# Patient Record
Sex: FEMALE | Race: AMERICAN INDIAN OR ALASKA NATIVE | URBAN - METROPOLITAN AREA
[De-identification: names, ages, dates, MRNs, and addresses within clinical notes are randomized per-mention and may not be internally consistent; named-entity substitution may affect disease eponyms.]

---

## 2018-09-12 ENCOUNTER — APPOINTMENT (RX ONLY)
Dept: URBAN - METROPOLITAN AREA CLINIC 128 | Facility: CLINIC | Age: 58
Setting detail: DERMATOLOGY
End: 2018-09-12

## 2018-09-12 DIAGNOSIS — L40.0 PSORIASIS VULGARIS: ICD-10-CM

## 2018-09-12 DIAGNOSIS — L72.8 OTHER FOLLICULAR CYSTS OF THE SKIN AND SUBCUTANEOUS TISSUE: ICD-10-CM

## 2018-09-12 PROBLEM — M12.9 ARTHROPATHY, UNSPECIFIED: Status: ACTIVE | Noted: 2018-09-12

## 2018-09-12 PROBLEM — L70.0 ACNE VULGARIS: Status: ACTIVE | Noted: 2018-09-12

## 2018-09-12 PROBLEM — L85.3 XEROSIS CUTIS: Status: ACTIVE | Noted: 2018-09-12

## 2018-09-12 PROCEDURE — ? PRESCRIPTION

## 2018-09-12 PROCEDURE — ? ADDITIONAL NOTES

## 2018-09-12 PROCEDURE — ? TREATMENT REGIMEN

## 2018-09-12 PROCEDURE — 99202 OFFICE O/P NEW SF 15 MIN: CPT

## 2018-09-12 PROCEDURE — ? COUNSELING

## 2018-09-12 RX ORDER — DOXYCYCLINE HYCLATE 100 MG/1
CAPSULE, GELATIN COATED ORAL
Qty: 20 | Refills: 0 | Status: ERX | COMMUNITY
Start: 2018-09-12

## 2018-09-12 RX ADMIN — DOXYCYCLINE HYCLATE: 100 CAPSULE, GELATIN COATED ORAL at 00:00

## 2018-09-12 ASSESSMENT — LOCATION ZONE DERM
LOCATION ZONE: LEG
LOCATION ZONE: TRUNK

## 2018-09-12 ASSESSMENT — LOCATION DETAILED DESCRIPTION DERM
LOCATION DETAILED: RIGHT INGUINAL FOLD
LOCATION DETAILED: GENITALIA

## 2018-09-12 ASSESSMENT — LOCATION SIMPLE DESCRIPTION DERM
LOCATION SIMPLE: RIGHT INGUINAL FOLD
LOCATION SIMPLE: GENITALIA

## 2018-09-12 NOTE — PROCEDURE: ADDITIONAL NOTES
Additional Notes: OTC treatmentfor elbows: CeraVe renewing SA Cream and/or Cetaphil Restoraderm
Detail Level: Simple

## 2019-02-18 ENCOUNTER — APPOINTMENT (RX ONLY)
Dept: URBAN - METROPOLITAN AREA CLINIC 128 | Facility: CLINIC | Age: 59
Setting detail: DERMATOLOGY
End: 2019-02-18

## 2019-02-18 DIAGNOSIS — L57.8 OTHER SKIN CHANGES DUE TO CHRONIC EXPOSURE TO NONIONIZING RADIATION: ICD-10-CM

## 2019-02-18 DIAGNOSIS — L30.4 ERYTHEMA INTERTRIGO: ICD-10-CM

## 2019-02-18 DIAGNOSIS — L91.8 OTHER HYPERTROPHIC DISORDERS OF THE SKIN: ICD-10-CM

## 2019-02-18 DIAGNOSIS — L82.1 OTHER SEBORRHEIC KERATOSIS: ICD-10-CM

## 2019-02-18 DIAGNOSIS — L70.0 ACNE VULGARIS: ICD-10-CM

## 2019-02-18 PROCEDURE — ? BENIGN DESTRUCTION COSMETIC

## 2019-02-18 PROCEDURE — 99213 OFFICE O/P EST LOW 20 MIN: CPT

## 2019-02-18 PROCEDURE — ? COUNSELING

## 2019-02-18 PROCEDURE — ? PRESCRIPTION

## 2019-02-18 RX ORDER — TRIAMCINOLONE ACETONIDE 1 MG/G
CREAM TOPICAL
Qty: 1 | Refills: 1 | Status: ERX | COMMUNITY
Start: 2019-02-18

## 2019-02-18 RX ORDER — SARECYCLINE HYDROCHLORIDE 100 MG/1
TABLET, COATED ORAL
Qty: 30 | Refills: 1 | Status: ERX | COMMUNITY
Start: 2019-02-18

## 2019-02-18 RX ADMIN — SARECYCLINE HYDROCHLORIDE: 100 TABLET, COATED ORAL at 00:00

## 2019-02-18 RX ADMIN — TRIAMCINOLONE ACETONIDE: 1 CREAM TOPICAL at 00:00

## 2019-02-18 ASSESSMENT — LOCATION ZONE DERM
LOCATION ZONE: ARM
LOCATION ZONE: FACE
LOCATION ZONE: NECK
LOCATION ZONE: TRUNK

## 2019-02-18 ASSESSMENT — LOCATION SIMPLE DESCRIPTION DERM
LOCATION SIMPLE: RIGHT LOWER BACK
LOCATION SIMPLE: LEFT CHEEK
LOCATION SIMPLE: LEFT ANTERIOR NECK
LOCATION SIMPLE: ABDOMEN
LOCATION SIMPLE: RIGHT CHEEK
LOCATION SIMPLE: LEFT FOREARM
LOCATION SIMPLE: LEFT BREAST

## 2019-02-18 ASSESSMENT — LOCATION DETAILED DESCRIPTION DERM
LOCATION DETAILED: RIGHT RIB CAGE
LOCATION DETAILED: RIGHT SUPERIOR LATERAL LOWER BACK
LOCATION DETAILED: RIGHT INFERIOR MEDIAL MALAR CHEEK
LOCATION DETAILED: SUBXIPHOID
LOCATION DETAILED: LEFT INFERIOR CENTRAL MALAR CHEEK
LOCATION DETAILED: LEFT PROXIMAL DORSAL FOREARM
LOCATION DETAILED: LEFT INFERIOR ANTERIOR NECK
LOCATION DETAILED: LEFT INFRAMAMMARY CREASE (INNER QUADRANT)

## 2019-02-18 NOTE — PROCEDURE: BENIGN DESTRUCTION COSMETIC
Anesthesia Volume In Cc: 0.5
Consent: The patient's consent was obtained including but not limited to risks of crusting, scabbing, blistering, scarring, darker or lighter pigmentary change, recurrence, incomplete removal and infection.
Post-Care Instructions: I reviewed with the patient in detail post-care instructions. Patient is to wear sunprotection, and avoid picking at any of the treated lesions. Pt may apply Vaseline to crusted or scabbing areas.
Anesthesia Type: 1% Xylocaine without epinephrine
Detail Level: Detailed
Price (Use Numbers Only, No Special Characters Or $): 50

## 2019-02-18 NOTE — PROCEDURE: COUNSELING
Topical Clindamycin Counseling: Patient counseled that this medication may cause skin irritation or allergic reactions.  In the event of skin irritation, the patient was advised to reduce the amount of the drug applied or use it less frequently.   The patient verbalized understanding of the proper use and possible adverse effects of clindamycin.  All of the patient's questions and concerns were addressed.
Isotretinoin Counseling: Patient should get monthly blood tests, not donate blood, not drive at night if vision affected, not share medication, and not undergo elective surgery for 6 months after tx completed. Side effects reviewed, pt to contact office should one occur.
Minocycline Counseling: Patient advised regarding possible photosensitivity and discoloration of the teeth, skin, lips, tongue and gums.  Patient instructed to avoid sunlight, if possible.  When exposed to sunlight, patients should wear protective clothing, sunglasses, and sunscreen.  The patient was instructed to call the office immediately if the following severe adverse effects occur:  hearing changes, easy bruising/bleeding, severe headache, or vision changes.  The patient verbalized understanding of the proper use and possible adverse effects of minocycline.  All of the patient's questions and concerns were addressed.
Topical Retinoid counseling:  Patient advised to apply a pea-sized amount only at bedtime and wait 30 minutes after washing their face before applying.  If too drying, patient may add a non-comedogenic moisturizer. The patient verbalized understanding of the proper use and possible adverse effects of retinoids.  All of the patient's questions and concerns were addressed.
Doxycycline Counseling:  Patient counseled regarding possible photosensitivity and increased risk for sunburn.  Patient instructed to avoid sunlight, if possible.  When exposed to sunlight, patients should wear protective clothing, sunglasses, and sunscreen.  The patient was instructed to call the office immediately if the following severe adverse effects occur:  hearing changes, easy bruising/bleeding, severe headache, or vision changes.  The patient verbalized understanding of the proper use and possible adverse effects of doxycycline.  All of the patient's questions and concerns were addressed.
Azithromycin Counseling:  I discussed with the patient the risks of azithromycin including but not limited to GI upset, allergic reaction, drug rash, diarrhea, and yeast infections.
Birth Control Pills Counseling: Birth Control Pill Counseling: I discussed with the patient the potential side effects of OCPs including but not limited to increased risk of stroke, heart attack, thrombophlebitis, deep venous thrombosis, hepatic adenomas, breast changes, GI upset, headaches, and depression.  The patient verbalized understanding of the proper use and possible adverse effects of OCPs. All of the patient's questions and concerns were addressed.
Tetracycline Counseling: Patient counseled regarding possible photosensitivity and increased risk for sunburn.  Patient instructed to avoid sunlight, if possible.  When exposed to sunlight, patients should wear protective clothing, sunglasses, and sunscreen.  The patient was instructed to call the office immediately if the following severe adverse effects occur:  hearing changes, easy bruising/bleeding, severe headache, or vision changes.  The patient verbalized understanding of the proper use and possible adverse effects of tetracycline.  All of the patient's questions and concerns were addressed. Patient understands to avoid pregnancy while on therapy due to potential birth defects.
Birth Control Pills Pregnancy And Lactation Text: This medication should be avoided if pregnant and for the first 30 days post-partum.
Tetracycline Pregnancy And Lactation Text: This medication is Pregnancy Category D and not consider safe during pregnancy. It is also excreted in breast milk.
Azithromycin Pregnancy And Lactation Text: This medication is considered safe during pregnancy and is also secreted in breast milk.
Use Enhanced Medication Counseling?: No
Doxycycline Pregnancy And Lactation Text: This medication is Pregnancy Category D and not consider safe during pregnancy. It is also excreted in breast milk but is considered safe for shorter treatment courses.
Topical Retinoid Pregnancy And Lactation Text: This medication is Pregnancy Category C. It is unknown if this medication is excreted in breast milk.
Isotretinoin Pregnancy And Lactation Text: This medication is Pregnancy Category X and is considered extremely dangerous during pregnancy. It is unknown if it is excreted in breast milk.
Topical Clindamycin Pregnancy And Lactation Text: This medication is Pregnancy Category B and is considered safe during pregnancy. It is unknown if it is excreted in breast milk.
Topical Sulfur Applications Counseling: Topical Sulfur Counseling: Patient counseled that this medication may cause skin irritation or allergic reactions.  In the event of skin irritation, the patient was advised to reduce the amount of the drug applied or use it less frequently.   The patient verbalized understanding of the proper use and possible adverse effects of topical sulfur application.  All of the patient's questions and concerns were addressed.
High Dose Vitamin A Counseling: Side effects reviewed, pt to contact office should one occur.
Erythromycin Counseling:  I discussed with the patient the risks of erythromycin including but not limited to GI upset, allergic reaction, drug rash, diarrhea, increase in liver enzymes, and yeast infections.
Dapsone Counseling: I discussed with the patient the risks of dapsone including but not limited to hemolytic anemia, agranulocytosis, rashes, methemoglobinemia, kidney failure, peripheral neuropathy, headaches, GI upset, and liver toxicity.  Patients who start dapsone require monitoring including baseline LFTs and weekly CBCs for the first month, then every month thereafter.  The patient verbalized understanding of the proper use and possible adverse effects of dapsone.  All of the patient's questions and concerns were addressed.
Benzoyl Peroxide Counseling: Patient counseled that medicine may cause skin irritation and bleach clothing.  In the event of skin irritation, the patient was advised to reduce the amount of the drug applied or use it less frequently.   The patient verbalized understanding of the proper use and possible adverse effects of benzoyl peroxide.  All of the patient's questions and concerns were addressed.
Tazorac Counseling:  Patient advised that medication is irritating and drying.  Patient may need to apply sparingly and wash off after an hour before eventually leaving it on overnight.  The patient verbalized understanding of the proper use and possible adverse effects of tazorac.  All of the patient's questions and concerns were addressed.
Bactrim Counseling:  I discussed with the patient the risks of sulfa antibiotics including but not limited to GI upset, allergic reaction, drug rash, diarrhea, dizziness, photosensitivity, and yeast infections.  Rarely, more serious reactions can occur including but not limited to aplastic anemia, agranulocytosis, methemoglobinemia, blood dyscrasias, liver or kidney failure, lung infiltrates or desquamative/blistering drug rashes.
Spironolactone Counseling: Patient advised regarding risks of diarrhea, abdominal pain, hyperkalemia, birth defects (for female patients), liver toxicity and renal toxicity. The patient may need blood work to monitor liver and kidney function and potassium levels while on therapy. The patient verbalized understanding of the proper use and possible adverse effects of spironolactone.  All of the patient's questions and concerns were addressed.
Detail Level: Zone
Spironolactone Pregnancy And Lactation Text: This medication can cause feminization of the male fetus and should be avoided during pregnancy. The active metabolite is also found in breast milk.
Bactrim Pregnancy And Lactation Text: This medication is Pregnancy Category D and is known to cause fetal risk.  It is also excreted in breast milk.
Dapsone Pregnancy And Lactation Text: This medication is Pregnancy Category C and is not considered safe during pregnancy or breast feeding.
Benzoyl Peroxide Pregnancy And Lactation Text: This medication is Pregnancy Category C. It is unknown if benzoyl peroxide is excreted in breast milk.
High Dose Vitamin A Pregnancy And Lactation Text: High dose vitamin A therapy is contraindicated during pregnancy and breast feeding.
Topical Sulfur Applications Pregnancy And Lactation Text: This medication is Pregnancy Category C and has an unknown safety profile during pregnancy. It is unknown if this topical medication is excreted in breast milk.
Tazorac Pregnancy And Lactation Text: This medication is not safe during pregnancy. It is unknown if this medication is excreted in breast milk.
Erythromycin Pregnancy And Lactation Text: This medication is Pregnancy Category B and is considered safe during pregnancy. It is also excreted in breast milk.
Detail Level: Detailed

## 2019-02-21 ENCOUNTER — RX ONLY (OUTPATIENT)
Age: 59
Setting detail: RX ONLY
End: 2019-02-21

## 2019-02-21 RX ORDER — SARECYCLINE HYDROCHLORIDE 100 MG/1
TABLET, COATED ORAL
Qty: 30 | Refills: 1 | Status: ERX

## 2019-03-12 ENCOUNTER — RX ONLY (OUTPATIENT)
Age: 59
Setting detail: RX ONLY
End: 2019-03-12

## 2019-03-12 RX ORDER — DOXYCYCLINE HYCLATE 100 MG/1
CAPSULE, GELATIN COATED ORAL
Qty: 90 | Refills: 1 | Status: ERX

## 2019-03-12 RX ORDER — DOXYCYCLINE HYCLATE 100 MG/1
CAPSULE, GELATIN COATED ORAL
Qty: 30 | Refills: 2 | Status: ERX

## 2019-07-17 ENCOUNTER — APPOINTMENT (RX ONLY)
Dept: URBAN - METROPOLITAN AREA CLINIC 129 | Facility: CLINIC | Age: 59
Setting detail: DERMATOLOGY
End: 2019-07-17

## 2019-07-17 DIAGNOSIS — L57.8 OTHER SKIN CHANGES DUE TO CHRONIC EXPOSURE TO NONIONIZING RADIATION: ICD-10-CM

## 2019-07-17 DIAGNOSIS — L70.0 ACNE VULGARIS: ICD-10-CM

## 2019-07-17 DIAGNOSIS — L40.0 PSORIASIS VULGARIS: ICD-10-CM

## 2019-07-17 PROCEDURE — ? COUNSELING

## 2019-07-17 PROCEDURE — ? INVENTORY

## 2019-07-17 PROCEDURE — 99213 OFFICE O/P EST LOW 20 MIN: CPT

## 2019-07-17 PROCEDURE — ? PRESCRIPTION

## 2019-07-17 PROCEDURE — ? TREATMENT REGIMEN

## 2019-07-17 PROCEDURE — ? ADDITIONAL NOTES

## 2019-07-17 RX ORDER — SODIUM SULFACETAMIDE, SULFUR 40; 90 MG/ML; MG/ML
LIQUID TOPICAL
Qty: 1 | Refills: 2 | Status: ERX | COMMUNITY
Start: 2019-07-17

## 2019-07-17 RX ADMIN — SODIUM SULFACETAMIDE, SULFUR: 40; 90 LIQUID TOPICAL at 14:32

## 2019-07-17 ASSESSMENT — LOCATION ZONE DERM
LOCATION ZONE: ARM
LOCATION ZONE: FACE
LOCATION ZONE: NECK
LOCATION ZONE: FACE

## 2019-07-17 ASSESSMENT — LOCATION SIMPLE DESCRIPTION DERM
LOCATION SIMPLE: LEFT CHEEK
LOCATION SIMPLE: LEFT ANTERIOR NECK
LOCATION SIMPLE: LEFT CHEEK
LOCATION SIMPLE: RIGHT CHEEK
LOCATION SIMPLE: LEFT FOREARM
LOCATION SIMPLE: RIGHT CHEEK

## 2019-07-17 ASSESSMENT — LOCATION DETAILED DESCRIPTION DERM
LOCATION DETAILED: LEFT INFERIOR CENTRAL MALAR CHEEK
LOCATION DETAILED: LEFT INFERIOR MEDIAL MALAR CHEEK
LOCATION DETAILED: LEFT INFERIOR ANTERIOR NECK
LOCATION DETAILED: RIGHT INFERIOR MEDIAL MALAR CHEEK
LOCATION DETAILED: LEFT PROXIMAL DORSAL FOREARM
LOCATION DETAILED: RIGHT INFERIOR CENTRAL MALAR CHEEK

## 2019-07-17 NOTE — PROCEDURE: COUNSELING
Minocycline Counseling: Patient advised regarding possible photosensitivity and discoloration of the teeth, skin, lips, tongue and gums.  Patient instructed to avoid sunlight, if possible.  When exposed to sunlight, patients should wear protective clothing, sunglasses, and sunscreen.  The patient was instructed to call the office immediately if the following severe adverse effects occur:  hearing changes, easy bruising/bleeding, severe headache, or vision changes.  The patient verbalized understanding of the proper use and possible adverse effects of minocycline.  All of the patient's questions and concerns were addressed.
Include Pregnancy/Lactation Warning?: No
Topical Clindamycin Counseling: Patient counseled that this medication may cause skin irritation or allergic reactions.  In the event of skin irritation, the patient was advised to reduce the amount of the drug applied or use it less frequently.   The patient verbalized understanding of the proper use and possible adverse effects of clindamycin.  All of the patient's questions and concerns were addressed.
Topical Clindamycin Pregnancy And Lactation Text: This medication is Pregnancy Category B and is considered safe during pregnancy. It is unknown if it is excreted in breast milk.
Bactrim Pregnancy And Lactation Text: This medication is Pregnancy Category D and is known to cause fetal risk.  It is also excreted in breast milk.
Doxycycline Pregnancy And Lactation Text: This medication is Pregnancy Category D and not consider safe during pregnancy. It is also excreted in breast milk but is considered safe for shorter treatment courses.
Tazorac Pregnancy And Lactation Text: This medication is not safe during pregnancy. It is unknown if this medication is excreted in breast milk.
Spironolactone Counseling: Patient advised regarding risks of diarrhea, abdominal pain, hyperkalemia, birth defects (for female patients), liver toxicity and renal toxicity. The patient may need blood work to monitor liver and kidney function and potassium levels while on therapy. The patient verbalized understanding of the proper use and possible adverse effects of spironolactone.  All of the patient's questions and concerns were addressed.
Birth Control Pills Pregnancy And Lactation Text: This medication should be avoided if pregnant and for the first 30 days post-partum.
High Dose Vitamin A Counseling: Side effects reviewed, pt to contact office should one occur.
Azithromycin Counseling:  I discussed with the patient the risks of azithromycin including but not limited to GI upset, allergic reaction, drug rash, diarrhea, and yeast infections.
Dapsone Pregnancy And Lactation Text: This medication is Pregnancy Category C and is not considered safe during pregnancy or breast feeding.
Birth Control Pills Counseling: Birth Control Pill Counseling: I discussed with the patient the potential side effects of OCPs including but not limited to increased risk of stroke, heart attack, thrombophlebitis, deep venous thrombosis, hepatic adenomas, breast changes, GI upset, headaches, and depression.  The patient verbalized understanding of the proper use and possible adverse effects of OCPs. All of the patient's questions and concerns were addressed.
Tetracycline Pregnancy And Lactation Text: This medication is Pregnancy Category D and not consider safe during pregnancy. It is also excreted in breast milk.
Erythromycin Counseling:  I discussed with the patient the risks of erythromycin including but not limited to GI upset, allergic reaction, drug rash, diarrhea, increase in liver enzymes, and yeast infections.
Topical Retinoid counseling:  Patient advised to apply a pea-sized amount only at bedtime and wait 30 minutes after washing their face before applying.  If too drying, patient may add a non-comedogenic moisturizer. The patient verbalized understanding of the proper use and possible adverse effects of retinoids.  All of the patient's questions and concerns were addressed.
High Dose Vitamin A Pregnancy And Lactation Text: High dose vitamin A therapy is contraindicated during pregnancy and breast feeding.
Tazorac Counseling:  Patient advised that medication is irritating and drying.  Patient may need to apply sparingly and wash off after an hour before eventually leaving it on overnight.  The patient verbalized understanding of the proper use and possible adverse effects of tazorac.  All of the patient's questions and concerns were addressed.
Detail Level: Zone
Erythromycin Pregnancy And Lactation Text: This medication is Pregnancy Category B and is considered safe during pregnancy. It is also excreted in breast milk.
Topical Sulfur Applications Pregnancy And Lactation Text: This medication is Pregnancy Category C and has an unknown safety profile during pregnancy. It is unknown if this topical medication is excreted in breast milk.
Benzoyl Peroxide Pregnancy And Lactation Text: This medication is Pregnancy Category C. It is unknown if benzoyl peroxide is excreted in breast milk.
Spironolactone Pregnancy And Lactation Text: This medication can cause feminization of the male fetus and should be avoided during pregnancy. The active metabolite is also found in breast milk.
Azithromycin Pregnancy And Lactation Text: This medication is considered safe during pregnancy and is also secreted in breast milk.
Dapsone Counseling: I discussed with the patient the risks of dapsone including but not limited to hemolytic anemia, agranulocytosis, rashes, methemoglobinemia, kidney failure, peripheral neuropathy, headaches, GI upset, and liver toxicity.  Patients who start dapsone require monitoring including baseline LFTs and weekly CBCs for the first month, then every month thereafter.  The patient verbalized understanding of the proper use and possible adverse effects of dapsone.  All of the patient's questions and concerns were addressed.
Bactrim Counseling:  I discussed with the patient the risks of sulfa antibiotics including but not limited to GI upset, allergic reaction, drug rash, diarrhea, dizziness, photosensitivity, and yeast infections.  Rarely, more serious reactions can occur including but not limited to aplastic anemia, agranulocytosis, methemoglobinemia, blood dyscrasias, liver or kidney failure, lung infiltrates or desquamative/blistering drug rashes.
Topical Retinoid Pregnancy And Lactation Text: This medication is Pregnancy Category C. It is unknown if this medication is excreted in breast milk.
Benzoyl Peroxide Counseling: Patient counseled that medicine may cause skin irritation and bleach clothing.  In the event of skin irritation, the patient was advised to reduce the amount of the drug applied or use it less frequently.   The patient verbalized understanding of the proper use and possible adverse effects of benzoyl peroxide.  All of the patient's questions and concerns were addressed.
Doxycycline Counseling:  Patient counseled regarding possible photosensitivity and increased risk for sunburn.  Patient instructed to avoid sunlight, if possible.  When exposed to sunlight, patients should wear protective clothing, sunglasses, and sunscreen.  The patient was instructed to call the office immediately if the following severe adverse effects occur:  hearing changes, easy bruising/bleeding, severe headache, or vision changes.  The patient verbalized understanding of the proper use and possible adverse effects of doxycycline.  All of the patient's questions and concerns were addressed.
Tetracycline Counseling: Patient counseled regarding possible photosensitivity and increased risk for sunburn.  Patient instructed to avoid sunlight, if possible.  When exposed to sunlight, patients should wear protective clothing, sunglasses, and sunscreen.  The patient was instructed to call the office immediately if the following severe adverse effects occur:  hearing changes, easy bruising/bleeding, severe headache, or vision changes.  The patient verbalized understanding of the proper use and possible adverse effects of tetracycline.  All of the patient's questions and concerns were addressed. Patient understands to avoid pregnancy while on therapy due to potential birth defects.
Topical Sulfur Applications Counseling: Topical Sulfur Counseling: Patient counseled that this medication may cause skin irritation or allergic reactions.  In the event of skin irritation, the patient was advised to reduce the amount of the drug applied or use it less frequently.   The patient verbalized understanding of the proper use and possible adverse effects of topical sulfur application.  All of the patient's questions and concerns were addressed.
Isotretinoin Pregnancy And Lactation Text: This medication is Pregnancy Category X and is considered extremely dangerous during pregnancy. It is unknown if it is excreted in breast milk.
Isotretinoin Counseling: Patient should get monthly blood tests, not donate blood, not drive at night if vision affected, not share medication, and not undergo elective surgery for 6 months after tx completed. Side effects reviewed, pt to contact office should one occur.
Detail Level: Detailed

## 2019-07-17 NOTE — PROCEDURE: TREATMENT REGIMEN
Detail Level: Zone
Initiate Treatment: Doxycycline 100 mg BID for 2 weeks, then qd, duobri apply to aa qhs
Action 2: Continue
Treatment 1: Otezla 30mg
Sig For Treatment 1 (If Needed): twice daily

## 2019-07-17 NOTE — PROCEDURE: ADDITIONAL NOTES
Detail Level: Simple
Additional Notes: OTC treatmentfor elbows: CeraVe renewing SA Cream and/or Cetaphil Restoraderm

## 2019-08-09 ENCOUNTER — RX ONLY (OUTPATIENT)
Age: 59
Setting detail: RX ONLY
End: 2019-08-09

## 2019-08-09 RX ORDER — SULFAMETHOXAZOLE AND TRIMETHOPRIM 800; 160 MG/1; MG/1
TABLET ORAL
Qty: 60 | Refills: 0 | Status: ERX | COMMUNITY
Start: 2019-08-09

## 2019-10-17 ENCOUNTER — APPOINTMENT (RX ONLY)
Dept: URBAN - METROPOLITAN AREA CLINIC 129 | Facility: CLINIC | Age: 59
Setting detail: DERMATOLOGY
End: 2019-10-17

## 2019-10-17 DIAGNOSIS — L20.89 OTHER ATOPIC DERMATITIS: ICD-10-CM

## 2019-10-17 DIAGNOSIS — L73.8 OTHER SPECIFIED FOLLICULAR DISORDERS: ICD-10-CM

## 2019-10-17 DIAGNOSIS — L40.0 PSORIASIS VULGARIS: ICD-10-CM

## 2019-10-17 PROCEDURE — ? ADDITIONAL NOTES

## 2019-10-17 PROCEDURE — 99213 OFFICE O/P EST LOW 20 MIN: CPT

## 2019-10-17 PROCEDURE — ? COUNSELING

## 2019-10-17 PROCEDURE — ? PRESCRIPTION

## 2019-10-17 PROCEDURE — ? TREATMENT REGIMEN

## 2019-10-17 RX ORDER — BETAMETHASONE DIPROPIONATE 0.5 MG/G
CREAM TOPICAL
Qty: 1 | Refills: 1 | Status: ERX | COMMUNITY
Start: 2019-10-17

## 2019-10-17 RX ADMIN — BETAMETHASONE DIPROPIONATE: 0.5 CREAM TOPICAL at 13:09

## 2019-10-17 ASSESSMENT — LOCATION DETAILED DESCRIPTION DERM
LOCATION DETAILED: RIGHT MEDIAL MALAR CHEEK
LOCATION DETAILED: LEFT INFERIOR LATERAL NECK
LOCATION DETAILED: RIGHT INFERIOR ANTERIOR NECK

## 2019-10-17 ASSESSMENT — LOCATION ZONE DERM
LOCATION ZONE: NECK
LOCATION ZONE: FACE

## 2019-10-17 ASSESSMENT — LOCATION SIMPLE DESCRIPTION DERM
LOCATION SIMPLE: RIGHT ANTERIOR NECK
LOCATION SIMPLE: LEFT ANTERIOR NECK
LOCATION SIMPLE: RIGHT CHEEK

## 2019-10-17 NOTE — PROCEDURE: TREATMENT REGIMEN
Action 4: Continue
Sig For Treatment 1 (If Needed): twice daily
Treatment 1: Otezla 30mg
Detail Level: Zone

## 2020-08-19 NOTE — PROGRESS NOTES
"Subjective:       Patient ID: Charla Patrick is a 60 y.o. female.    VIRTUAL TELENOTE    Start time: 3:30pm  Chief complaint: nasal congestion, postnasal drip  The patient location is: her home  The patient arrived at: 3:30pm  Present with the patient at the time of the telemed/virtual assessment:nobody  End time: 4:00pm  Total time spent with patient: 30 minutes    The attending portion of this evaluation, treatment, and documentation was performed per Jeanna Rascon PA-C via audio only.      Chief Complaint: Nasal Congestion    HPI     Patient is new to me and this clinic. She just moved to the area from [place]. Patient had technical difficulties with the video visit, so we opted for an audio only visit. It was admittedly difficult to hear the patient clearly, but I believe I had enough information to interpret her symptoms and initiate safe treatment based on her medical history. She notes sensation of "fluid in left ear" along with postnasal drip for the last 2 days. No rhinorrhea, coughing, SOB, CP, N/V/D.     Patient is a 60 year old female with a history of psoriatic arthritis, T2DM, and GERD.     Review of Systems   Constitutional: Negative for activity change, appetite change, chills, fatigue and fever.   HENT: Positive for nasal congestion and postnasal drip. Negative for rhinorrhea, sinus pressure/congestion and sore throat.    Respiratory: Negative for cough, choking, chest tightness, shortness of breath and wheezing.    Cardiovascular: Negative for chest pain and palpitations.   Gastrointestinal: Negative for abdominal pain, change in bowel habit, constipation, diarrhea, nausea, vomiting, reflux and change in bowel habit.   Genitourinary: Negative for difficulty urinating, dysuria, flank pain, frequency, hematuria and urgency.   Musculoskeletal: Negative for myalgias.   Integumentary:  Negative for rash.   Neurological: Negative for dizziness, vertigo, tremors, weakness, light-headedness and " headaches.   Psychiatric/Behavioral: Negative for sleep disturbance. The patient is not nervous/anxious.          Objective:      Physical Exam  Psychiatric:         Mood and Affect: Mood normal.         Behavior: Behavior normal.         Thought Content: Thought content normal.         Judgment: Judgment normal.         Physical exam was not able to be performed today, as this was an audio only virtual visit due to technical difficulties.       Assessment:       1. Allergic rhinitis, unspecified seasonality, unspecified trigger    2. Postnasal drip    3. Preventative health care    4. Type 2 diabetes mellitus without complication, without long-term current use of insulin    5. Psoriatic arthritis    6. Gastroesophageal reflux disease without esophagitis          Plan:       1. Allergic rhinitis, unspecified seasonality, unspecified trigger    2. Postnasal drip  - loratadine (CLARITIN) 10 mg tablet; Take 1 tablet (10 mg total) by mouth once daily.  Dispense: 15 tablet; Refill: 0  - fluticasone propionate (FLONASE) 50 mcg/actuation nasal spray; 2 sprays (100 mcg total) by Each Nostril route once daily. for 10 days  Dispense: 11.1 mL; Refill: 0    3. Preventative health care  - Mammo Digital Screening Bilat w/ [facility]obardo; Future    4. Type 2 diabetes mellitus without complication, without long-term current use of insulin  -on statin  -on glimepiride  -will get updated labs    5. Psoriatic arthritis  -on otezla, controlled    6. Gastroesophageal reflux disease without esophagitis  -controlled on pepcid and protonix         CARE GAPS:  Colonoscopy due in 1 year per patient.   All other labs and vaccines at future visit.     I do not believe that patient's current symptoms point to covid. Patient is new to the area and would like to establish care. She will come tomorrow for an in-person evaluation and to establish care with me and a physician/get labs ordered.

## 2020-08-20 ENCOUNTER — OFFICE VISIT (OUTPATIENT)
Dept: FAMILY MEDICINE | Facility: CLINIC | Age: 60
End: 2020-08-20
Payer: OTHER GOVERNMENT

## 2020-08-20 DIAGNOSIS — Z00.00 PREVENTATIVE HEALTH CARE: ICD-10-CM

## 2020-08-20 DIAGNOSIS — E11.9 TYPE 2 DIABETES MELLITUS WITHOUT COMPLICATION, WITHOUT LONG-TERM CURRENT USE OF INSULIN: ICD-10-CM

## 2020-08-20 DIAGNOSIS — L40.50 PSORIATIC ARTHRITIS: ICD-10-CM

## 2020-08-20 DIAGNOSIS — J30.9 ALLERGIC RHINITIS, UNSPECIFIED SEASONALITY, UNSPECIFIED TRIGGER: Primary | ICD-10-CM

## 2020-08-20 DIAGNOSIS — K21.9 GASTROESOPHAGEAL REFLUX DISEASE WITHOUT ESOPHAGITIS: ICD-10-CM

## 2020-08-20 DIAGNOSIS — R09.82 POSTNASAL DRIP: ICD-10-CM

## 2020-08-20 PROCEDURE — 99499 NO LOS: ICD-10-PCS | Mod: 95,,, | Performed by: PHYSICIAN ASSISTANT

## 2020-08-20 PROCEDURE — 99499 UNLISTED E&M SERVICE: CPT | Mod: 95,,, | Performed by: PHYSICIAN ASSISTANT

## 2020-08-20 RX ORDER — FLUTICASONE PROPIONATE 50 MCG
2 SPRAY, SUSPENSION (ML) NASAL DAILY
Qty: 11.1 ML | Refills: 0 | Status: SHIPPED | OUTPATIENT
Start: 2020-08-20 | End: 2020-08-21 | Stop reason: CLARIF

## 2020-08-20 RX ORDER — GLIMEPIRIDE 1 MG/1
1 TABLET ORAL
COMMUNITY
End: 2021-03-08 | Stop reason: SDUPTHER

## 2020-08-20 RX ORDER — PANTOPRAZOLE SODIUM 40 MG/1
40 TABLET, DELAYED RELEASE ORAL DAILY
COMMUNITY
End: 2021-02-09 | Stop reason: SDUPTHER

## 2020-08-20 RX ORDER — LORATADINE 10 MG/1
10 TABLET ORAL DAILY
Qty: 15 TABLET | Refills: 0 | Status: SHIPPED | OUTPATIENT
Start: 2020-08-20 | End: 2020-08-21 | Stop reason: CLARIF

## 2020-08-20 RX ORDER — TIZANIDINE 4 MG/1
4 TABLET ORAL EVERY 6 HOURS PRN
COMMUNITY
End: 2021-01-13

## 2020-08-20 RX ORDER — FAMOTIDINE 40 MG/1
40 TABLET, FILM COATED ORAL DAILY
COMMUNITY
End: 2023-04-19 | Stop reason: SDUPTHER

## 2020-08-20 RX ORDER — SIMVASTATIN 40 MG/1
40 TABLET, FILM COATED ORAL NIGHTLY
COMMUNITY
End: 2020-08-21 | Stop reason: SDUPTHER

## 2020-08-21 ENCOUNTER — OFFICE VISIT (OUTPATIENT)
Dept: FAMILY MEDICINE | Facility: CLINIC | Age: 60
End: 2020-08-21
Payer: OTHER GOVERNMENT

## 2020-08-21 ENCOUNTER — TELEPHONE (OUTPATIENT)
Dept: FAMILY MEDICINE | Facility: CLINIC | Age: 60
End: 2020-08-21

## 2020-08-21 VITALS
SYSTOLIC BLOOD PRESSURE: 132 MMHG | DIASTOLIC BLOOD PRESSURE: 80 MMHG | OXYGEN SATURATION: 97 % | HEART RATE: 66 BPM | WEIGHT: 169.56 LBS

## 2020-08-21 DIAGNOSIS — Z12.2 ENCOUNTER FOR SCREENING FOR MALIGNANT NEOPLASM OF RESPIRATORY ORGANS: ICD-10-CM

## 2020-08-21 DIAGNOSIS — R09.82 POSTNASAL DRIP: Primary | ICD-10-CM

## 2020-08-21 DIAGNOSIS — E78.5 HYPERLIPIDEMIA, UNSPECIFIED HYPERLIPIDEMIA TYPE: ICD-10-CM

## 2020-08-21 DIAGNOSIS — R09.82 ALLERGIC RHINITIS WITH POSTNASAL DRIP: Primary | ICD-10-CM

## 2020-08-21 DIAGNOSIS — J30.9 ALLERGIC RHINITIS WITH POSTNASAL DRIP: Primary | ICD-10-CM

## 2020-08-21 DIAGNOSIS — E11.9 TYPE 2 DIABETES MELLITUS WITHOUT COMPLICATION, WITHOUT LONG-TERM CURRENT USE OF INSULIN: ICD-10-CM

## 2020-08-21 DIAGNOSIS — Z00.00 PREVENTATIVE HEALTH CARE: ICD-10-CM

## 2020-08-21 DIAGNOSIS — F41.1 ANXIETY RELATED TO CHANGE IN ROLE: ICD-10-CM

## 2020-08-21 PROCEDURE — 99214 PR OFFICE/OUTPT VISIT, EST, LEVL IV, 30-39 MIN: ICD-10-PCS | Mod: S$PBB,,, | Performed by: PHYSICIAN ASSISTANT

## 2020-08-21 PROCEDURE — 99213 OFFICE O/P EST LOW 20 MIN: CPT | Mod: PBBFAC,PO | Performed by: PHYSICIAN ASSISTANT

## 2020-08-21 PROCEDURE — 99214 OFFICE O/P EST MOD 30 MIN: CPT | Mod: S$PBB,,, | Performed by: PHYSICIAN ASSISTANT

## 2020-08-21 PROCEDURE — 99999 PR PBB SHADOW E&M-EST. PATIENT-LVL III: ICD-10-PCS | Mod: PBBFAC,,, | Performed by: PHYSICIAN ASSISTANT

## 2020-08-21 PROCEDURE — 99999 PR PBB SHADOW E&M-EST. PATIENT-LVL III: CPT | Mod: PBBFAC,,, | Performed by: PHYSICIAN ASSISTANT

## 2020-08-21 RX ORDER — FLUTICASONE PROPIONATE 50 MCG
2 SPRAY, SUSPENSION (ML) NASAL DAILY
Qty: 11.1 ML | Refills: 0 | Status: SHIPPED | OUTPATIENT
Start: 2020-08-21 | End: 2020-08-31

## 2020-08-21 RX ORDER — SERTRALINE HYDROCHLORIDE 25 MG/1
25 TABLET, FILM COATED ORAL DAILY
Qty: 30 TABLET | Refills: 11 | Status: SHIPPED | OUTPATIENT
Start: 2020-08-21 | End: 2020-10-02

## 2020-08-21 RX ORDER — LORATADINE 10 MG/1
10 TABLET ORAL DAILY
Qty: 15 TABLET | Refills: 0 | Status: SHIPPED | OUTPATIENT
Start: 2020-08-21 | End: 2020-09-05

## 2020-08-21 RX ORDER — SIMVASTATIN 40 MG/1
40 TABLET, FILM COATED ORAL NIGHTLY
Qty: 90 TABLET | Refills: 6 | Status: SHIPPED | OUTPATIENT
Start: 2020-08-21 | End: 2021-01-13 | Stop reason: SDUPTHER

## 2020-08-21 NOTE — PROGRESS NOTES
Subjective:       Patient ID: Charla Patrick is a 60 y.o. female.    Chief Complaint: Establish Care    HPI     Patient is known to me. I did a virtual visit with her yesterday as a new patient. She has just moved to the area and would like to establish care with Dr. Vela. Virtual visit had plenty of technical difficulties, so it was converted into an audio visit. Patient was complaining of postnasal drip and feeling of fullness in left ear. I prescribed her flonase and claritin. She presents today for a physical exam and to get details of her history into her chart, as we had difficulties with audio during our visit.     In addition, patient states that anxiety is taking over her life. Worrying about multiple things each day for about the last month. Her mind is racing and she is feeling very tearful. No lack of energy. Not sluggish or tired. Appetite is decreased. No thoughts of harming self. She just moved to a new place and got a new job. She also is responsible for taking care of her elderly mother and she feels very overwhelmed. She has a history of a hysterectomy and has been noting pain with sex and vaginal dryness. She has no sexual desire.     Review of Systems   Constitutional: Negative for chills and fever.   HENT: Positive for postnasal drip. Negative for nasal congestion, ear discharge, ear pain, rhinorrhea, sinus pressure/congestion and sore throat.    Eyes: Negative for visual disturbance.   Respiratory: Negative for cough, chest tightness, shortness of breath and wheezing.    Cardiovascular: Negative for chest pain and palpitations.   Gastrointestinal: Positive for reflux (controlled on medication). Negative for abdominal pain, change in bowel habit, constipation, diarrhea, nausea, vomiting and change in bowel habit.   Genitourinary: Positive for dyspareunia, vaginal discharge (baseline clear vaginal discharge), vaginal pain and vaginal dryness. Negative for difficulty urinating, dysuria, flank  pain, frequency, hematuria, urgency and vaginal bleeding.   Musculoskeletal: Negative for arthralgias and myalgias.   Integumentary:  Negative for rash.   Neurological: Negative for dizziness, syncope, light-headedness and headaches.   Psychiatric/Behavioral: Negative for sleep disturbance. The patient is nervous/anxious.             Objective:      Physical Exam  Vitals signs reviewed.   Constitutional:       General: She is not in acute distress.     Appearance: Normal appearance. She is not ill-appearing, toxic-appearing or diaphoretic.   HENT:      Head: Normocephalic and atraumatic.      Right Ear: Tympanic membrane, ear canal and external ear normal. There is no impacted cerumen.      Left Ear: Tympanic membrane, ear canal and external ear normal. There is no impacted cerumen.      Nose: Congestion present. No rhinorrhea.      Mouth/Throat:      Mouth: Mucous membranes are moist.      Pharynx: No oropharyngeal exudate or posterior oropharyngeal erythema (mild posterior pharnygeal erythema ).   Eyes:      General: No scleral icterus.        Left eye: No discharge.      Extraocular Movements: Extraocular movements intact.      Conjunctiva/sclera: Conjunctivae normal.      Pupils: Pupils are equal, round, and reactive to light.   Neck:      Musculoskeletal: Normal range of motion and neck supple.   Cardiovascular:      Rate and Rhythm: Normal rate and regular rhythm.      Pulses: Normal pulses.      Heart sounds: Normal heart sounds. No murmur. No friction rub. No gallop.    Pulmonary:      Effort: Pulmonary effort is normal. No respiratory distress.      Breath sounds: Normal breath sounds. No stridor. No wheezing, rhonchi or rales.   Abdominal:      General: Abdomen is flat. Bowel sounds are normal. There is no distension.      Palpations: Abdomen is soft. There is no mass.      Tenderness: There is no abdominal tenderness. There is no guarding or rebound.   Musculoskeletal:         General: No deformity or  signs of injury.      Right lower leg: No edema.      Left lower leg: No edema.   Lymphadenopathy:      Cervical: No cervical adenopathy.   Skin:     General: Skin is warm.      Coloration: Skin is not jaundiced or pale.      Findings: No lesion or rash.   Neurological:      General: No focal deficit present.      Mental Status: She is alert and oriented to person, place, and time. Mental status is at baseline.   Psychiatric:         Attention and Perception: Attention normal.         Mood and Affect: Mood is anxious.         Speech: Speech normal.         Behavior: Behavior normal. Behavior is cooperative.         Thought Content: Thought content normal.         Cognition and Memory: Cognition and memory normal.         Judgment: Judgment normal.           Assessment:       1. Allergic rhinitis with postnasal drip    2. Type 2 diabetes mellitus without complication, without long-term current use of insulin    3. Preventative health care    4. Hyperlipidemia, unspecified hyperlipidemia type    5. Encounter for screening for malignant neoplasm of respiratory organs    6. Anxiety related to change in role            Plan:       1. Allergic rhinitis with postnasal drip  -prescribed claritin and flonase at virtual visit yesterday. Patient will  today.     2. Type 2 diabetes mellitus without complication, without long-term current use of insulin  - Hemoglobin A1C; Future  - Lipid Panel; Future  - simvastatin (ZOCOR) 40 MG tablet; Take 1 tablet (40 mg total) by mouth every evening.  Dispense: 90 tablet; Refill: 6  - MICROALBUMIN / CREATININE RATIO URINE; Future    3. Preventative health care  - TSH; Future    4. Hyperlipidemia, unspecified hyperlipidemia type  - simvastatin (ZOCOR) 40 MG tablet; Take 1 tablet (40 mg total) by mouth every evening.  Dispense: 90 tablet; Refill: 6    5. Encounter for screening for malignant neoplasm of respiratory organs  - CT Chest Lung Screening Low Dose; Future    6. Anxiety  related to change in role  - sertraline (ZOLOFT) 25 MG tablet; Take 1 tablet (25 mg total) by mouth once daily.  Dispense: 30 tablet; Refill: 11       CARE GAPS:  Got tetanus within last 10 years.   Plans to get shingles shot at personal pharmacy.   Colonoscopy due in 1 year per patient.   Eye exam will be scheduled per patient.    Will request patient records from multiple physicians. Patient recently got CBC and A1C at [facility] per patient.     F/U 1 month virtual visit to discuss efficacy of sertraline.       Took plenty of time to discuss the risks and benefits of SSRI therapy, including expected side effects. Answered patient's questions regarding medication options. Discussed the risk of increase in suicidal thoughts when starting an SSRI. The patient understands this risk and agrees to reach out to a healthcare provider or person patient trusts should ever experience suicidal thoughts. Also discussed importance of not stopping medication abruptly. Will let me know if experiences any adverse effects. Explained it may take 4-6 weeks for medication to have full effect. Gave patient plenty of resources regarding anxiety and depression education.

## 2020-08-21 NOTE — PATIENT INSTRUCTIONS
A few reminders from today:    Pickup your medications form [facility].   Zoloft will be sent in the mail. Let me know if you have any adverse effects.   1 month follow up, virtual visit.     Follow up with me if needed.   Please go to ER/urgent care if after hours or symptoms persist/worsen.       Do not hesitate to get in touch with me should you have any further questions.     Thank you for trusting me with your care!  I wish you health and happiness.    -Jeanna Rascon PA-C      Selective Serotonin Reuptake Inhibitors  Your healthcare provider prescribed a selective serotonin reuptake inhibitor (SSRI) for you. An SSRI is an antidepressant. SSRIs help reduce the extreme sadness, hopelessness, and lack of interest in life that are typical in people with depression. SSRIs are also used to treat panic disorder and obsessive compulsive disorder (OCD).     The name of my SSRI is ____________________________________________.   Guidelines for use  · Follow the fact sheet that came with your medicine. It tells you when and how to take your medicine. Ask for a sheet if you didnt get one.  · Before starting your medicine, tell your healthcare provider if you have:  ¨ Manic depression or bipolar disorder  ¨ Kidney disease  ¨ Thyroid disease  ¨ Diabetes  ¨ Liver disease  ¨ Seizure disorders  ¨ A history or current problem with drug abuse or dependence  · Tell your healthcare provider about any other medicines you are taking. This includes over-the-counter or herbal medicines.  · Take your medicine exactly as directed. This medicine takes several weeks to reach its full effect. Because of this, it is important to take this medicine every day, even if you believe that it is not helping your symptoms. You may need to take this medicine for a few months. Or you may need to take it for the rest of your life. It depends on your symptoms.  · If you miss a dose, take it as soon as you remember--unless its almost time for your next  dose. In that case, skip the dose you missed. Dont take a double dose.  · Take your medicine with food.  · Limit alcohol intake while taking this medicine. Or if possible, dont have any alcohol at all while taking this medicine.  · Dont take an SSRI if you are currently taking a monoamine oxidase inhibitor (MAO inhibitor).  · Dont stop taking your medicine without talking with your healthcare provider. If you wish to stop taking your SSRI, your healthcare provider will need to help you reduce the medicine slowly.  · Before using new over-the-counter medicines, check with the pharmacist to be sure it will not interact with the SSRI.  · Dont share your medicine or use another person's medicine, even if it is the same medicine and dose. Check with your provider if you have trouble affording your prescription.  Possible side effects  Tell your healthcare provider if you have any of these side effects. Dont stop taking the medicine until your healthcare provider tells you to. Mild side effects include:  · Anxiety  · Trouble sleeping  · Nausea  · Diarrhea  · Headaches  · Loss of sex drive or problems with orgasm  · Sweating     When to call your healthcare provider  Call your healthcare provider right away if you have any of the following:  · Increased feelings of depression  · Unusual joint or muscle pain  · Trouble breathing  · Shaking chills  · Excessive excitement  · Trouble controlling your emotions or actions  · Skin rash  · Hives  · Tremors   Date Last Reviewed: 5/1/2017 © 2000-2017 [employer]. [address] [place] [address]. All rights reserved. This information is not intended as a substitute for professional medical care. Always follow your healthcare professional's instructions.        Your Bodys Response to Anxiety    Normal anxiety is part of the bodys natural defense system. It's an alert to a threat that is unknown, vague, or comes from your own internal fears. While  youre in this state, your feelings can range from a vague sense of worry to physical sensations such as a pounding heartbeat. These feelings make you want to react to the threat. An anxiety response is normal in many situations. But when you have an anxiety disorder, the same response can occur at the wrong times.  Anxiety can be helpful  Normal anxiety is a signal from your brain that warns you of a threat and is a normal response to help you prevent something or decrease the bad effects of something you can't control. For example, anxiety is a normal response to situations that might damage your body, separate you from a loved one, or lose your job. The symptoms of anxiety can be physical and mental.  How does it feel?  At certain times, people with anxiety may have:  · Dizziness  · Muscle tension or pain  · Restlessness  · Sleeplessness  · Trouble concentrating  · Racing heartbeat  · Fast breathing  · Shaking or trembling  · Stomachache  · Diarrhea  · Loss of energy  · Sweating  · Cold, clammy hands  · Chest pain  · Dry mouth  Anxiety can also be a problem  Anxiety can become a problem when it is hard to control, occurs for months, and interferes with important parts of your life. With an anxiety disorder, your body has the response described above, but in inappropriate ways. The response a person has depends on the anxiety disorder he or she has. With some disorders, the anxiety is way out of proportion to the threat that triggers it. With others, anxiety may occur even when there isnt a clear threat or trigger.  Who does it affect?  Some people are more prone to persistent anxiety than others. It tends to run in families, and it affects more younger people than older people, and more women than men. But no age, race, or gender is immune to anxiety problems.  Anxiety can be treated  The good news is that the anxiety thats disrupting your life can be treated. Check with your healthcare provider and rule out  "any physical problems that may be causing the anxiety symptoms. If an anxiety disorder is diagnosed seek mental healthcare. This is an illness and it can respond to treatment. Most types of anxiety disorders will respond to "talk therapy" and medicines. Working with your doctor or other healthcare provider, you can develop skills to help you cope with anxiety. You can also gain the perspective you need to overcome your fears. Note: Good sources of support or guidance can be found at your local hospital, mental health clinic, or an employee assistance program.  How to cope with anxiety  If anxiety is wearing you down, here are some things you can do to cope:  · Keep in mind that you cant control everything about a situation. Change what you can and let the rest take its course.  · Exercise--its a great way to relieve tension and help your body feel relaxed.  · Avoid caffeine and nicotine, which can make anxiety symptoms worse.  · Fight the temptation to turn to alcohol or unprescribed drugs for relief. They only make things worse in the long run.  · Educate yourself about anxiety disorders. Keep track of helpful online resources and books you can use during stressful periods.  · Try stress management techniques such as meditation.  · Consider online or in-person support groups.   Date Last Reviewed: 1/1/2017  © 2076-7318 [employer]. [address] [place] [address], [place] [address]. All rights reserved. This information is not intended as a substitute for professional medical care. Always follow your healthcare professional's instructions.        "

## 2020-08-21 NOTE — TELEPHONE ENCOUNTER
----- Message from Shellie Zuluaga sent at 8/20/2020  4:26 PM CDT -----  Pt called stating that the prescriptions need to be in her  name of Celina so that the insurance will cover it please resend to pharmacy     Pt can be reached at [phone]

## 2020-08-25 ENCOUNTER — TELEPHONE (OUTPATIENT)
Dept: FAMILY MEDICINE | Facility: CLINIC | Age: 60
End: 2020-08-25

## 2020-08-25 NOTE — TELEPHONE ENCOUNTER
----- Message from Jack Delcid sent at 8/25/2020  8:13 AM CDT -----  Contact: Maya  Type: Needs Medical Advice  Who Called: Maya with [facility]  Symptoms (please be specific):    How long has patient had these symptoms:    Pharmacy name and phone #:    Best Call Back Number: [phone]  Additional Information: requesting order this morning for labs fax to ,patient at the facility now.

## 2020-08-25 NOTE — TELEPHONE ENCOUNTER
Called patient labs were changed to [facility]. If any problems please call us back. Patient states verbal understanding.

## 2020-08-26 ENCOUNTER — HOSPITAL ENCOUNTER (OUTPATIENT)
Dept: RADIOLOGY | Facility: HOSPITAL | Age: 60
Discharge: HOME | End: 2020-08-26
Attending: PHYSICIAN ASSISTANT
Payer: OTHER GOVERNMENT

## 2020-08-26 DIAGNOSIS — Z12.31 BREAST CANCER SCREENING BY MAMMOGRAM: ICD-10-CM

## 2020-08-26 DIAGNOSIS — Z00.00 PREVENTATIVE HEALTH CARE: ICD-10-CM

## 2020-08-26 LAB
ALBUMIN/CREAT UR: 5 MCG/MG CREAT
CHOLEST SERPL-MCNC: 144 MG/DL
CHOLEST/HDLC SERPL: 2.7 (CALC)
CREAT UR-MCNC: 197 MG/DL (ref 20–275)
HBA1C MFR BLD: 6 % OF TOTAL HGB
HDLC SERPL-MCNC: 53 MG/DL
LDLC SERPL CALC-MCNC: 68 MG/DL (CALC)
MICROALBUMIN UR-MCNC: 0.9 MG/DL
NONHDLC SERPL-MCNC: 91 MG/DL (CALC)
TRIGL SERPL-MCNC: 145 MG/DL
TSH SERPL-ACNC: 1.62 MIU/L (ref 0.4–4.5)

## 2020-08-26 PROCEDURE — 77067 MAMMO DIGITAL SCREENING BILAT WITH TOMOSYNTHESIS_CAD: ICD-10-PCS | Mod: 26,,, | Performed by: RADIOLOGY

## 2020-08-26 PROCEDURE — 77063 BREAST TOMOSYNTHESIS BI: CPT | Mod: 26,,, | Performed by: RADIOLOGY

## 2020-08-26 PROCEDURE — 77067 SCR MAMMO BI INCL CAD: CPT | Mod: TC,PO

## 2020-08-26 PROCEDURE — 77067 SCR MAMMO BI INCL CAD: CPT | Mod: 26,,, | Performed by: RADIOLOGY

## 2020-08-26 PROCEDURE — 77063 MAMMO DIGITAL SCREENING BILAT WITH TOMOSYNTHESIS_CAD: ICD-10-PCS | Mod: 26,,, | Performed by: RADIOLOGY

## 2020-08-27 ENCOUNTER — TELEPHONE (OUTPATIENT)
Dept: FAMILY MEDICINE | Facility: CLINIC | Age: 60
End: 2020-08-27

## 2020-08-27 NOTE — TELEPHONE ENCOUNTER
----- Message from Jenana Rascon PA-C sent at 8/23/2020  4:51 PM CDT -----  Please help patient schedule lab visit and LDCT chest

## 2020-09-10 ENCOUNTER — TELEPHONE (OUTPATIENT)
Dept: FAMILY MEDICINE | Facility: CLINIC | Age: 60
End: 2020-09-10

## 2020-09-10 NOTE — TELEPHONE ENCOUNTER
Note     ----- Message from Landy Santana sent at 9/10/2020  8:20 AM CDT -----  Type: Needs Medical Advice  Who Called:  pt  Best Call Back Number: [phone]  Additional Information:states that she believes she is having reaction to zoloft causing a rush feeling like being on a roller coaster and heart beats really fast in throat. States that she takes half of a pill now. Please give call back to discuss

## 2020-09-10 NOTE — TELEPHONE ENCOUNTER
Spoke with patient. Informed her to stop taking the medication. She started taking 1/2 of the medication 3 days ago to see if the symptoms would get better. She took the dosage for today already. Please advise.

## 2020-09-10 NOTE — TELEPHONE ENCOUNTER
----- Message from Landy Santana sent at 9/10/2020  8:20 AM CDT -----  Type: Needs Medical Advice  Who Called:  pt  Best Call Back Number: [phone]  Additional Information:states that she believes she is having reaction to zoloft causing a rush feeling like being on a roller coaster and heart beats really fast in throat. States that she takes half of a pill now. Please give call back to discuss

## 2020-09-14 ENCOUNTER — TELEPHONE (OUTPATIENT)
Dept: FAMILY MEDICINE | Facility: CLINIC | Age: 60
End: 2020-09-14

## 2020-09-14 NOTE — TELEPHONE ENCOUNTER
----- Message from Cheryl Barr sent at 9/14/2020  8:24 AM CDT -----  Contact: pt  Type:  Patient Returning Call    Who Called:   pt  Who Left Message for Patient:   LALO  Does the patient know what this is regarding?:  issues with zoloft  Best Call Back Number:  [phone]  Additional Information:  Please Advise ---Thank you

## 2020-09-17 ENCOUNTER — OFFICE VISIT (OUTPATIENT)
Dept: FAMILY MEDICINE | Facility: CLINIC | Age: 60
End: 2020-09-17
Payer: OTHER GOVERNMENT

## 2020-09-17 ENCOUNTER — TELEPHONE (OUTPATIENT)
Dept: RHEUMATOLOGY | Facility: CLINIC | Age: 60
End: 2020-09-17

## 2020-09-17 VITALS
DIASTOLIC BLOOD PRESSURE: 70 MMHG | HEIGHT: 65 IN | TEMPERATURE: 98 F | SYSTOLIC BLOOD PRESSURE: 110 MMHG | BODY MASS INDEX: 28.61 KG/M2 | OXYGEN SATURATION: 98 % | WEIGHT: 171.75 LBS | HEART RATE: 64 BPM

## 2020-09-17 DIAGNOSIS — J30.1 SEASONAL ALLERGIC RHINITIS DUE TO POLLEN: ICD-10-CM

## 2020-09-17 DIAGNOSIS — Z86.010 HISTORY OF COLON POLYPS: ICD-10-CM

## 2020-09-17 DIAGNOSIS — Z12.2 ENCOUNTER FOR SCREENING FOR MALIGNANT NEOPLASM OF RESPIRATORY ORGANS: ICD-10-CM

## 2020-09-17 DIAGNOSIS — E11.9 TYPE 2 DIABETES MELLITUS WITHOUT COMPLICATION, WITHOUT LONG-TERM CURRENT USE OF INSULIN: ICD-10-CM

## 2020-09-17 DIAGNOSIS — F17.200 TOBACCO DEPENDENCE: ICD-10-CM

## 2020-09-17 DIAGNOSIS — F41.1 ANXIETY RELATED TO CHANGE IN ROLE: ICD-10-CM

## 2020-09-17 DIAGNOSIS — L40.50 PSORIATIC ARTHRITIS: Primary | ICD-10-CM

## 2020-09-17 PROCEDURE — 99999 PR PBB SHADOW E&M-EST. PATIENT-LVL V: CPT | Mod: PBBFAC,,, | Performed by: INTERNAL MEDICINE

## 2020-09-17 PROCEDURE — 99214 OFFICE O/P EST MOD 30 MIN: CPT | Mod: S$PBB,,, | Performed by: INTERNAL MEDICINE

## 2020-09-17 PROCEDURE — 99999 PR PBB SHADOW E&M-EST. PATIENT-LVL V: ICD-10-PCS | Mod: PBBFAC,,, | Performed by: INTERNAL MEDICINE

## 2020-09-17 PROCEDURE — 99215 OFFICE O/P EST HI 40 MIN: CPT | Mod: PBBFAC,PO | Performed by: INTERNAL MEDICINE

## 2020-09-17 PROCEDURE — 99214 PR OFFICE/OUTPT VISIT, EST, LEVL IV, 30-39 MIN: ICD-10-PCS | Mod: S$PBB,,, | Performed by: INTERNAL MEDICINE

## 2020-09-17 RX ORDER — HYDROXYZINE HYDROCHLORIDE 10 MG/1
10 TABLET, FILM COATED ORAL 3 TIMES DAILY PRN
Qty: 30 TABLET | Refills: 0 | Status: SHIPPED | OUTPATIENT
Start: 2020-09-17 | End: 2020-10-02

## 2020-09-17 NOTE — PROGRESS NOTES
"  Patient ID: Charla Patrick     Chief Complaint:   Chief Complaint   Patient presents with    Discuss medications        HPI: New Patient to me but she has seen my PA for an initial office visit. At that time, she started Zoloft 25 mg and then cut it in 1/2 due to side effects of feeling a "roller coaster" in her chest going up to her neck. She has since weaned it off and symptoms are fading. She gives a good history of medication sensitivities, so I'll try Atarax 10 mg three times daily as needed anxiety. Past Medical History of hyperlipidemia and diabetes mellitus that are well Controlled. She will see Dr. ARIAN French for Psoriatic Arthritis in March- stable on Otezla.     Review of Systems   Constitutional: Negative.    HENT: Negative.    Eyes: Negative.    Respiratory: Negative.    Cardiovascular: Negative.    Gastrointestinal: Negative.    Endocrine: Negative.    Genitourinary: Negative.    Musculoskeletal: Negative.    Skin: Negative.    Allergic/Immunologic: Negative.    Neurological: Negative.    Hematological: Negative.    Psychiatric/Behavioral: The patient is nervous/anxious.           Objective:      Physical Exam   Physical Exam  Vitals signs and nursing note reviewed.   Constitutional:       Appearance: Normal appearance. She is well-developed.   HENT:      Head: Normocephalic and atraumatic.      Nose: Nose normal.   Eyes:      Extraocular Movements: Extraocular movements intact.      Conjunctiva/sclera: Conjunctivae normal.      Pupils: Pupils are equal, round, and reactive to light.   Neck:      Musculoskeletal: Normal range of motion and neck supple.   Cardiovascular:      Rate and Rhythm: Normal rate and regular rhythm.      Pulses: Normal pulses.      Heart sounds: Normal heart sounds.   Pulmonary:      Effort: Pulmonary effort is normal.      Breath sounds: Normal breath sounds.   Abdominal:      General: Bowel sounds are normal.      Palpations: Abdomen is soft.   Musculoskeletal: Normal " "range of motion.   Skin:     General: Skin is warm and dry.      Capillary Refill: Capillary refill takes less than 2 seconds.   Neurological:      General: No focal deficit present.      Mental Status: She is alert and oriented to person, place, and time.   Psychiatric:         Mood and Affect: Mood normal.         Behavior: Behavior normal.         Thought Content: Thought content normal.         Judgment: Judgment normal.            Vitals:   Vitals:    09/17/20 1542   BP: 110/70   Pulse: 64   Temp: 98.1 °F (36.7 °C)   TempSrc: Oral   SpO2: 98%   Weight: 77.9 kg (171 lb 11.8 oz)   Height: 5' 5" (1.651 m)          Current Outpatient Medications:     apremilast (OTEZLA) 30 mg Tab, Take 30 mg by mouth 2 (two) times daily., Disp: , Rfl:     famotidine (PEPCID) 40 MG tablet, Take 40 mg by mouth once daily., Disp: , Rfl:     glimepiride (AMARYL) 1 MG tablet, Take 1 mg by mouth before breakfast., Disp: , Rfl:     pantoprazole (PROTONIX) 40 MG tablet, Take 40 mg by mouth once daily., Disp: , Rfl:     simvastatin (ZOCOR) 40 MG tablet, Take 1 tablet (40 mg total) by mouth every evening., Disp: 90 tablet, Rfl: 6    tiZANidine (ZANAFLEX) 4 MG tablet, Take 4 mg by mouth every 6 (six) hours as needed., Disp: , Rfl:     hydrOXYzine HCL (ATARAX) 10 MG Tab, Take 1 tablet (10 mg total) by mouth 3 (three) times daily as needed (anxiety)., Disp: 30 tablet, Rfl: 0    loratadine (CLARITIN) 10 mg tablet, Take 1 tablet (10 mg total) by mouth once daily. for 15 days, Disp: 15 tablet, Rfl: 0    sertraline (ZOLOFT) 25 MG tablet, Take 1 tablet (25 mg total) by mouth once daily. (Patient not taking: Reported on 9/17/2020), Disp: 30 tablet, Rfl: 11   Assessment:       Patient Active Problem List    Diagnosis Date Noted    Diabetes mellitus, type 2     Allergic rhinitis     History of colon polyps     Hyperlipidemia 08/21/2020    Type 2 diabetes mellitus, without long-term current use of insulin 08/20/2020    Psoriatic arthritis " 08/20/2020    Gastroesophageal reflux disease without esophagitis 08/20/2020          Plan:       Charla Patrick  was seen today for follow-up and may need lab work.    Diagnoses and all orders for this visit:    Charla was seen today for discuss medications.    Diagnoses and all orders for this visit:    Psoriatic arthritis  -     Cancel: Ambulatory referral/consult to Rheumatology; Future  -     Ambulatory referral/consult to Rheumatology; Future    Seasonal allergic rhinitis due to pollen  Monitor     History of colon polyps  Colon in 2 years     Encounter for screening for malignant neoplasm of respiratory organs  LDLCT     Tobacco dependence    Anxiety related to change in role  -     hydrOXYzine HCL (ATARAX) 10 MG Tab; Take 1 tablet (10 mg total) by mouth 3 (three) times daily as needed (anxiety).    Type 2 diabetes mellitus without complication, without long-term current use of insulin  -     Diabetic Eye Screening Photo; Future  Controlled

## 2020-09-17 NOTE — PATIENT INSTRUCTIONS
Let me know when you get you labs done at [facility] because I would like to see them     Let me know how the new med is doing and schedule video visit if needed

## 2020-09-17 NOTE — TELEPHONE ENCOUNTER
----- Message from Etta Burris sent at 9/17/2020  9:15 AM CDT -----  Regarding: New Appointment  Type:  Sooner Apoointment Request    Caller is requesting a sooner appointment.  Caller declined first available appointment listed below.  Caller will not accept being placed on the waitlist and is requesting a message be sent to doctor.    Name of Caller:  Pt  When is the first available appointment?  2021  Symptoms:  arthritis   Best Call Back Number:  [phone]  Additional Information:  call after 3

## 2020-09-17 NOTE — TELEPHONE ENCOUNTER
Spoke with pt, pt has seen a rheumatologist in [place], got her scheduled for 3/01/21 at 11 am, pt took our fax number and stated she would have her records transferred to our office

## 2020-09-18 ENCOUNTER — TELEPHONE (OUTPATIENT)
Dept: ADMINISTRATIVE | Facility: HOSPITAL | Age: 60
End: 2020-09-18

## 2020-09-18 ENCOUNTER — PATIENT MESSAGE (OUTPATIENT)
Dept: ADMINISTRATIVE | Facility: HOSPITAL | Age: 60
End: 2020-09-18

## 2020-09-18 NOTE — LETTER
AUTHORIZATION FOR RELEASE OF   CONFIDENTIAL INFORMATION    Dear Kit Vickers,     We are seeing Charla Patrick, date of birth 1960, in the clinic at [facility]. Deyvi Vela MD is the patient's PCP. Charla Patrick has an outstanding lab/procedure at the time we reviewed her chart. In order to help keep her health information updated, she has authorized us to request the following medical record(s):                                       (X  )  COLONOSCOPY            Please fax records to [facility], John C Oubre, MD, [phone].     If you have any questions, please contact  Marie Sheffield, Care Coordinator  [facility]  Phone: [phone]  FAX: [phone]            Patient Name: Charla Patrick  : 1960  Patient Phone #: [phone]

## 2020-09-21 ENCOUNTER — HOSPITAL ENCOUNTER (OUTPATIENT)
Dept: RADIOLOGY | Facility: HOSPITAL | Age: 60
Discharge: HOME | End: 2020-09-21
Attending: PHYSICIAN ASSISTANT
Payer: OTHER GOVERNMENT

## 2020-09-21 ENCOUNTER — CLINICAL SUPPORT (OUTPATIENT)
Dept: FAMILY MEDICINE | Facility: CLINIC | Age: 60
End: 2020-09-21
Attending: INTERNAL MEDICINE
Payer: OTHER GOVERNMENT

## 2020-09-21 DIAGNOSIS — Z12.2 ENCOUNTER FOR SCREENING FOR MALIGNANT NEOPLASM OF RESPIRATORY ORGANS: ICD-10-CM

## 2020-09-21 DIAGNOSIS — E11.9 TYPE 2 DIABETES MELLITUS WITHOUT COMPLICATION, WITHOUT LONG-TERM CURRENT USE OF INSULIN: ICD-10-CM

## 2020-09-21 PROCEDURE — 99999 PR PBB SHADOW E&M-EST. PATIENT-LVL I: ICD-10-PCS | Mod: PBBFAC,,,

## 2020-09-21 PROCEDURE — G0297 CT CHEST LUNG SCREENING LOW DOSE: ICD-10-PCS | Mod: 26,,, | Performed by: RADIOLOGY

## 2020-09-21 PROCEDURE — G0297 LDCT FOR LUNG CA SCREEN: HCPCS | Mod: TC,PO

## 2020-09-21 PROCEDURE — G0297 LDCT FOR LUNG CA SCREEN: HCPCS | Mod: 26,,, | Performed by: RADIOLOGY

## 2020-09-21 PROCEDURE — 99211 OFF/OP EST MAY X REQ PHY/QHP: CPT | Mod: PBBFAC,25,PO

## 2020-09-21 PROCEDURE — 99999 PR PBB SHADOW E&M-EST. PATIENT-LVL I: CPT | Mod: PBBFAC,,,

## 2020-09-25 ENCOUNTER — PATIENT MESSAGE (OUTPATIENT)
Dept: FAMILY MEDICINE | Facility: CLINIC | Age: 60
End: 2020-09-25

## 2020-09-27 NOTE — TELEPHONE ENCOUNTER
I agree that hydroxyzine isn't a good choice due to those side effects. Let's do a video visit this week to discuss other meds.

## 2020-09-28 ENCOUNTER — PATIENT MESSAGE (OUTPATIENT)
Dept: FAMILY MEDICINE | Facility: CLINIC | Age: 60
End: 2020-09-28

## 2020-09-30 ENCOUNTER — PATIENT OUTREACH (OUTPATIENT)
Dept: ADMINISTRATIVE | Facility: HOSPITAL | Age: 60
End: 2020-09-30

## 2020-10-02 ENCOUNTER — OFFICE VISIT (OUTPATIENT)
Dept: FAMILY MEDICINE | Facility: CLINIC | Age: 60
End: 2020-10-02
Payer: OTHER GOVERNMENT

## 2020-10-02 DIAGNOSIS — F41.1 ANXIETY RELATED TO CHANGE IN ROLE: ICD-10-CM

## 2020-10-02 PROCEDURE — 99214 PR OFFICE/OUTPT VISIT, EST, LEVL IV, 30-39 MIN: ICD-10-PCS | Mod: 95,,, | Performed by: INTERNAL MEDICINE

## 2020-10-02 PROCEDURE — 99214 OFFICE O/P EST MOD 30 MIN: CPT | Mod: 95,,, | Performed by: INTERNAL MEDICINE

## 2020-10-02 RX ORDER — ALPRAZOLAM 0.5 MG/1
0.5 TABLET ORAL 2 TIMES DAILY PRN
Qty: 30 TABLET | Refills: 0 | Status: SHIPPED | OUTPATIENT
Start: 2020-10-02 | End: 2021-01-13

## 2020-10-02 NOTE — PROGRESS NOTES
"Patient ID: Charla Patrick     Chief Complaint: Follow up anxiety     The patient location is: [place]   The chief complaint leading to consultation is: Follow up anxiety    Visit type: audiovisual    Face to Face time with patient: 10 minutes   15 minutes of total time spent on the encounter, which includes face to face time and non-face to face time preparing to see the patient (eg, review of tests), Obtaining and/or reviewing separately obtained history, Documenting clinical information in the electronic or other health record, Independently interpreting results (not separately reported) and communicating results to the patient/family/caregiver, or Care coordination (not separately reported).         Each patient to whom he or she provides medical services by telemedicine is:  (1) informed of the relationship between the physician and patient and the respective role of any other health care provider with respect to management of the patient; and (2) notified that he or she may decline to receive medical services by telemedicine and may withdraw from such care at any time.    Notes:        HPI: Follow up anxiety after she stopped Zoloft due to a "rush like" feeling in her chest and lump in her throat. We tried Atarax, but her anxiety persisted and the lump in her thraot returned. Will try Xanax 0.5 mg twice daily (cut in 1/2 at first) and Monitor in 2 weeks. She saw an advertisement regarding Otezla and depression, but her symptoms seem more like anxiety to me.     Review of Systems   Constitutional: Negative.  Negative for activity change and unexpected weight change.   HENT: Negative.  Negative for hearing loss, rhinorrhea and trouble swallowing.    Eyes: Negative.  Negative for discharge and visual disturbance.   Respiratory: Negative.  Negative for chest tightness and wheezing.    Cardiovascular: Positive for palpitations. Negative for chest pain.   Gastrointestinal: Negative.  Negative for blood in stool, " constipation, diarrhea and vomiting.   Endocrine: Negative.  Negative for polydipsia and polyuria.   Genitourinary: Negative.  Negative for difficulty urinating, dysuria, hematuria and menstrual problem.   Musculoskeletal: Negative.  Negative for arthralgias, joint swelling and neck pain.   Skin: Negative.    Allergic/Immunologic: Negative.    Neurological: Negative.  Negative for weakness and headaches.   Hematological: Negative.    Psychiatric/Behavioral: Positive for dysphoric mood. Negative for confusion.          Objective:      Physical Exam   Physical Exam  Constitutional:       Appearance: Normal appearance.   HENT:      Head: Normocephalic and atraumatic.      Mouth/Throat:      Mouth: Mucous membranes are moist.   Eyes:      Extraocular Movements: Extraocular movements intact.      Conjunctiva/sclera: Conjunctivae normal.   Pulmonary:      Effort: Pulmonary effort is normal.   Musculoskeletal: Normal range of motion.   Neurological:      General: No focal deficit present.      Mental Status: She is alert and oriented to person, place, and time.   Psychiatric:         Mood and Affect: Mood normal.         Behavior: Behavior normal.         Thought Content: Thought content normal.         Judgment: Judgment normal.            Vitals: There were no vitals filed for this visit.       Current Outpatient Medications:     ALPRAZolam (XANAX) 0.5 MG tablet, Take 1 tablet (0.5 mg total) by mouth 2 (two) times daily as needed for Anxiety., Disp: 30 tablet, Rfl: 0    apremilast (OTEZLA) 30 mg Tab, Take 30 mg by mouth 2 (two) times daily., Disp: , Rfl:     famotidine (PEPCID) 40 MG tablet, Take 40 mg by mouth once daily., Disp: , Rfl:     glimepiride (AMARYL) 1 MG tablet, Take 1 mg by mouth before breakfast., Disp: , Rfl:     loratadine (CLARITIN) 10 mg tablet, Take 1 tablet (10 mg total) by mouth once daily. for 15 days, Disp: 15 tablet, Rfl: 0    pantoprazole (PROTONIX) 40 MG tablet, Take 40 mg by mouth once  daily., Disp: , Rfl:     simvastatin (ZOCOR) 40 MG tablet, Take 1 tablet (40 mg total) by mouth every evening., Disp: 90 tablet, Rfl: 6    tiZANidine (ZANAFLEX) 4 MG tablet, Take 4 mg by mouth every 6 (six) hours as needed., Disp: , Rfl:    Assessment:       Patient Active Problem List    Diagnosis Date Noted    Diabetes mellitus, type 2     Allergic rhinitis     History of colon polyps     Hyperlipidemia 08/21/2020    Type 2 diabetes mellitus, without long-term current use of insulin 08/20/2020    Psoriatic arthritis 08/20/2020    Gastroesophageal reflux disease without esophagitis 08/20/2020          Plan:       Charla Patrick  was seen today for follow-up and may need lab work.    Diagnoses and all orders for this visit:    Diagnoses and all orders for this visit:    Anxiety related to change in role  -     ALPRAZolam (XANAX) 0.5 MG tablet; Take 1 tablet (0.5 mg total) by mouth 2 (two) times daily as needed for Anxiety.

## 2020-10-13 ENCOUNTER — TELEPHONE (OUTPATIENT)
Dept: RHEUMATOLOGY | Facility: CLINIC | Age: 60
End: 2020-10-13

## 2020-10-13 ENCOUNTER — PATIENT MESSAGE (OUTPATIENT)
Dept: FAMILY MEDICINE | Facility: CLINIC | Age: 60
End: 2020-10-13

## 2020-10-13 NOTE — TELEPHONE ENCOUNTER
----- Message from Emily Platt sent at 10/13/2020 12:17 PM CDT -----  Regarding: reschedule  Contact: pt  Pt states she would like to reschedule appt to mid April    Pt can be reached at [phone]    LV that she is now booked 4-19-21 at 9 am.

## 2020-11-10 NOTE — PROGRESS NOTES
Assessment /Plan     For exam results, see Encounter Report.    Type 2 diabetes mellitus without complication, without long-term current use of insulin  -     Diabetic Eye Screening Photo

## 2020-11-12 ENCOUNTER — PATIENT MESSAGE (OUTPATIENT)
Dept: FAMILY MEDICINE | Facility: CLINIC | Age: 60
End: 2020-11-12

## 2020-11-14 ENCOUNTER — TELEPHONE (OUTPATIENT)
Dept: FAMILY MEDICINE | Facility: CLINIC | Age: 60
End: 2020-11-14

## 2020-11-15 NOTE — TELEPHONE ENCOUNTER
I think that was a problem with processing her diabetic eye exam photos.  She may need to be rescheduled.

## 2020-11-15 NOTE — TELEPHONE ENCOUNTER
----- Message from Tan Alcantar, OD sent at 11/10/2020  9:49 AM CST -----  No diabetic eyecam pics found in system, searched name and clinic number. Needs dilated eye exam

## 2020-11-18 ENCOUNTER — PATIENT MESSAGE (OUTPATIENT)
Dept: FAMILY MEDICINE | Facility: CLINIC | Age: 60
End: 2020-11-18

## 2020-12-03 ENCOUNTER — PATIENT MESSAGE (OUTPATIENT)
Dept: FAMILY MEDICINE | Facility: CLINIC | Age: 60
End: 2020-12-03

## 2020-12-03 DIAGNOSIS — E11.9 TYPE 2 DIABETES MELLITUS WITHOUT COMPLICATION, WITHOUT LONG-TERM CURRENT USE OF INSULIN: Primary | ICD-10-CM

## 2020-12-03 NOTE — TELEPHONE ENCOUNTER
Patient eye exam was not readable and you wanted her to see ophthalmology but there is no referral. Referral pended. Please advise

## 2020-12-07 ENCOUNTER — TELEPHONE (OUTPATIENT)
Dept: FAMILY MEDICINE | Facility: CLINIC | Age: 60
End: 2020-12-07

## 2020-12-07 NOTE — TELEPHONE ENCOUNTER
----- Message from Romy Pardo sent at 12/7/2020 12:12 PM CST -----  Contact: Patient [phone]  Patient is returning a phone call.  Who left a message for the patient: Renate  Does patient know what this is regarding: n/a    Please call and advise.    Thank You

## 2020-12-08 NOTE — TELEPHONE ENCOUNTER
----- Message from Chula Valdes sent at 12/7/2020  3:36 PM CST -----  Regarding: returning call  Contact: patient  Type:  Patient Returning Call    Who Called:  patient  Who Left Message for Patient:  conor  Does the patient know what this is regarding?:  n/a  Best Call Back Number:  [phone] (home)     Additional Information:  please call back

## 2021-01-13 ENCOUNTER — OFFICE VISIT (OUTPATIENT)
Dept: FAMILY MEDICINE | Facility: CLINIC | Age: 61
End: 2021-01-13
Payer: OTHER GOVERNMENT

## 2021-01-13 ENCOUNTER — LAB VISIT (OUTPATIENT)
Dept: LAB | Facility: HOSPITAL | Age: 61
End: 2021-01-13
Attending: PHYSICIAN ASSISTANT
Payer: OTHER GOVERNMENT

## 2021-01-13 VITALS
HEART RATE: 66 BPM | WEIGHT: 172.81 LBS | TEMPERATURE: 98 F | HEIGHT: 65 IN | OXYGEN SATURATION: 98 % | DIASTOLIC BLOOD PRESSURE: 70 MMHG | BODY MASS INDEX: 28.79 KG/M2 | SYSTOLIC BLOOD PRESSURE: 132 MMHG

## 2021-01-13 DIAGNOSIS — E11.9 TYPE 2 DIABETES MELLITUS WITHOUT COMPLICATION, WITHOUT LONG-TERM CURRENT USE OF INSULIN: ICD-10-CM

## 2021-01-13 DIAGNOSIS — R00.2 PALPITATIONS: Primary | ICD-10-CM

## 2021-01-13 DIAGNOSIS — R09.82 POSTNASAL DRIP: ICD-10-CM

## 2021-01-13 DIAGNOSIS — E78.5 HYPERLIPIDEMIA, UNSPECIFIED HYPERLIPIDEMIA TYPE: ICD-10-CM

## 2021-01-13 DIAGNOSIS — R00.2 PALPITATIONS: ICD-10-CM

## 2021-01-13 PROCEDURE — U0003 INFECTIOUS AGENT DETECTION BY NUCLEIC ACID (DNA OR RNA); SEVERE ACUTE RESPIRATORY SYNDROME CORONAVIRUS 2 (SARS-COV-2) (CORONAVIRUS DISEASE [COVID-19]), AMPLIFIED PROBE TECHNIQUE, MAKING USE OF HIGH THROUGHPUT TECHNOLOGIES AS DESCRIBED BY CMS-2020-01-R: HCPCS

## 2021-01-13 PROCEDURE — 80053 COMPREHEN METABOLIC PANEL: CPT

## 2021-01-13 PROCEDURE — 99214 PR OFFICE/OUTPT VISIT, EST, LEVL IV, 30-39 MIN: ICD-10-PCS | Mod: S$PBB,,, | Performed by: PHYSICIAN ASSISTANT

## 2021-01-13 PROCEDURE — 93010 EKG 12-LEAD: ICD-10-PCS | Mod: S$PBB,,, | Performed by: INTERNAL MEDICINE

## 2021-01-13 PROCEDURE — 93010 ELECTROCARDIOGRAM REPORT: CPT | Mod: S$PBB,,, | Performed by: INTERNAL MEDICINE

## 2021-01-13 PROCEDURE — 93005 ELECTROCARDIOGRAM TRACING: CPT | Mod: PBBFAC,PO | Performed by: INTERNAL MEDICINE

## 2021-01-13 PROCEDURE — 83036 HEMOGLOBIN GLYCOSYLATED A1C: CPT

## 2021-01-13 PROCEDURE — 99214 OFFICE O/P EST MOD 30 MIN: CPT | Mod: S$PBB,,, | Performed by: PHYSICIAN ASSISTANT

## 2021-01-13 PROCEDURE — 99999 PR PBB SHADOW E&M-EST. PATIENT-LVL IV: CPT | Mod: PBBFAC,,, | Performed by: PHYSICIAN ASSISTANT

## 2021-01-13 PROCEDURE — 99999 PR PBB SHADOW E&M-EST. PATIENT-LVL IV: ICD-10-PCS | Mod: PBBFAC,,, | Performed by: PHYSICIAN ASSISTANT

## 2021-01-13 PROCEDURE — 36415 COLL VENOUS BLD VENIPUNCTURE: CPT | Mod: PO

## 2021-01-13 PROCEDURE — 99214 OFFICE O/P EST MOD 30 MIN: CPT | Mod: PBBFAC,PO | Performed by: PHYSICIAN ASSISTANT

## 2021-01-13 RX ORDER — FLUTICASONE PROPIONATE 50 MCG
SPRAY, SUSPENSION (ML) NASAL
COMMUNITY
Start: 2020-08-20 | End: 2021-03-10

## 2021-01-13 RX ORDER — BETAMETHASONE DIPROPIONATE 0.5 MG/G
CREAM TOPICAL
COMMUNITY
Start: 2019-10-17 | End: 2021-03-10

## 2021-01-13 RX ORDER — SIMVASTATIN 40 MG/1
TABLET, FILM COATED ORAL
COMMUNITY
Start: 2020-02-28 | End: 2021-01-13 | Stop reason: ALTCHOICE

## 2021-01-13 RX ORDER — LORATADINE 10 MG/1
TABLET ORAL
COMMUNITY
Start: 2020-08-20 | End: 2021-01-13 | Stop reason: SDUPTHER

## 2021-01-13 RX ORDER — LORATADINE 10 MG/1
10 TABLET ORAL DAILY
Qty: 30 TABLET | Refills: 5 | Status: SHIPPED | OUTPATIENT
Start: 2021-01-13 | End: 2022-03-25

## 2021-01-13 RX ORDER — SIMVASTATIN 40 MG/1
40 TABLET, FILM COATED ORAL NIGHTLY
Qty: 90 TABLET | Refills: 6 | Status: SHIPPED | OUTPATIENT
Start: 2021-01-13 | End: 2022-02-15

## 2021-01-14 ENCOUNTER — PATIENT MESSAGE (OUTPATIENT)
Dept: FAMILY MEDICINE | Facility: CLINIC | Age: 61
End: 2021-01-14

## 2021-01-14 LAB
ALBUMIN SERPL BCP-MCNC: 4.3 G/DL (ref 3.5–5.2)
ALP SERPL-CCNC: 106 U/L (ref 55–135)
ALT SERPL W/O P-5'-P-CCNC: 19 U/L (ref 10–44)
ANION GAP SERPL CALC-SCNC: 9 MMOL/L (ref 8–16)
AST SERPL-CCNC: 17 U/L (ref 10–40)
BILIRUB SERPL-MCNC: 0.2 MG/DL (ref 0.1–1)
BUN SERPL-MCNC: 23 MG/DL (ref 8–23)
CALCIUM SERPL-MCNC: 9.8 MG/DL (ref 8.7–10.5)
CHLORIDE SERPL-SCNC: 103 MMOL/L (ref 95–110)
CO2 SERPL-SCNC: 28 MMOL/L (ref 23–29)
CREAT SERPL-MCNC: 0.9 MG/DL (ref 0.5–1.4)
EST. GFR  (AFRICAN AMERICAN): >60 ML/MIN/1.73 M^2
EST. GFR  (NON AFRICAN AMERICAN): >60 ML/MIN/1.73 M^2
ESTIMATED AVG GLUCOSE: 126 MG/DL (ref 68–131)
GLUCOSE SERPL-MCNC: 84 MG/DL (ref 70–110)
HBA1C MFR BLD HPLC: 6 % (ref 4–5.6)
POTASSIUM SERPL-SCNC: 4.2 MMOL/L (ref 3.5–5.1)
PROT SERPL-MCNC: 7.6 G/DL (ref 6–8.4)
SARS-COV-2 RNA RESP QL NAA+PROBE: NOT DETECTED
SODIUM SERPL-SCNC: 140 MMOL/L (ref 136–145)

## 2021-01-19 ENCOUNTER — PATIENT MESSAGE (OUTPATIENT)
Dept: FAMILY MEDICINE | Facility: CLINIC | Age: 61
End: 2021-01-19

## 2021-01-26 ENCOUNTER — CLINICAL SUPPORT (OUTPATIENT)
Dept: CARDIOLOGY | Facility: CLINIC | Age: 61
End: 2021-01-26
Attending: PHYSICIAN ASSISTANT
Payer: OTHER GOVERNMENT

## 2021-01-26 DIAGNOSIS — R00.2 PALPITATIONS: ICD-10-CM

## 2021-01-26 PROCEDURE — 93227 HOLTER MONITOR - 48 HOUR (CUPID ONLY): ICD-10-PCS | Mod: S$PBB,,, | Performed by: INTERNAL MEDICINE

## 2021-01-26 PROCEDURE — 93227 XTRNL ECG REC<48 HR R&I: CPT | Mod: S$PBB,,, | Performed by: INTERNAL MEDICINE

## 2021-01-26 PROCEDURE — 93226 XTRNL ECG REC<48 HR SCAN A/R: CPT | Mod: PBBFAC,PO | Performed by: INTERNAL MEDICINE

## 2021-01-28 ENCOUNTER — TELEPHONE (OUTPATIENT)
Dept: FAMILY MEDICINE | Facility: CLINIC | Age: 61
End: 2021-01-28

## 2021-01-28 DIAGNOSIS — R00.2 PALPITATIONS: Primary | ICD-10-CM

## 2021-01-28 LAB
OHS CV EVENT MONITOR DAY: 0
OHS CV HOLTER LENGTH DECIMAL HOURS: 48
OHS CV HOLTER LENGTH HOURS: 48
OHS CV HOLTER LENGTH MINUTES: 0

## 2021-02-05 ENCOUNTER — PATIENT OUTREACH (OUTPATIENT)
Dept: ADMINISTRATIVE | Facility: OTHER | Age: 61
End: 2021-02-05

## 2021-02-08 ENCOUNTER — PATIENT MESSAGE (OUTPATIENT)
Dept: FAMILY MEDICINE | Facility: CLINIC | Age: 61
End: 2021-02-08

## 2021-02-08 DIAGNOSIS — K21.9 GASTROESOPHAGEAL REFLUX DISEASE WITHOUT ESOPHAGITIS: Primary | ICD-10-CM

## 2021-02-09 RX ORDER — PANTOPRAZOLE SODIUM 40 MG/1
40 TABLET, DELAYED RELEASE ORAL DAILY
Qty: 30 TABLET | Refills: 4 | Status: SHIPPED | OUTPATIENT
Start: 2021-02-09 | End: 2021-04-30 | Stop reason: SDUPTHER

## 2021-02-10 ENCOUNTER — OFFICE VISIT (OUTPATIENT)
Dept: OPTOMETRY | Facility: CLINIC | Age: 61
End: 2021-02-10
Payer: OTHER GOVERNMENT

## 2021-02-10 DIAGNOSIS — H25.13 NUCLEAR SCLEROSIS OF BOTH EYES: ICD-10-CM

## 2021-02-10 DIAGNOSIS — H04.123 BILATERAL DRY EYES: ICD-10-CM

## 2021-02-10 DIAGNOSIS — E11.9 TYPE 2 DIABETES MELLITUS WITHOUT RETINOPATHY: Primary | ICD-10-CM

## 2021-02-10 PROCEDURE — 99213 OFFICE O/P EST LOW 20 MIN: CPT | Mod: PBBFAC,PO | Performed by: OPTOMETRIST

## 2021-02-10 PROCEDURE — 92004 PR EYE EXAM, NEW PATIENT,COMPREHESV: ICD-10-PCS | Mod: S$PBB,,, | Performed by: OPTOMETRIST

## 2021-02-10 PROCEDURE — 92004 COMPRE OPH EXAM NEW PT 1/>: CPT | Mod: S$PBB,,, | Performed by: OPTOMETRIST

## 2021-02-10 PROCEDURE — 99999 PR PBB SHADOW E&M-EST. PATIENT-LVL III: ICD-10-PCS | Mod: PBBFAC,,, | Performed by: OPTOMETRIST

## 2021-02-10 PROCEDURE — 99999 PR PBB SHADOW E&M-EST. PATIENT-LVL III: CPT | Mod: PBBFAC,,, | Performed by: OPTOMETRIST

## 2021-02-11 ENCOUNTER — OFFICE VISIT (OUTPATIENT)
Dept: CARDIOLOGY | Facility: CLINIC | Age: 61
End: 2021-02-11
Payer: OTHER GOVERNMENT

## 2021-02-11 VITALS
HEART RATE: 76 BPM | DIASTOLIC BLOOD PRESSURE: 83 MMHG | HEIGHT: 65 IN | WEIGHT: 173.5 LBS | SYSTOLIC BLOOD PRESSURE: 143 MMHG | BODY MASS INDEX: 28.91 KG/M2

## 2021-02-11 DIAGNOSIS — E78.5 HYPERLIPIDEMIA, UNSPECIFIED HYPERLIPIDEMIA TYPE: Primary | ICD-10-CM

## 2021-02-11 DIAGNOSIS — I49.3 PVC (PREMATURE VENTRICULAR CONTRACTION): ICD-10-CM

## 2021-02-11 DIAGNOSIS — R00.2 PALPITATIONS: ICD-10-CM

## 2021-02-11 DIAGNOSIS — E11.9 TYPE 2 DIABETES MELLITUS WITHOUT COMPLICATION, WITHOUT LONG-TERM CURRENT USE OF INSULIN: ICD-10-CM

## 2021-02-11 DIAGNOSIS — J30.1 SEASONAL ALLERGIC RHINITIS DUE TO POLLEN: ICD-10-CM

## 2021-02-11 PROCEDURE — 99999 PR PBB SHADOW E&M-EST. PATIENT-LVL III: ICD-10-PCS | Mod: PBBFAC,,, | Performed by: INTERNAL MEDICINE

## 2021-02-11 PROCEDURE — 99213 OFFICE O/P EST LOW 20 MIN: CPT | Mod: PBBFAC,PO | Performed by: INTERNAL MEDICINE

## 2021-02-11 PROCEDURE — 99203 PR OFFICE/OUTPT VISIT, NEW, LEVL III, 30-44 MIN: ICD-10-PCS | Mod: S$PBB,,, | Performed by: INTERNAL MEDICINE

## 2021-02-11 PROCEDURE — 99203 OFFICE O/P NEW LOW 30 MIN: CPT | Mod: S$PBB,,, | Performed by: INTERNAL MEDICINE

## 2021-02-11 PROCEDURE — 99999 PR PBB SHADOW E&M-EST. PATIENT-LVL III: CPT | Mod: PBBFAC,,, | Performed by: INTERNAL MEDICINE

## 2021-03-08 ENCOUNTER — PATIENT MESSAGE (OUTPATIENT)
Dept: RHEUMATOLOGY | Facility: CLINIC | Age: 61
End: 2021-03-08

## 2021-03-08 ENCOUNTER — PATIENT MESSAGE (OUTPATIENT)
Dept: FAMILY MEDICINE | Facility: CLINIC | Age: 61
End: 2021-03-08

## 2021-03-09 ENCOUNTER — PATIENT OUTREACH (OUTPATIENT)
Dept: ADMINISTRATIVE | Facility: OTHER | Age: 61
End: 2021-03-09

## 2021-03-09 RX ORDER — GLIMEPIRIDE 1 MG/1
1 TABLET ORAL
Qty: 90 TABLET | Refills: 3 | Status: SHIPPED | OUTPATIENT
Start: 2021-03-09 | End: 2022-04-29

## 2021-03-10 ENCOUNTER — OFFICE VISIT (OUTPATIENT)
Dept: DERMATOLOGY | Facility: CLINIC | Age: 61
End: 2021-03-10
Payer: OTHER GOVERNMENT

## 2021-03-10 DIAGNOSIS — L29.9 SCALP PRURITUS: ICD-10-CM

## 2021-03-10 DIAGNOSIS — L40.50 PSORIATIC ARTHRITIS: ICD-10-CM

## 2021-03-10 DIAGNOSIS — L73.9 FOLLICULITIS: ICD-10-CM

## 2021-03-10 DIAGNOSIS — L40.9 SCALP PSORIASIS: Primary | ICD-10-CM

## 2021-03-10 PROCEDURE — 99999 PR PBB SHADOW E&M-EST. PATIENT-LVL II: ICD-10-PCS | Mod: PBBFAC,,, | Performed by: DERMATOLOGY

## 2021-03-10 PROCEDURE — 99204 OFFICE O/P NEW MOD 45 MIN: CPT | Mod: S$PBB,,, | Performed by: DERMATOLOGY

## 2021-03-10 PROCEDURE — 99204 PR OFFICE/OUTPT VISIT, NEW, LEVL IV, 45-59 MIN: ICD-10-PCS | Mod: S$PBB,,, | Performed by: DERMATOLOGY

## 2021-03-10 PROCEDURE — 99999 PR PBB SHADOW E&M-EST. PATIENT-LVL II: CPT | Mod: PBBFAC,,, | Performed by: DERMATOLOGY

## 2021-03-10 PROCEDURE — 99212 OFFICE O/P EST SF 10 MIN: CPT | Mod: PBBFAC,PO | Performed by: DERMATOLOGY

## 2021-03-10 PROCEDURE — 87070 CULTURE OTHR SPECIMN AEROBIC: CPT | Performed by: DERMATOLOGY

## 2021-03-10 RX ORDER — KETOCONAZOLE 20 MG/ML
SHAMPOO, SUSPENSION TOPICAL
Qty: 120 ML | Refills: 5 | Status: SHIPPED | OUTPATIENT
Start: 2021-03-10 | End: 2022-03-25

## 2021-03-10 RX ORDER — DOXYCYCLINE HYCLATE 100 MG
TABLET ORAL
Qty: 40 TABLET | Refills: 1 | Status: SHIPPED | OUTPATIENT
Start: 2021-03-10 | End: 2021-03-31 | Stop reason: SDUPTHER

## 2021-03-10 RX ORDER — CLINDAMYCIN PHOSPHATE 11.9 MG/ML
SOLUTION TOPICAL
Qty: 60 ML | Refills: 3 | Status: SHIPPED | OUTPATIENT
Start: 2021-03-10 | End: 2022-03-25

## 2021-03-13 LAB — BACTERIA SPEC AEROBE CULT: NORMAL

## 2021-03-23 ENCOUNTER — PATIENT MESSAGE (OUTPATIENT)
Dept: RHEUMATOLOGY | Facility: CLINIC | Age: 61
End: 2021-03-23

## 2021-03-28 ENCOUNTER — PATIENT MESSAGE (OUTPATIENT)
Dept: FAMILY MEDICINE | Facility: CLINIC | Age: 61
End: 2021-03-28

## 2021-03-31 ENCOUNTER — PATIENT MESSAGE (OUTPATIENT)
Dept: DERMATOLOGY | Facility: CLINIC | Age: 61
End: 2021-03-31

## 2021-03-31 DIAGNOSIS — L73.9 FOLLICULITIS: ICD-10-CM

## 2021-03-31 RX ORDER — DOXYCYCLINE HYCLATE 100 MG
TABLET ORAL
Qty: 60 TABLET | Refills: 1 | Status: SHIPPED | OUTPATIENT
Start: 2021-03-31 | End: 2021-10-12

## 2021-04-19 ENCOUNTER — PATIENT OUTREACH (OUTPATIENT)
Dept: ADMINISTRATIVE | Facility: OTHER | Age: 61
End: 2021-04-19

## 2021-04-19 ENCOUNTER — OFFICE VISIT (OUTPATIENT)
Dept: RHEUMATOLOGY | Facility: CLINIC | Age: 61
End: 2021-04-19
Payer: OTHER GOVERNMENT

## 2021-04-19 VITALS
BODY MASS INDEX: 29.55 KG/M2 | WEIGHT: 177.38 LBS | SYSTOLIC BLOOD PRESSURE: 141 MMHG | HEIGHT: 65 IN | HEART RATE: 69 BPM | DIASTOLIC BLOOD PRESSURE: 83 MMHG

## 2021-04-19 DIAGNOSIS — L40.50 PSORIATIC ARTHRITIS: Primary | ICD-10-CM

## 2021-04-19 DIAGNOSIS — K21.9 GASTROESOPHAGEAL REFLUX DISEASE WITHOUT ESOPHAGITIS: ICD-10-CM

## 2021-04-19 DIAGNOSIS — Z79.899 HIGH RISK MEDICATIONS (NOT ANTICOAGULANTS) LONG-TERM USE: ICD-10-CM

## 2021-04-19 PROCEDURE — 99205 PR OFFICE/OUTPT VISIT, NEW, LEVL V, 60-74 MIN: ICD-10-PCS | Mod: S$PBB,,, | Performed by: INTERNAL MEDICINE

## 2021-04-19 PROCEDURE — 99999 PR PBB SHADOW E&M-EST. PATIENT-LVL III: CPT | Mod: PBBFAC,,, | Performed by: INTERNAL MEDICINE

## 2021-04-19 PROCEDURE — 99213 OFFICE O/P EST LOW 20 MIN: CPT | Mod: PBBFAC,PO | Performed by: INTERNAL MEDICINE

## 2021-04-19 PROCEDURE — 99999 PR PBB SHADOW E&M-EST. PATIENT-LVL III: ICD-10-PCS | Mod: PBBFAC,,, | Performed by: INTERNAL MEDICINE

## 2021-04-19 PROCEDURE — 99205 OFFICE O/P NEW HI 60 MIN: CPT | Mod: S$PBB,,, | Performed by: INTERNAL MEDICINE

## 2021-04-19 ASSESSMENT — ROUTINE ASSESSMENT OF PATIENT INDEX DATA (RAPID3)
MDHAQ FUNCTION SCORE: 0.6
PSYCHOLOGICAL DISTRESS SCORE: 4.4
TOTAL RAPID3 SCORE: 4.5
PATIENT GLOBAL ASSESSMENT SCORE: 5.5
PAIN SCORE: 6

## 2021-04-30 DIAGNOSIS — K21.9 GASTROESOPHAGEAL REFLUX DISEASE WITHOUT ESOPHAGITIS: ICD-10-CM

## 2021-05-03 RX ORDER — PANTOPRAZOLE SODIUM 40 MG/1
40 TABLET, DELAYED RELEASE ORAL DAILY
Qty: 90 TABLET | Refills: 3 | Status: SHIPPED | OUTPATIENT
Start: 2021-05-03 | End: 2022-04-29

## 2021-05-12 ENCOUNTER — PATIENT MESSAGE (OUTPATIENT)
Dept: FAMILY MEDICINE | Facility: CLINIC | Age: 61
End: 2021-05-12

## 2021-05-12 DIAGNOSIS — F41.1 ANXIETY RELATED TO CHANGE IN ROLE: ICD-10-CM

## 2021-05-13 RX ORDER — ALPRAZOLAM 0.5 MG/1
0.5 TABLET ORAL 2 TIMES DAILY PRN
Qty: 30 TABLET | Refills: 0 | Status: SHIPPED | OUTPATIENT
Start: 2021-05-13 | End: 2023-06-02

## 2021-06-04 ENCOUNTER — PATIENT MESSAGE (OUTPATIENT)
Dept: DERMATOLOGY | Facility: CLINIC | Age: 61
End: 2021-06-04

## 2021-06-07 DIAGNOSIS — L73.9 FOLLICULITIS: Primary | ICD-10-CM

## 2021-06-07 RX ORDER — SULFAMETHOXAZOLE AND TRIMETHOPRIM 800; 160 MG/1; MG/1
1 TABLET ORAL 2 TIMES DAILY
Qty: 20 TABLET | Refills: 0 | Status: SHIPPED | OUTPATIENT
Start: 2021-06-07 | End: 2021-06-17

## 2021-06-21 ENCOUNTER — TELEPHONE (OUTPATIENT)
Dept: PODIATRY | Facility: CLINIC | Age: 61
End: 2021-06-21

## 2021-06-28 ENCOUNTER — PATIENT MESSAGE (OUTPATIENT)
Dept: FAMILY MEDICINE | Facility: CLINIC | Age: 61
End: 2021-06-28

## 2021-06-28 DIAGNOSIS — K92.1 HEMATOCHEZIA: Primary | ICD-10-CM

## 2021-06-29 ENCOUNTER — PATIENT MESSAGE (OUTPATIENT)
Dept: FAMILY MEDICINE | Facility: CLINIC | Age: 61
End: 2021-06-29

## 2021-06-30 ENCOUNTER — PATIENT MESSAGE (OUTPATIENT)
Dept: FAMILY MEDICINE | Facility: CLINIC | Age: 61
End: 2021-06-30

## 2021-06-30 ENCOUNTER — TELEPHONE (OUTPATIENT)
Dept: GASTROENTEROLOGY | Facility: CLINIC | Age: 61
End: 2021-06-30

## 2021-07-14 ENCOUNTER — PATIENT OUTREACH (OUTPATIENT)
Dept: ADMINISTRATIVE | Facility: OTHER | Age: 61
End: 2021-07-14

## 2021-07-14 ENCOUNTER — TELEPHONE (OUTPATIENT)
Dept: FAMILY MEDICINE | Facility: CLINIC | Age: 61
End: 2021-07-14

## 2021-07-14 DIAGNOSIS — E11.9 TYPE 2 DIABETES MELLITUS WITHOUT COMPLICATION, WITHOUT LONG-TERM CURRENT USE OF INSULIN: Primary | ICD-10-CM

## 2021-07-15 ENCOUNTER — OFFICE VISIT (OUTPATIENT)
Dept: SURGERY | Facility: CLINIC | Age: 61
End: 2021-07-15
Payer: OTHER GOVERNMENT

## 2021-07-15 VITALS
BODY MASS INDEX: 29.46 KG/M2 | DIASTOLIC BLOOD PRESSURE: 68 MMHG | SYSTOLIC BLOOD PRESSURE: 126 MMHG | WEIGHT: 177 LBS | HEART RATE: 71 BPM

## 2021-07-15 DIAGNOSIS — Z86.010 HISTORY OF COLON POLYPS: Primary | ICD-10-CM

## 2021-07-15 PROCEDURE — 99203 PR OFFICE/OUTPT VISIT, NEW, LEVL III, 30-44 MIN: ICD-10-PCS | Mod: S$PBB,,, | Performed by: SURGERY

## 2021-07-15 PROCEDURE — 99999 PR PBB SHADOW E&M-EST. PATIENT-LVL III: CPT | Mod: PBBFAC,,, | Performed by: SURGERY

## 2021-07-15 PROCEDURE — 99203 OFFICE O/P NEW LOW 30 MIN: CPT | Mod: S$PBB,,, | Performed by: SURGERY

## 2021-07-15 PROCEDURE — 99999 PR PBB SHADOW E&M-EST. PATIENT-LVL III: ICD-10-PCS | Mod: PBBFAC,,, | Performed by: SURGERY

## 2021-07-15 PROCEDURE — 99213 OFFICE O/P EST LOW 20 MIN: CPT | Mod: PBBFAC,PO | Performed by: SURGERY

## 2021-07-16 ENCOUNTER — TELEPHONE (OUTPATIENT)
Dept: SURGERY | Facility: CLINIC | Age: 61
End: 2021-07-16

## 2021-07-20 ENCOUNTER — LAB VISIT (OUTPATIENT)
Dept: LAB | Facility: HOSPITAL | Age: 61
End: 2021-07-20
Attending: INTERNAL MEDICINE
Payer: OTHER GOVERNMENT

## 2021-07-20 DIAGNOSIS — Z79.899 HIGH RISK MEDICATIONS (NOT ANTICOAGULANTS) LONG-TERM USE: ICD-10-CM

## 2021-07-20 DIAGNOSIS — L40.50 PSORIATIC ARTHRITIS: ICD-10-CM

## 2021-07-20 DIAGNOSIS — E11.9 TYPE 2 DIABETES MELLITUS WITHOUT COMPLICATION, WITHOUT LONG-TERM CURRENT USE OF INSULIN: ICD-10-CM

## 2021-07-20 LAB
BASOPHILS # BLD AUTO: 0.05 K/UL (ref 0–0.2)
BASOPHILS NFR BLD: 0.7 % (ref 0–1.9)
DIFFERENTIAL METHOD: ABNORMAL
EOSINOPHIL # BLD AUTO: 0.1 K/UL (ref 0–0.5)
EOSINOPHIL NFR BLD: 1.6 % (ref 0–8)
ERYTHROCYTE [DISTWIDTH] IN BLOOD BY AUTOMATED COUNT: 13.5 % (ref 11.5–14.5)
ERYTHROCYTE [SEDIMENTATION RATE] IN BLOOD BY WESTERGREN METHOD: 10 MM/HR (ref 0–20)
ESTIMATED AVG GLUCOSE: 128 MG/DL (ref 68–131)
HBA1C MFR BLD: 6.1 % (ref 4–5.6)
HCT VFR BLD AUTO: 42 % (ref 37–48.5)
HGB BLD-MCNC: 13.3 G/DL (ref 12–16)
IMM GRANULOCYTES # BLD AUTO: 0.01 K/UL (ref 0–0.04)
IMM GRANULOCYTES NFR BLD AUTO: 0.1 % (ref 0–0.5)
LYMPHOCYTES # BLD AUTO: 2 K/UL (ref 1–4.8)
LYMPHOCYTES NFR BLD: 29.1 % (ref 18–48)
MCH RBC QN AUTO: 28.2 PG (ref 27–31)
MCHC RBC AUTO-ENTMCNC: 31.7 G/DL (ref 32–36)
MCV RBC AUTO: 89 FL (ref 82–98)
MONOCYTES # BLD AUTO: 0.7 K/UL (ref 0.3–1)
MONOCYTES NFR BLD: 10.3 % (ref 4–15)
NEUTROPHILS # BLD AUTO: 4.1 K/UL (ref 1.8–7.7)
NEUTROPHILS NFR BLD: 58.2 % (ref 38–73)
NRBC BLD-RTO: 0 /100 WBC
PLATELET # BLD AUTO: 243 K/UL (ref 150–450)
PMV BLD AUTO: 11.5 FL (ref 9.2–12.9)
RBC # BLD AUTO: 4.72 M/UL (ref 4–5.4)
WBC # BLD AUTO: 6.98 K/UL (ref 3.9–12.7)

## 2021-07-20 PROCEDURE — 36415 COLL VENOUS BLD VENIPUNCTURE: CPT | Mod: PO | Performed by: INTERNAL MEDICINE

## 2021-07-20 PROCEDURE — 86140 C-REACTIVE PROTEIN: CPT | Performed by: INTERNAL MEDICINE

## 2021-07-20 PROCEDURE — 80053 COMPREHEN METABOLIC PANEL: CPT | Performed by: INTERNAL MEDICINE

## 2021-07-20 PROCEDURE — 85025 COMPLETE CBC W/AUTO DIFF WBC: CPT | Performed by: INTERNAL MEDICINE

## 2021-07-20 PROCEDURE — 85651 RBC SED RATE NONAUTOMATED: CPT | Mod: PO | Performed by: INTERNAL MEDICINE

## 2021-07-20 PROCEDURE — 83036 HEMOGLOBIN GLYCOSYLATED A1C: CPT | Performed by: INTERNAL MEDICINE

## 2021-07-20 PROCEDURE — 80061 LIPID PANEL: CPT | Performed by: INTERNAL MEDICINE

## 2021-07-21 ENCOUNTER — PATIENT MESSAGE (OUTPATIENT)
Dept: SURGERY | Facility: CLINIC | Age: 61
End: 2021-07-21

## 2021-07-21 DIAGNOSIS — Z12.11 SCREEN FOR COLON CANCER: Primary | ICD-10-CM

## 2021-07-21 LAB
ALBUMIN SERPL BCP-MCNC: 4 G/DL (ref 3.5–5.2)
ALP SERPL-CCNC: 102 U/L (ref 55–135)
ALT SERPL W/O P-5'-P-CCNC: 22 U/L (ref 10–44)
ANION GAP SERPL CALC-SCNC: 9 MMOL/L (ref 8–16)
AST SERPL-CCNC: 19 U/L (ref 10–40)
BILIRUB SERPL-MCNC: 0.3 MG/DL (ref 0.1–1)
BUN SERPL-MCNC: 17 MG/DL (ref 8–23)
CALCIUM SERPL-MCNC: 10.3 MG/DL (ref 8.7–10.5)
CHLORIDE SERPL-SCNC: 105 MMOL/L (ref 95–110)
CHOLEST SERPL-MCNC: 154 MG/DL (ref 120–199)
CHOLEST/HDLC SERPL: 3 {RATIO} (ref 2–5)
CO2 SERPL-SCNC: 28 MMOL/L (ref 23–29)
CREAT SERPL-MCNC: 0.9 MG/DL (ref 0.5–1.4)
CRP SERPL-MCNC: 5.2 MG/L (ref 0–8.2)
EST. GFR  (AFRICAN AMERICAN): >60 ML/MIN/1.73 M^2
EST. GFR  (NON AFRICAN AMERICAN): >60 ML/MIN/1.73 M^2
GLUCOSE SERPL-MCNC: 104 MG/DL (ref 70–110)
HDLC SERPL-MCNC: 51 MG/DL (ref 40–75)
HDLC SERPL: 33.1 % (ref 20–50)
LDLC SERPL CALC-MCNC: 72 MG/DL (ref 63–159)
NONHDLC SERPL-MCNC: 103 MG/DL
POTASSIUM SERPL-SCNC: 3.9 MMOL/L (ref 3.5–5.1)
PROT SERPL-MCNC: 7.3 G/DL (ref 6–8.4)
SODIUM SERPL-SCNC: 142 MMOL/L (ref 136–145)
TRIGL SERPL-MCNC: 155 MG/DL (ref 30–150)

## 2021-07-21 RX ORDER — SODIUM CHLORIDE, SODIUM LACTATE, POTASSIUM CHLORIDE, CALCIUM CHLORIDE 600; 310; 30; 20 MG/100ML; MG/100ML; MG/100ML; MG/100ML
INJECTION, SOLUTION INTRAVENOUS CONTINUOUS
Status: CANCELLED | OUTPATIENT
Start: 2021-07-21

## 2021-07-21 RX ORDER — SODIUM CHLORIDE 0.9 % (FLUSH) 0.9 %
10 SYRINGE (ML) INJECTION
Status: CANCELLED | OUTPATIENT
Start: 2021-07-21

## 2021-07-25 ENCOUNTER — PATIENT MESSAGE (OUTPATIENT)
Dept: FAMILY MEDICINE | Facility: CLINIC | Age: 61
End: 2021-07-25

## 2021-07-26 ENCOUNTER — PATIENT MESSAGE (OUTPATIENT)
Dept: SURGERY | Facility: CLINIC | Age: 61
End: 2021-07-26

## 2021-08-04 ENCOUNTER — TELEPHONE (OUTPATIENT)
Dept: GASTROENTEROLOGY | Facility: CLINIC | Age: 61
End: 2021-08-04

## 2021-08-04 DIAGNOSIS — Z20.822 ENCOUNTER FOR LABORATORY TESTING FOR COVID-19 VIRUS: ICD-10-CM

## 2021-08-07 ENCOUNTER — LAB VISIT (OUTPATIENT)
Dept: FAMILY MEDICINE | Facility: CLINIC | Age: 61
End: 2021-08-07
Payer: OTHER GOVERNMENT

## 2021-08-07 DIAGNOSIS — Z20.822 ENCOUNTER FOR LABORATORY TESTING FOR COVID-19 VIRUS: ICD-10-CM

## 2021-08-07 PROCEDURE — U0003 INFECTIOUS AGENT DETECTION BY NUCLEIC ACID (DNA OR RNA); SEVERE ACUTE RESPIRATORY SYNDROME CORONAVIRUS 2 (SARS-COV-2) (CORONAVIRUS DISEASE [COVID-19]), AMPLIFIED PROBE TECHNIQUE, MAKING USE OF HIGH THROUGHPUT TECHNOLOGIES AS DESCRIBED BY CMS-2020-01-R: HCPCS | Performed by: SURGERY

## 2021-08-07 PROCEDURE — U0005 INFEC AGEN DETEC AMPLI PROBE: HCPCS | Performed by: SURGERY

## 2021-08-09 LAB
SARS-COV-2 RNA RESP QL NAA+PROBE: NOT DETECTED
SARS-COV-2- CYCLE NUMBER: -1

## 2021-08-10 ENCOUNTER — HOSPITAL ENCOUNTER (OUTPATIENT)
Facility: HOSPITAL | Age: 61
Discharge: HOME | End: 2021-08-10
Attending: SURGERY | Admitting: SURGERY
Payer: OTHER GOVERNMENT

## 2021-08-10 ENCOUNTER — ANESTHESIA EVENT (OUTPATIENT)
Dept: ENDOSCOPY | Facility: HOSPITAL | Age: 61
End: 2021-08-10
Payer: OTHER GOVERNMENT

## 2021-08-10 ENCOUNTER — ANESTHESIA (OUTPATIENT)
Dept: ENDOSCOPY | Facility: HOSPITAL | Age: 61
End: 2021-08-10
Payer: OTHER GOVERNMENT

## 2021-08-10 DIAGNOSIS — Z12.11 SCREEN FOR COLON CANCER: ICD-10-CM

## 2021-08-10 LAB — GLUCOSE SERPL-MCNC: 134 MG/DL (ref 70–110)

## 2021-08-10 PROCEDURE — D9220A PRA ANESTHESIA: ICD-10-PCS | Mod: 33,CRNA,, | Performed by: NURSE ANESTHETIST, CERTIFIED REGISTERED

## 2021-08-10 PROCEDURE — D9220A PRA ANESTHESIA: Mod: 33,ANES,, | Performed by: ANESTHESIOLOGY

## 2021-08-10 PROCEDURE — D9220A PRA ANESTHESIA: ICD-10-PCS | Mod: 33,ANES,, | Performed by: ANESTHESIOLOGY

## 2021-08-10 PROCEDURE — 45398 COLONOSCOPY W/BAND LIGATION: CPT | Mod: PO | Performed by: SURGERY

## 2021-08-10 PROCEDURE — 25000003 PHARM REV CODE 250: Mod: PO | Performed by: NURSE ANESTHETIST, CERTIFIED REGISTERED

## 2021-08-10 PROCEDURE — 45398 COLONOSCOPY W/BAND LIGATION: CPT | Mod: 33,,, | Performed by: SURGERY

## 2021-08-10 PROCEDURE — 27201022: Mod: PO | Performed by: SURGERY

## 2021-08-10 PROCEDURE — 37000008 HC ANESTHESIA 1ST 15 MINUTES: Mod: PO | Performed by: SURGERY

## 2021-08-10 PROCEDURE — 45398: ICD-10-PCS | Mod: 33,,, | Performed by: SURGERY

## 2021-08-10 PROCEDURE — 63600175 PHARM REV CODE 636 W HCPCS: Mod: PO | Performed by: SURGERY

## 2021-08-10 PROCEDURE — 00811 ANES LWR INTST NDSC NOS: CPT | Mod: PO | Performed by: SURGERY

## 2021-08-10 PROCEDURE — 63600175 PHARM REV CODE 636 W HCPCS: Mod: PO | Performed by: NURSE ANESTHETIST, CERTIFIED REGISTERED

## 2021-08-10 PROCEDURE — 37000009 HC ANESTHESIA EA ADD 15 MINS: Mod: PO | Performed by: SURGERY

## 2021-08-10 PROCEDURE — D9220A PRA ANESTHESIA: Mod: 33,CRNA,, | Performed by: NURSE ANESTHETIST, CERTIFIED REGISTERED

## 2021-08-10 RX ORDER — SODIUM CHLORIDE, SODIUM LACTATE, POTASSIUM CHLORIDE, CALCIUM CHLORIDE 600; 310; 30; 20 MG/100ML; MG/100ML; MG/100ML; MG/100ML
INJECTION, SOLUTION INTRAVENOUS CONTINUOUS
Status: DISCONTINUED | OUTPATIENT
Start: 2021-08-10 | End: 2021-08-10 | Stop reason: HOSPADM

## 2021-08-10 RX ORDER — SODIUM CHLORIDE 0.9 % (FLUSH) 0.9 %
10 SYRINGE (ML) INJECTION
Status: DISCONTINUED | OUTPATIENT
Start: 2021-08-10 | End: 2021-08-10 | Stop reason: HOSPADM

## 2021-08-10 RX ORDER — LIDOCAINE HCL/PF 100 MG/5ML
SYRINGE (ML) INTRAVENOUS
Status: DISCONTINUED | OUTPATIENT
Start: 2021-08-10 | End: 2021-08-10

## 2021-08-10 RX ORDER — PROPOFOL 10 MG/ML
VIAL (ML) INTRAVENOUS
Status: DISCONTINUED | OUTPATIENT
Start: 2021-08-10 | End: 2021-08-10

## 2021-08-10 RX ADMIN — PROPOFOL 50 MG: 10 INJECTION, EMULSION INTRAVENOUS at 07:08

## 2021-08-10 RX ADMIN — LIDOCAINE HYDROCHLORIDE 100 MG: 20 INJECTION, SOLUTION INTRAVENOUS at 07:08

## 2021-08-10 RX ADMIN — PROPOFOL 150 MG: 10 INJECTION, EMULSION INTRAVENOUS at 07:08

## 2021-08-10 RX ADMIN — SODIUM CHLORIDE, SODIUM LACTATE, POTASSIUM CHLORIDE, AND CALCIUM CHLORIDE: .6; .31; .03; .02 INJECTION, SOLUTION INTRAVENOUS at 06:08

## 2021-08-11 VITALS
TEMPERATURE: 98 F | RESPIRATION RATE: 18 BRPM | OXYGEN SATURATION: 98 % | SYSTOLIC BLOOD PRESSURE: 140 MMHG | HEIGHT: 65 IN | HEART RATE: 66 BPM | BODY MASS INDEX: 28.32 KG/M2 | WEIGHT: 170 LBS | DIASTOLIC BLOOD PRESSURE: 70 MMHG

## 2021-08-16 ENCOUNTER — PATIENT MESSAGE (OUTPATIENT)
Dept: RHEUMATOLOGY | Facility: CLINIC | Age: 61
End: 2021-08-16

## 2021-08-18 ENCOUNTER — PATIENT MESSAGE (OUTPATIENT)
Dept: RHEUMATOLOGY | Facility: CLINIC | Age: 61
End: 2021-08-18

## 2021-08-19 ENCOUNTER — OFFICE VISIT (OUTPATIENT)
Dept: RHEUMATOLOGY | Facility: CLINIC | Age: 61
End: 2021-08-19
Payer: OTHER GOVERNMENT

## 2021-08-19 ENCOUNTER — SPECIALTY PHARMACY (OUTPATIENT)
Dept: PHARMACY | Facility: CLINIC | Age: 61
End: 2021-08-19

## 2021-08-19 ENCOUNTER — LAB VISIT (OUTPATIENT)
Dept: LAB | Facility: HOSPITAL | Age: 61
End: 2021-08-19
Attending: INTERNAL MEDICINE
Payer: OTHER GOVERNMENT

## 2021-08-19 VITALS — HEIGHT: 65 IN | WEIGHT: 170 LBS | BODY MASS INDEX: 28.32 KG/M2

## 2021-08-19 DIAGNOSIS — L40.50 PSORIATIC ARTHRITIS: Primary | ICD-10-CM

## 2021-08-19 DIAGNOSIS — L40.50 PSORIATIC ARTHRITIS: ICD-10-CM

## 2021-08-19 DIAGNOSIS — E11.9 TYPE 2 DIABETES MELLITUS WITHOUT COMPLICATION, WITHOUT LONG-TERM CURRENT USE OF INSULIN: ICD-10-CM

## 2021-08-19 DIAGNOSIS — D84.9 IMMUNOSUPPRESSION: ICD-10-CM

## 2021-08-19 DIAGNOSIS — F32.A DEPRESSION, UNSPECIFIED DEPRESSION TYPE: ICD-10-CM

## 2021-08-19 DIAGNOSIS — R19.7 DIARRHEA, UNSPECIFIED TYPE: ICD-10-CM

## 2021-08-19 DIAGNOSIS — Z79.899 HIGH RISK MEDICATIONS (NOT ANTICOAGULANTS) LONG-TERM USE: ICD-10-CM

## 2021-08-19 PROCEDURE — 99214 OFFICE O/P EST MOD 30 MIN: CPT | Mod: 95,,, | Performed by: INTERNAL MEDICINE

## 2021-08-19 PROCEDURE — 86480 TB TEST CELL IMMUN MEASURE: CPT | Performed by: INTERNAL MEDICINE

## 2021-08-19 PROCEDURE — 36415 COLL VENOUS BLD VENIPUNCTURE: CPT | Mod: PO | Performed by: INTERNAL MEDICINE

## 2021-08-19 PROCEDURE — 99214 PR OFFICE/OUTPT VISIT, EST, LEVL IV, 30-39 MIN: ICD-10-PCS | Mod: 95,,, | Performed by: INTERNAL MEDICINE

## 2021-08-19 RX ORDER — SECUKINUMAB 150 MG/ML
150 INJECTION SUBCUTANEOUS
Qty: 1 SYRINGE | Refills: 11 | Status: SHIPPED | OUTPATIENT
Start: 2021-08-19 | End: 2021-10-13

## 2021-08-20 ENCOUNTER — IMMUNIZATION (OUTPATIENT)
Dept: FAMILY MEDICINE | Facility: CLINIC | Age: 61
End: 2021-08-20
Payer: OTHER GOVERNMENT

## 2021-08-20 DIAGNOSIS — Z23 NEED FOR VACCINATION: Primary | ICD-10-CM

## 2021-08-20 PROCEDURE — 0003A COVID-19, MRNA, LNP-S, PF, 30 MCG/0.3 ML DOSE VACCINE: CPT | Mod: CV19,,, | Performed by: FAMILY MEDICINE

## 2021-08-20 PROCEDURE — 91300 COVID-19, MRNA, LNP-S, PF, 30 MCG/0.3 ML DOSE VACCINE: ICD-10-PCS | Mod: ,,, | Performed by: FAMILY MEDICINE

## 2021-08-20 PROCEDURE — 91300 COVID-19, MRNA, LNP-S, PF, 30 MCG/0.3 ML DOSE VACCINE: CPT | Mod: ,,, | Performed by: FAMILY MEDICINE

## 2021-08-20 PROCEDURE — 0003A COVID-19, MRNA, LNP-S, PF, 30 MCG/0.3 ML DOSE VACCINE: ICD-10-PCS | Mod: CV19,,, | Performed by: FAMILY MEDICINE

## 2021-08-23 LAB — MAYO MISCELLANEOUS RESULT (REF): NORMAL

## 2021-08-24 ENCOUNTER — PATIENT MESSAGE (OUTPATIENT)
Dept: PHARMACY | Facility: CLINIC | Age: 61
End: 2021-08-24

## 2021-08-26 ENCOUNTER — TELEPHONE (OUTPATIENT)
Dept: RHEUMATOLOGY | Facility: CLINIC | Age: 61
End: 2021-08-26

## 2021-08-26 DIAGNOSIS — R53.83 MALAISE AND FATIGUE: ICD-10-CM

## 2021-08-26 DIAGNOSIS — D84.9 IMMUNOSUPPRESSION: Primary | ICD-10-CM

## 2021-08-26 DIAGNOSIS — R53.81 MALAISE AND FATIGUE: ICD-10-CM

## 2021-08-26 NOTE — TELEPHONE ENCOUNTER
----- Message from Supriya More PharmD sent at 8/19/2021  9:51 AM CDT -----  Regarding: TB in last 12 months  Good morning,    Patient's plan is requiring documentation of negative TB within the last 12 months in order to approve Cosentyx. Last TB on file 3/23/2020. Please schedule test accordingly if possible. Thank you for your time.    Supriya More, Hanna  Clinical Pharmacist - Rheum/Derm/GI [facility]  Ph: [phone]

## 2021-09-09 ENCOUNTER — LAB VISIT (OUTPATIENT)
Dept: PRIMARY CARE CLINIC | Facility: OTHER | Age: 61
End: 2021-09-09
Payer: OTHER GOVERNMENT

## 2021-09-09 DIAGNOSIS — Z20.822 ENCOUNTER FOR LABORATORY TESTING FOR COVID-19 VIRUS: ICD-10-CM

## 2021-09-09 PROCEDURE — U0003 INFECTIOUS AGENT DETECTION BY NUCLEIC ACID (DNA OR RNA); SEVERE ACUTE RESPIRATORY SYNDROME CORONAVIRUS 2 (SARS-COV-2) (CORONAVIRUS DISEASE [COVID-19]), AMPLIFIED PROBE TECHNIQUE, MAKING USE OF HIGH THROUGHPUT TECHNOLOGIES AS DESCRIBED BY CMS-2020-01-R: HCPCS | Performed by: FAMILY MEDICINE

## 2021-09-10 ENCOUNTER — PATIENT MESSAGE (OUTPATIENT)
Dept: FAMILY MEDICINE | Facility: CLINIC | Age: 61
End: 2021-09-10

## 2021-09-10 LAB
SARS-COV-2 RNA RESP QL NAA+PROBE: NOT DETECTED
SARS-COV-2- CYCLE NUMBER: NORMAL

## 2021-09-15 DIAGNOSIS — Z12.31 OTHER SCREENING MAMMOGRAM: ICD-10-CM

## 2021-10-07 ENCOUNTER — PATIENT MESSAGE (OUTPATIENT)
Dept: RHEUMATOLOGY | Facility: CLINIC | Age: 61
End: 2021-10-07

## 2021-10-08 ENCOUNTER — PATIENT MESSAGE (OUTPATIENT)
Dept: RHEUMATOLOGY | Facility: CLINIC | Age: 61
End: 2021-10-08

## 2021-10-12 ENCOUNTER — OFFICE VISIT (OUTPATIENT)
Dept: FAMILY MEDICINE | Facility: CLINIC | Age: 61
End: 2021-10-12
Payer: OTHER GOVERNMENT

## 2021-10-12 VITALS
HEART RATE: 73 BPM | HEIGHT: 65 IN | BODY MASS INDEX: 29.27 KG/M2 | OXYGEN SATURATION: 98 % | SYSTOLIC BLOOD PRESSURE: 130 MMHG | DIASTOLIC BLOOD PRESSURE: 78 MMHG | WEIGHT: 175.69 LBS

## 2021-10-12 DIAGNOSIS — J06.9 VIRAL URI WITH COUGH: Primary | ICD-10-CM

## 2021-10-12 DIAGNOSIS — Z23 NEED FOR VACCINATION: ICD-10-CM

## 2021-10-12 PROCEDURE — 99213 OFFICE O/P EST LOW 20 MIN: CPT | Mod: PBBFAC,PO,25 | Performed by: INTERNAL MEDICINE

## 2021-10-12 PROCEDURE — 99999 PR PBB SHADOW E&M-EST. PATIENT-LVL III: ICD-10-PCS | Mod: PBBFAC,,, | Performed by: INTERNAL MEDICINE

## 2021-10-12 PROCEDURE — 99213 PR OFFICE/OUTPT VISIT, EST, LEVL III, 20-29 MIN: ICD-10-PCS | Mod: S$PBB,,, | Performed by: INTERNAL MEDICINE

## 2021-10-12 PROCEDURE — 99999 PR PBB SHADOW E&M-EST. PATIENT-LVL III: CPT | Mod: PBBFAC,,, | Performed by: INTERNAL MEDICINE

## 2021-10-12 PROCEDURE — 99213 OFFICE O/P EST LOW 20 MIN: CPT | Mod: S$PBB,,, | Performed by: INTERNAL MEDICINE

## 2021-10-12 PROCEDURE — 90471 IMMUNIZATION ADMIN: CPT | Mod: PBBFAC,PO

## 2021-11-22 ENCOUNTER — PATIENT MESSAGE (OUTPATIENT)
Dept: DERMATOLOGY | Facility: CLINIC | Age: 61
End: 2021-11-22
Payer: OTHER GOVERNMENT

## 2021-12-06 ENCOUNTER — PATIENT MESSAGE (OUTPATIENT)
Dept: ADMINISTRATIVE | Facility: OTHER | Age: 61
End: 2021-12-06
Payer: OTHER GOVERNMENT

## 2021-12-06 ENCOUNTER — PATIENT OUTREACH (OUTPATIENT)
Dept: ADMINISTRATIVE | Facility: OTHER | Age: 61
End: 2021-12-06
Payer: OTHER GOVERNMENT

## 2021-12-08 ENCOUNTER — OFFICE VISIT (OUTPATIENT)
Dept: DERMATOLOGY | Facility: CLINIC | Age: 61
End: 2021-12-08
Payer: OTHER GOVERNMENT

## 2021-12-08 VITALS — RESPIRATION RATE: 18 BRPM | WEIGHT: 175.69 LBS | HEIGHT: 65 IN | BODY MASS INDEX: 29.27 KG/M2

## 2021-12-08 DIAGNOSIS — L73.9 FOLLICULITIS: Primary | ICD-10-CM

## 2021-12-08 DIAGNOSIS — L70.0 ACNE VULGARIS: ICD-10-CM

## 2021-12-08 PROCEDURE — 99214 PR OFFICE/OUTPT VISIT, EST, LEVL IV, 30-39 MIN: ICD-10-PCS | Mod: S$PBB,,, | Performed by: DERMATOLOGY

## 2021-12-08 PROCEDURE — 99213 OFFICE O/P EST LOW 20 MIN: CPT | Mod: PBBFAC,PO | Performed by: DERMATOLOGY

## 2021-12-08 PROCEDURE — 87070 CULTURE OTHR SPECIMN AEROBIC: CPT | Performed by: DERMATOLOGY

## 2021-12-08 PROCEDURE — 99999 PR PBB SHADOW E&M-EST. PATIENT-LVL III: ICD-10-PCS | Mod: PBBFAC,,, | Performed by: DERMATOLOGY

## 2021-12-08 PROCEDURE — 99999 PR PBB SHADOW E&M-EST. PATIENT-LVL III: CPT | Mod: PBBFAC,,, | Performed by: DERMATOLOGY

## 2021-12-08 PROCEDURE — 99214 OFFICE O/P EST MOD 30 MIN: CPT | Mod: S$PBB,,, | Performed by: DERMATOLOGY

## 2021-12-08 RX ORDER — SULFACETAMIDE SODIUM AND SULFUR 100; 20 MG/G; MG/G
CREAM TOPICAL
Qty: 30 G | Refills: 5 | Status: SHIPPED | OUTPATIENT
Start: 2021-12-08 | End: 2022-01-13

## 2021-12-08 RX ORDER — FLUCONAZOLE 200 MG/1
TABLET ORAL
Qty: 7 TABLET | Refills: 0 | Status: SHIPPED | OUTPATIENT
Start: 2021-12-08 | End: 2022-03-25

## 2021-12-08 RX ORDER — TRETINOIN 0.5 MG/G
CREAM TOPICAL
Qty: 45 G | Refills: 5 | Status: SHIPPED | OUTPATIENT
Start: 2021-12-08 | End: 2022-03-25

## 2021-12-09 ENCOUNTER — PATIENT MESSAGE (OUTPATIENT)
Dept: DERMATOLOGY | Facility: CLINIC | Age: 61
End: 2021-12-09
Payer: OTHER GOVERNMENT

## 2021-12-09 ENCOUNTER — TELEPHONE (OUTPATIENT)
Dept: DERMATOLOGY | Facility: CLINIC | Age: 61
End: 2021-12-09
Payer: OTHER GOVERNMENT

## 2021-12-11 ENCOUNTER — PATIENT MESSAGE (OUTPATIENT)
Dept: DERMATOLOGY | Facility: CLINIC | Age: 61
End: 2021-12-11
Payer: OTHER GOVERNMENT

## 2021-12-11 LAB — BACTERIA SPEC AEROBE CULT: NORMAL

## 2021-12-12 ENCOUNTER — PATIENT MESSAGE (OUTPATIENT)
Dept: DERMATOLOGY | Facility: CLINIC | Age: 61
End: 2021-12-12
Payer: OTHER GOVERNMENT

## 2021-12-13 ENCOUNTER — LAB VISIT (OUTPATIENT)
Dept: LAB | Facility: HOSPITAL | Age: 61
End: 2021-12-13
Attending: INTERNAL MEDICINE
Payer: OTHER GOVERNMENT

## 2021-12-13 DIAGNOSIS — R53.81 MALAISE AND FATIGUE: ICD-10-CM

## 2021-12-13 DIAGNOSIS — R53.83 MALAISE AND FATIGUE: ICD-10-CM

## 2021-12-13 DIAGNOSIS — D84.9 IMMUNOSUPPRESSION: ICD-10-CM

## 2021-12-13 DIAGNOSIS — L40.50 PSORIATIC ARTHRITIS: ICD-10-CM

## 2021-12-13 LAB
ALBUMIN SERPL BCP-MCNC: 4.1 G/DL (ref 3.5–5.2)
ALP SERPL-CCNC: 97 U/L (ref 55–135)
ALT SERPL W/O P-5'-P-CCNC: 21 U/L (ref 10–44)
ANION GAP SERPL CALC-SCNC: 9 MMOL/L (ref 8–16)
AST SERPL-CCNC: 19 U/L (ref 10–40)
BASOPHILS # BLD AUTO: 0.05 K/UL (ref 0–0.2)
BASOPHILS NFR BLD: 0.9 % (ref 0–1.9)
BILIRUB SERPL-MCNC: 0.3 MG/DL (ref 0.1–1)
BUN SERPL-MCNC: 14 MG/DL (ref 8–23)
CALCIUM SERPL-MCNC: 10 MG/DL (ref 8.7–10.5)
CHLORIDE SERPL-SCNC: 105 MMOL/L (ref 95–110)
CO2 SERPL-SCNC: 24 MMOL/L (ref 23–29)
CREAT SERPL-MCNC: 1.1 MG/DL (ref 0.5–1.4)
CRP SERPL-MCNC: 11 MG/L (ref 0–8.2)
DIFFERENTIAL METHOD: ABNORMAL
EOSINOPHIL # BLD AUTO: 0.1 K/UL (ref 0–0.5)
EOSINOPHIL NFR BLD: 2.1 % (ref 0–8)
ERYTHROCYTE [DISTWIDTH] IN BLOOD BY AUTOMATED COUNT: 13.2 % (ref 11.5–14.5)
ERYTHROCYTE [SEDIMENTATION RATE] IN BLOOD BY WESTERGREN METHOD: 18 MM/HR (ref 0–20)
EST. GFR  (AFRICAN AMERICAN): >60 ML/MIN/1.73 M^2
EST. GFR  (NON AFRICAN AMERICAN): 53.9 ML/MIN/1.73 M^2
GLUCOSE SERPL-MCNC: 118 MG/DL (ref 70–110)
HCT VFR BLD AUTO: 41.4 % (ref 37–48.5)
HGB BLD-MCNC: 13 G/DL (ref 12–16)
IMM GRANULOCYTES # BLD AUTO: 0 K/UL (ref 0–0.04)
IMM GRANULOCYTES NFR BLD AUTO: 0 % (ref 0–0.5)
LYMPHOCYTES # BLD AUTO: 1.7 K/UL (ref 1–4.8)
LYMPHOCYTES NFR BLD: 32.8 % (ref 18–48)
MCH RBC QN AUTO: 27.7 PG (ref 27–31)
MCHC RBC AUTO-ENTMCNC: 31.4 G/DL (ref 32–36)
MCV RBC AUTO: 88 FL (ref 82–98)
MONOCYTES # BLD AUTO: 0.5 K/UL (ref 0.3–1)
MONOCYTES NFR BLD: 9.3 % (ref 4–15)
NEUTROPHILS # BLD AUTO: 2.9 K/UL (ref 1.8–7.7)
NEUTROPHILS NFR BLD: 54.9 % (ref 38–73)
NRBC BLD-RTO: 0 /100 WBC
PLATELET # BLD AUTO: 227 K/UL (ref 150–450)
PMV BLD AUTO: 12.1 FL (ref 9.2–12.9)
POTASSIUM SERPL-SCNC: 4.4 MMOL/L (ref 3.5–5.1)
PROT SERPL-MCNC: 7.4 G/DL (ref 6–8.4)
RBC # BLD AUTO: 4.7 M/UL (ref 4–5.4)
SODIUM SERPL-SCNC: 138 MMOL/L (ref 136–145)
WBC # BLD AUTO: 5.28 K/UL (ref 3.9–12.7)

## 2021-12-13 PROCEDURE — 86140 C-REACTIVE PROTEIN: CPT | Performed by: INTERNAL MEDICINE

## 2021-12-13 PROCEDURE — 85025 COMPLETE CBC W/AUTO DIFF WBC: CPT | Performed by: INTERNAL MEDICINE

## 2021-12-13 PROCEDURE — 80053 COMPREHEN METABOLIC PANEL: CPT | Performed by: INTERNAL MEDICINE

## 2021-12-13 PROCEDURE — 85651 RBC SED RATE NONAUTOMATED: CPT | Mod: PO | Performed by: INTERNAL MEDICINE

## 2021-12-13 PROCEDURE — 36415 COLL VENOUS BLD VENIPUNCTURE: CPT | Mod: PO | Performed by: INTERNAL MEDICINE

## 2021-12-13 PROCEDURE — 80074 ACUTE HEPATITIS PANEL: CPT | Performed by: INTERNAL MEDICINE

## 2021-12-14 LAB
HAV IGM SERPL QL IA: NEGATIVE
HBV CORE IGM SERPL QL IA: NEGATIVE
HBV SURFACE AG SERPL QL IA: NEGATIVE
HCV AB SERPL QL IA: NEGATIVE

## 2021-12-21 ENCOUNTER — OFFICE VISIT (OUTPATIENT)
Dept: RHEUMATOLOGY | Facility: CLINIC | Age: 61
End: 2021-12-21
Payer: OTHER GOVERNMENT

## 2021-12-21 VITALS
DIASTOLIC BLOOD PRESSURE: 75 MMHG | HEART RATE: 64 BPM | HEIGHT: 65 IN | SYSTOLIC BLOOD PRESSURE: 130 MMHG | BODY MASS INDEX: 29.97 KG/M2 | WEIGHT: 179.88 LBS

## 2021-12-21 DIAGNOSIS — L40.9 PSORIASIS: ICD-10-CM

## 2021-12-21 DIAGNOSIS — L40.50 PSORIATIC ARTHRITIS: Primary | ICD-10-CM

## 2021-12-21 PROCEDURE — 99214 PR OFFICE/OUTPT VISIT, EST, LEVL IV, 30-39 MIN: ICD-10-PCS | Mod: S$PBB,,, | Performed by: INTERNAL MEDICINE

## 2021-12-21 PROCEDURE — 99999 PR PBB SHADOW E&M-EST. PATIENT-LVL IV: ICD-10-PCS | Mod: PBBFAC,,, | Performed by: INTERNAL MEDICINE

## 2021-12-21 PROCEDURE — 99214 OFFICE O/P EST MOD 30 MIN: CPT | Mod: PBBFAC,PN | Performed by: INTERNAL MEDICINE

## 2021-12-21 PROCEDURE — 99999 PR PBB SHADOW E&M-EST. PATIENT-LVL IV: CPT | Mod: PBBFAC,,, | Performed by: INTERNAL MEDICINE

## 2021-12-21 PROCEDURE — 99214 OFFICE O/P EST MOD 30 MIN: CPT | Mod: S$PBB,,, | Performed by: INTERNAL MEDICINE

## 2021-12-21 RX ORDER — PREDNISONE 10 MG/1
TABLET ORAL
Qty: 21 TABLET | Refills: 0 | Status: SHIPPED | OUTPATIENT
Start: 2021-12-21 | End: 2022-03-25

## 2021-12-21 ASSESSMENT — ROUTINE ASSESSMENT OF PATIENT INDEX DATA (RAPID3)
PSYCHOLOGICAL DISTRESS SCORE: 3.3
MDHAQ FUNCTION SCORE: 0.5
TOTAL RAPID3 SCORE: 3.56
PAIN SCORE: 5
PATIENT GLOBAL ASSESSMENT SCORE: 4
FATIGUE SCORE: 2.2

## 2022-01-03 ENCOUNTER — PATIENT MESSAGE (OUTPATIENT)
Dept: DERMATOLOGY | Facility: CLINIC | Age: 62
End: 2022-01-03
Payer: OTHER GOVERNMENT

## 2022-01-13 DIAGNOSIS — L70.0 ACNE VULGARIS: Primary | ICD-10-CM

## 2022-01-13 RX ORDER — SULFACETAMIDE SODIUM, SULFUR 100; 50 MG/G; MG/G
EMULSION TOPICAL
Qty: 170 G | Refills: 5 | Status: SHIPPED | OUTPATIENT
Start: 2022-01-13 | End: 2022-03-25

## 2022-02-09 ENCOUNTER — PATIENT MESSAGE (OUTPATIENT)
Dept: DERMATOLOGY | Facility: CLINIC | Age: 62
End: 2022-02-09
Payer: OTHER GOVERNMENT

## 2022-03-10 ENCOUNTER — PATIENT MESSAGE (OUTPATIENT)
Dept: RHEUMATOLOGY | Facility: CLINIC | Age: 62
End: 2022-03-10
Payer: OTHER GOVERNMENT

## 2022-03-24 ENCOUNTER — TELEPHONE (OUTPATIENT)
Dept: FAMILY MEDICINE | Facility: CLINIC | Age: 62
End: 2022-03-24
Payer: OTHER GOVERNMENT

## 2022-03-24 NOTE — TELEPHONE ENCOUNTER
Called pt regarding scheduling an apt, pt agreed and understood to upcoming apt.       ----- Message from Ingris Burton sent at 3/24/2022 11:31 AM CDT -----  Contact: Pt  Type: Needs Medical Advice  Who Called:  Pt  Symptoms (please be specific):  cough, runny nose, watery eyes  How long has patient had these symptoms:  3 days  Pharmacy name and phone #:    [id number] - [place] [id number] [place] [address] AT [facility] [address] [address]  Phone: [phone] Fax: [phone]    Best Call Back Number: [phone]  Additional Information:  Pt asking to please call something in today

## 2022-03-25 ENCOUNTER — OFFICE VISIT (OUTPATIENT)
Dept: FAMILY MEDICINE | Facility: CLINIC | Age: 62
End: 2022-03-25
Payer: OTHER GOVERNMENT

## 2022-03-25 VITALS
DIASTOLIC BLOOD PRESSURE: 72 MMHG | WEIGHT: 180.56 LBS | SYSTOLIC BLOOD PRESSURE: 126 MMHG | OXYGEN SATURATION: 98 % | TEMPERATURE: 98 F | HEIGHT: 65 IN | HEART RATE: 74 BPM | BODY MASS INDEX: 30.08 KG/M2

## 2022-03-25 DIAGNOSIS — R09.82 POSTNASAL DRIP: ICD-10-CM

## 2022-03-25 DIAGNOSIS — J30.1 SEASONAL ALLERGIC RHINITIS DUE TO POLLEN: Primary | ICD-10-CM

## 2022-03-25 PROCEDURE — 99999 PR PBB SHADOW E&M-EST. PATIENT-LVL III: CPT | Mod: PBBFAC,,, | Performed by: NURSE PRACTITIONER

## 2022-03-25 PROCEDURE — 99213 PR OFFICE/OUTPT VISIT, EST, LEVL III, 20-29 MIN: ICD-10-PCS | Mod: S$PBB,,, | Performed by: NURSE PRACTITIONER

## 2022-03-25 PROCEDURE — 99213 OFFICE O/P EST LOW 20 MIN: CPT | Mod: PBBFAC,PO | Performed by: NURSE PRACTITIONER

## 2022-03-25 PROCEDURE — 99999 PR PBB SHADOW E&M-EST. PATIENT-LVL III: ICD-10-PCS | Mod: PBBFAC,,, | Performed by: NURSE PRACTITIONER

## 2022-03-25 PROCEDURE — 99213 OFFICE O/P EST LOW 20 MIN: CPT | Mod: S$PBB,,, | Performed by: NURSE PRACTITIONER

## 2022-03-25 RX ORDER — ASCORBIC ACID 500 MG
500 TABLET ORAL DAILY
COMMUNITY

## 2022-03-25 RX ORDER — LORATADINE 10 MG/1
10 TABLET ORAL DAILY
Qty: 30 TABLET | Refills: 5 | Status: SHIPPED | OUTPATIENT
Start: 2022-03-25 | End: 2022-05-03

## 2022-03-25 NOTE — PROGRESS NOTES
Subjective:       Patient ID: Charla Patrick is a 61 y.o. female.    Chief Complaint: watery eyes (Eyes are watery, itchy, red, Lt ear painful ( pops when she blows her nose, and nose is runny. Used flonase and it helped. Started 3 days ago. Took at home covid test yesterday, it was Neg.)    HPI   Patient with history of seasonal allergies presents for itchy watery eyes, rhinorrhea, PND, nasal congestion and ear fullness. Started flonase at onset of symptoms which has helped. Not taking antihistamine currently. Denies discolored mucus production, fever, shortness of breath or wheezing. Home covid test negative yesterday     Vitals:    03/25/22 0947   BP: 126/72   Pulse: 74   Temp: 97.9 °F (36.6 °C)     Review of Systems   Constitutional: Negative for chills, diaphoresis and fever.   HENT: Positive for congestion, postnasal drip and rhinorrhea.    Eyes: Positive for discharge and itching.   Respiratory: Positive for cough. Negative for shortness of breath and wheezing.    Cardiovascular: Negative for chest pain.       Past Medical History:   Diagnosis Date    Allergic rhinitis     Diabetes mellitus, type 2     History of colon polyps     Hyperlipidemia     Psoriatic arthritis      Objective:      Physical Exam  Constitutional:       General: She is not in acute distress.     Appearance: She is well-developed. She is not ill-appearing, toxic-appearing or diaphoretic.   HENT:      Right Ear: Hearing, tympanic membrane, ear canal and external ear normal.      Left Ear: Hearing, ear canal and external ear normal. A middle ear effusion is present.      Nose: Congestion present.      Mouth/Throat:      Pharynx: Oropharynx is clear.   Pulmonary:      Effort: No tachypnea or respiratory distress.   Skin:     Coloration: Skin is not pale.   Neurological:      Mental Status: She is alert and oriented to person, place, and time.   Psychiatric:         Speech: Speech normal.         Behavior: Behavior normal.          Thought Content: Thought content normal.         Judgment: Judgment normal.         Assessment:       1. Seasonal allergic rhinitis due to pollen    2. Postnasal drip        Plan:       Seasonal allergic rhinitis due to pollen    Postnasal drip  -     loratadine (CLARITIN) 10 mg tablet; Take 1 tablet (10 mg total) by mouth once daily.  Dispense: 30 tablet; Refill: 5      Flonase, antihistamine  education provided on supportive care, symptom monitoring and return precautions           Follow up for further evaluation if s/s worsen, fail to improve, or new symptoms arise.    Medication List with Changes/Refills   Current Medications    ALPRAZOLAM (XANAX) 0.5 MG TABLET    Take 1 tablet (0.5 mg total) by mouth 2 (two) times daily as needed for Anxiety.    ASCORBIC ACID, VITAMIN C, (VITAMIN C) 500 MG TABLET    Take 500 mg by mouth once daily.    FAMOTIDINE (PEPCID) 40 MG TABLET    Take 40 mg by mouth once daily.    GLIMEPIRIDE (AMARYL) 1 MG TABLET    Take 1 tablet (1 mg total) by mouth before breakfast.    PANTOPRAZOLE (PROTONIX) 40 MG TABLET    Take 1 tablet (40 mg total) by mouth once daily.    SECUKINUMAB (COSENTYX PEN) 150 MG/ML PNIJ    Inject 150 mg into the skin every 30 days.    SIMVASTATIN (ZOCOR) 40 MG TABLET    TAKE 1 TABLET EVERY EVENING    VITAMIN B COMPLEX (SUPER B COMPLEX-B-12 ORAL)       Changed and/or Refilled Medications    Modified Medication Previous Medication    LORATADINE (CLARITIN) 10 MG TABLET loratadine (CLARITIN) 10 mg tablet       Take 1 tablet (10 mg total) by mouth once daily.    Take 1 tablet (10 mg total) by mouth once daily.   Discontinued Medications    CLINDAMYCIN (CLEOCIN T) 1 % EXTERNAL SOLUTION    aaa scalp and facial bumps bid prn    FLUCONAZOLE (DIFLUCAN) 200 MG TAB    1 po daily    KETOCONAZOLE (NIZORAL) 2 % SHAMPOO    Wash hair with medicated shampoo at least 2x/week - let sit on scalp at least 5 minutes prior to rinsing    LORATADINE (CLARITIN) 10 MG TABLET    Take 1 tablet (10  mg total) by mouth once daily. for 15 days    PREDNISONE (DELTASONE) 10 MG TABLET    Prednisone 20mg 3 days, 10mg x 10 days, 5mg x 10 days    SULFACETAMIDE SODIUM-SULFUR 10-5 % (W/W) CLSR    Use to wash face daily    TRETINOIN (RETIN-A) 0.05 % CREAM    Thin film to entire face at bedtime

## 2022-04-05 DIAGNOSIS — L40.50 PSORIATIC ARTHRITIS: ICD-10-CM

## 2022-04-12 RX ORDER — SECUKINUMAB 150 MG/ML
150 INJECTION SUBCUTANEOUS
Qty: 3 ML | Refills: 3 | Status: SHIPPED | OUTPATIENT
Start: 2022-04-12 | End: 2022-06-17

## 2022-04-21 ENCOUNTER — LAB VISIT (OUTPATIENT)
Dept: LAB | Facility: HOSPITAL | Age: 62
End: 2022-04-21
Attending: INTERNAL MEDICINE
Payer: OTHER GOVERNMENT

## 2022-04-21 DIAGNOSIS — D84.9 IMMUNOSUPPRESSION: ICD-10-CM

## 2022-04-21 DIAGNOSIS — L40.50 PSORIATIC ARTHRITIS: ICD-10-CM

## 2022-04-21 LAB
ALBUMIN SERPL BCP-MCNC: 4 G/DL (ref 3.5–5.2)
ALP SERPL-CCNC: 96 U/L (ref 55–135)
ALT SERPL W/O P-5'-P-CCNC: 24 U/L (ref 10–44)
ANION GAP SERPL CALC-SCNC: 11 MMOL/L (ref 8–16)
AST SERPL-CCNC: 21 U/L (ref 10–40)
BASOPHILS # BLD AUTO: 0.04 K/UL (ref 0–0.2)
BASOPHILS NFR BLD: 0.6 % (ref 0–1.9)
BILIRUB SERPL-MCNC: 0.4 MG/DL (ref 0.1–1)
BUN SERPL-MCNC: 16 MG/DL (ref 8–23)
CALCIUM SERPL-MCNC: 9.9 MG/DL (ref 8.7–10.5)
CHLORIDE SERPL-SCNC: 105 MMOL/L (ref 95–110)
CO2 SERPL-SCNC: 25 MMOL/L (ref 23–29)
CREAT SERPL-MCNC: 0.8 MG/DL (ref 0.5–1.4)
CRP SERPL-MCNC: 4.3 MG/L (ref 0–8.2)
DIFFERENTIAL METHOD: ABNORMAL
EOSINOPHIL # BLD AUTO: 0.1 K/UL (ref 0–0.5)
EOSINOPHIL NFR BLD: 1.9 % (ref 0–8)
ERYTHROCYTE [DISTWIDTH] IN BLOOD BY AUTOMATED COUNT: 12.7 % (ref 11.5–14.5)
ERYTHROCYTE [SEDIMENTATION RATE] IN BLOOD BY WESTERGREN METHOD: 8 MM/HR (ref 0–36)
EST. GFR  (AFRICAN AMERICAN): >60 ML/MIN/1.73 M^2
EST. GFR  (NON AFRICAN AMERICAN): >60 ML/MIN/1.73 M^2
GLUCOSE SERPL-MCNC: 122 MG/DL (ref 70–110)
HCT VFR BLD AUTO: 43.3 % (ref 37–48.5)
HGB BLD-MCNC: 13.5 G/DL (ref 12–16)
IMM GRANULOCYTES # BLD AUTO: 0.02 K/UL (ref 0–0.04)
IMM GRANULOCYTES NFR BLD AUTO: 0.3 % (ref 0–0.5)
LYMPHOCYTES # BLD AUTO: 1.9 K/UL (ref 1–4.8)
LYMPHOCYTES NFR BLD: 31.2 % (ref 18–48)
MCH RBC QN AUTO: 28 PG (ref 27–31)
MCHC RBC AUTO-ENTMCNC: 31.2 G/DL (ref 32–36)
MCV RBC AUTO: 90 FL (ref 82–98)
MONOCYTES # BLD AUTO: 0.6 K/UL (ref 0.3–1)
MONOCYTES NFR BLD: 9.3 % (ref 4–15)
NEUTROPHILS # BLD AUTO: 3.5 K/UL (ref 1.8–7.7)
NEUTROPHILS NFR BLD: 56.7 % (ref 38–73)
NRBC BLD-RTO: 0 /100 WBC
PLATELET # BLD AUTO: 239 K/UL (ref 150–450)
PMV BLD AUTO: 11.4 FL (ref 9.2–12.9)
POTASSIUM SERPL-SCNC: 4.6 MMOL/L (ref 3.5–5.1)
PROT SERPL-MCNC: 7.3 G/DL (ref 6–8.4)
RBC # BLD AUTO: 4.83 M/UL (ref 4–5.4)
SODIUM SERPL-SCNC: 141 MMOL/L (ref 136–145)
WBC # BLD AUTO: 6.21 K/UL (ref 3.9–12.7)

## 2022-04-21 PROCEDURE — 36415 COLL VENOUS BLD VENIPUNCTURE: CPT | Mod: PO | Performed by: INTERNAL MEDICINE

## 2022-04-21 PROCEDURE — 80053 COMPREHEN METABOLIC PANEL: CPT | Performed by: INTERNAL MEDICINE

## 2022-04-21 PROCEDURE — 85652 RBC SED RATE AUTOMATED: CPT | Performed by: INTERNAL MEDICINE

## 2022-04-21 PROCEDURE — 86140 C-REACTIVE PROTEIN: CPT | Performed by: INTERNAL MEDICINE

## 2022-04-21 PROCEDURE — 85025 COMPLETE CBC W/AUTO DIFF WBC: CPT | Performed by: INTERNAL MEDICINE

## 2022-04-27 ENCOUNTER — HOSPITAL ENCOUNTER (OUTPATIENT)
Dept: RADIOLOGY | Facility: HOSPITAL | Age: 62
Discharge: HOME | End: 2022-04-27
Attending: INTERNAL MEDICINE
Payer: OTHER GOVERNMENT

## 2022-04-27 ENCOUNTER — OFFICE VISIT (OUTPATIENT)
Dept: RHEUMATOLOGY | Facility: CLINIC | Age: 62
End: 2022-04-27
Payer: OTHER GOVERNMENT

## 2022-04-27 VITALS
HEART RATE: 71 BPM | HEIGHT: 65 IN | DIASTOLIC BLOOD PRESSURE: 78 MMHG | SYSTOLIC BLOOD PRESSURE: 121 MMHG | WEIGHT: 179 LBS | BODY MASS INDEX: 29.82 KG/M2

## 2022-04-27 DIAGNOSIS — L40.9 PSORIASIS: ICD-10-CM

## 2022-04-27 DIAGNOSIS — Z12.31 OTHER SCREENING MAMMOGRAM: ICD-10-CM

## 2022-04-27 DIAGNOSIS — B37.9 YEAST INFECTION: ICD-10-CM

## 2022-04-27 DIAGNOSIS — L40.50 PSORIATIC ARTHRITIS: Primary | ICD-10-CM

## 2022-04-27 PROCEDURE — 99214 OFFICE O/P EST MOD 30 MIN: CPT | Mod: PBBFAC,PN | Performed by: INTERNAL MEDICINE

## 2022-04-27 PROCEDURE — 99214 PR OFFICE/OUTPT VISIT, EST, LEVL IV, 30-39 MIN: ICD-10-PCS | Mod: S$PBB,,, | Performed by: INTERNAL MEDICINE

## 2022-04-27 PROCEDURE — 99999 PR PBB SHADOW E&M-EST. PATIENT-LVL IV: ICD-10-PCS | Mod: PBBFAC,,, | Performed by: INTERNAL MEDICINE

## 2022-04-27 PROCEDURE — 77063 BREAST TOMOSYNTHESIS BI: CPT | Mod: 26,,, | Performed by: RADIOLOGY

## 2022-04-27 PROCEDURE — 77067 SCR MAMMO BI INCL CAD: CPT | Mod: 26,,, | Performed by: RADIOLOGY

## 2022-04-27 PROCEDURE — 99999 PR PBB SHADOW E&M-EST. PATIENT-LVL IV: CPT | Mod: PBBFAC,,, | Performed by: INTERNAL MEDICINE

## 2022-04-27 PROCEDURE — 99214 OFFICE O/P EST MOD 30 MIN: CPT | Mod: S$PBB,,, | Performed by: INTERNAL MEDICINE

## 2022-04-27 PROCEDURE — 77067 MAMMO DIGITAL SCREENING BILAT WITH TOMO: ICD-10-PCS | Mod: 26,,, | Performed by: RADIOLOGY

## 2022-04-27 PROCEDURE — 77067 SCR MAMMO BI INCL CAD: CPT | Mod: TC,PO

## 2022-04-27 PROCEDURE — 77063 BREAST TOMOSYNTHESIS BI: CPT | Mod: TC,PO

## 2022-04-27 PROCEDURE — 77063 MAMMO DIGITAL SCREENING BILAT WITH TOMO: ICD-10-PCS | Mod: 26,,, | Performed by: RADIOLOGY

## 2022-04-27 RX ORDER — SECUKINUMAB 150 MG/ML
300 INJECTION SUBCUTANEOUS
Qty: 4 EACH | Refills: 6 | Status: SHIPPED | OUTPATIENT
Start: 2022-04-27 | End: 2022-06-17 | Stop reason: SDUPTHER

## 2022-04-27 RX ORDER — ACETAMINOPHEN 500 MG
2000 TABLET ORAL
COMMUNITY

## 2022-04-27 RX ORDER — FLUCONAZOLE 150 MG/1
150 TABLET ORAL DAILY
Qty: 2 TABLET | Refills: 0 | Status: SHIPPED | OUTPATIENT
Start: 2022-04-27 | End: 2022-04-29

## 2022-04-27 RX ORDER — TIZANIDINE HYDROCHLORIDE 4 MG/1
4 CAPSULE, GELATIN COATED ORAL NIGHTLY
COMMUNITY
End: 2022-05-04 | Stop reason: SDUPTHER

## 2022-04-27 RX ORDER — GUAIFENESIN AND PHENYLEPHRINE HCL 400; 10 MG/1; MG/1
500 TABLET ORAL DAILY
COMMUNITY

## 2022-04-27 RX ORDER — NAPROXEN SODIUM 220 MG/1
81 TABLET, FILM COATED ORAL DAILY
COMMUNITY

## 2022-04-27 NOTE — PROGRESS NOTES
Subjective:          Chief Complaint: Charla Patrick is a 62 y.o. female who had concerns including Disease Management.    HPI:  Patient is a 62-year-old female previously seen [place] for psoriatic arthritis moved from [place] patient was seen in the [facility] in [place] her last office visit being 01/12/2021.  Remote diagnosis 2006 - acute psoriasis. Joints began shortly after the skin,  joint pain was located in the fingers, hips, and both feet (chronic plantar fasciitis) .    She rates her pain a 2/10          Aggravating factors with the morning evening and during activity.    Alleviating factors were exercise and medications associated factors joint stiffness, fatigue, lack of sleep, joint pain, joint swelling, abdominal pain and night sweats.      She also notes dry eyes and dry mouth psoriasis family history of other autoimmune conditions.    Patient denies ulcerative colitis, crohns or other colitis. Only IBS to date.   Having issues with continued GI complaints and c/o depression more difficult to manage with Otezla so discontinued.   Small recurrent scalp lesion is most prevalent historically.   Started Cosentyx (8/2021)  Tolerating. Completed loading 5 doses and 8 months total thus far. Patient is at 8 months Consetyx 150mg every 30 day, joints at about level of   Patient is having chronic pain in hands and lateral hips this has always been the most recalcitrant historically.   New was the PTT that despite new inserts and shoes is still painful.     Finds Cosentyx as effective as Otezla but no GI ASE.   Rates pain 2/10   Updated Quantiferon gold 8/2021 negative.   Pain in knees (describes burning ) this visit no prior hx of knee pain. Better with activity. No swelling noting. No buckling.      Morning stiffness: 15-20min but ++gelling phenomena.     Current medication:      Previous medications trialed:  Otezla 30 mg once daily- losing efficacy  Methotrexate began 2007.   Discontinued methotrexate 05/2017 no ASE or LAE.   Enbrel without adverse side effects no mention exact type.  Humira was loss of back efficacy over time, Cimzia denied continued benefits with her insurance, Celebrex but developed a  gastritis despite this.  Otezla was 2016 good benefit.    Patient also has osteopenia her last DEXA 2018 was osteopenia but her FRAX score was below recommended bisphosphonate or other therapeutic ranges so she was continued on regular weight-bearing calcium and vitamin-D and repeat DXA is every 2 years    Have labs from 7/2021 shows no evidence transaminitis anemia, leukopenia renal function creatinine slightly elevated 1.03 normal being less than 0.99.  Vitamin-D was 60.    Chest radiograph of [id number]-  [employer] ER 7 10/23/2019 ultrasound bilateral hands  Left hand visualized portions of the bone muscle tendon joints and median nerve are normal appearing particular evaluation at the MCP 2 3 and 5 transverse views also obtained no evidence of any erosions power Doppler use for synovitis and tenosynovitis which was also negative.  I have a TB from 03/23/2020 as a QuantiFERON gold that is negative      REVIEW OF SYSTEMS:    Review of Systems   Constitutional: Negative for fever, malaise/fatigue and weight loss.   HENT: Negative for sore throat.    Eyes: Negative for double vision, photophobia and redness.   Respiratory: Negative for cough, shortness of breath and wheezing.    Cardiovascular: Negative for chest pain, palpitations and orthopnea.   Gastrointestinal: Negative for abdominal pain, constipation and diarrhea.   Genitourinary: Negative for dysuria, hematuria and urgency.   Musculoskeletal: Positive for joint pain. Negative for back pain and myalgias.   Skin: Negative for rash.   Neurological: Negative for dizziness, tingling, focal weakness and headaches.   Endo/Heme/Allergies: Does not bruise/bleed easily.   Psychiatric/Behavioral: Negative for depression, hallucinations and  suicidal ideas.               Objective:            Past Medical History:   Diagnosis Date    Allergic rhinitis     Diabetes mellitus, type 2     History of colon polyps     Hyperlipidemia     Psoriatic arthritis      Family History   Problem Relation Age of Onset    Heart disease Mother     Diabetes Mother     Brain cancer Father         Glioblastoma     Psoriasis Father     Thyroid disease Sister         hypothyroidism     Diabetes Sister     Psoriasis Sister     Cerebral palsy Daughter     Heart disease Daughter     Diabetes Daughter     No Known Problems Son     Stroke Maternal Grandmother     Diabetes Maternal Grandmother     Glaucoma Maternal Grandmother     Diabetes Maternal Grandfather     Ovarian cancer Paternal Grandmother     Psoriasis Paternal Grandmother     Pancreatic cancer Paternal Grandfather     No Known Problems Sister     Psoriasis Brother     Macular degeneration Neg Hx     Eczema Neg Hx     Lupus Neg Hx     Melanoma Neg Hx      Social History     Tobacco Use    Smoking status: Former Smoker     Packs/day: 1.00     Years: 40.00     Pack years: 40.00     Quit date: 2/14/2019     Years since quitting: 3.2    Smokeless tobacco: Never Used    Tobacco comment: former smoker   Substance Use Topics    Alcohol use: Never    Drug use: Never         Current Outpatient Medications on File Prior to Visit   Medication Sig Dispense Refill    ascorbic acid, vitamin C, (VITAMIN C) 500 MG tablet Take 500 mg by mouth once daily.      aspirin 81 MG Chew Take 81 mg by mouth once daily.      cholecalciferol, vitamin D3, (D3-2000) 50 mcg (2,000 unit) Cap capsule Take 2,000 Units by mouth.      famotidine (PEPCID) 40 MG tablet Take 40 mg by mouth once daily.      glimepiride (AMARYL) 1 MG tablet Take 1 tablet (1 mg total) by mouth before breakfast. (Patient taking differently: Take 1 mg by mouth before breakfast. Patient takes half every 12 hours) 90 tablet 3    pantoprazole  (PROTONIX) 40 MG tablet Take 1 tablet (40 mg total) by mouth once daily. 90 tablet 3    secukinumab (COSENTYX PEN) 150 mg/mL PnIj Inject 150 mg into the skin every 30 days. 3 mL 3    simvastatin (ZOCOR) 40 MG tablet TAKE 1 TABLET EVERY EVENING 90 tablet 3    tiZANidine 4 mg Cap Take 4 mg by mouth nightly.      turmeric root extract 500 mg Cap Take 500 mg by mouth once daily at 6am.      vitamin B complex (SUPER B COMPLEX-B-12 ORAL)       ALPRAZolam (XANAX) 0.5 MG tablet Take 1 tablet (0.5 mg total) by mouth 2 (two) times daily as needed for Anxiety. 30 tablet 0    loratadine (CLARITIN) 10 mg tablet Take 1 tablet (10 mg total) by mouth once daily. 30 tablet 5     No current facility-administered medications on file prior to visit.       Vitals:    04/27/22 1408   BP: 121/78   Pulse: 71       Physical Exam:    Physical Exam  Constitutional:       Appearance: She is well-developed.   Eyes:      General: Lids are normal.   Skin:     Comments: Flare psoriasis EAC bilaterally with prominent scale and erythema.    Neurological:      Mental Status: She is oriented to person, place, and time.   Psychiatric:         Behavior: Behavior normal.         Thought Content: Thought content normal.             Rapid3 Question Responses and Scores 8/18/2021   MDHAQ Score 0.6   Psychologic Score 4.4   Pain Score 2.5   When you awakened in the morning OVER THE LAST WEEK, did you feel stiff? Yes   If Yes, please indicate the number of hours until you are as limber as you will be for the day 1   Fatigue Score 7   Global Health Score 5   RAPID3 Score 3.16               Assessment:       Encounter Diagnoses   Name Primary?    Psoriatic arthritis Yes    Psoriasis     Yeast infection           Plan:        Psoriatic arthritis  -     secukinumab (COSENTYX PEN, 2 PENS,) 150 mg/mL PnIj; Inject 300 mg into the skin every 30 days.  Dispense: 4 each; Refill: 6    Psoriasis  -     secukinumab (COSENTYX PEN, 2 PENS,) 150 mg/mL PnIj; Inject  300 mg into the skin every 30 days.  Dispense: 4 each; Refill: 6    Yeast infection  -     fluconazole (DIFLUCAN) 150 MG Tab; Take 1 tablet (150 mg total) by mouth once daily. for 2 days  Dispense: 2 tablet; Refill: 0         Patient with long standing PsA:    -Patient on Cosentyx we are jnow at 6 months. Finds Cosentyx no better than Otezla. She is flaring on the skin and still sub-optimal on joints with TJC 6 and SJC 3  Will send for auth of Cosentyx at 300mg Q 30 days.       Diflucan x 2 days sent in failed OTC therapy.         Patient is aware of the risks benefits side effects and monitoring requirements of biologic therapy including but not limited to disseminated tuberculosis recurrent serious infections suppression of the immune system, injection site reactions.      Follow up in about 4 months (around 8/27/2022).        30min consultation with greater than 50% spent in counseling, chart review and coordination of care. All questions answered.

## 2022-04-28 DIAGNOSIS — K21.9 GASTROESOPHAGEAL REFLUX DISEASE WITHOUT ESOPHAGITIS: ICD-10-CM

## 2022-04-29 ENCOUNTER — PATIENT MESSAGE (OUTPATIENT)
Dept: FAMILY MEDICINE | Facility: CLINIC | Age: 62
End: 2022-04-29
Payer: OTHER GOVERNMENT

## 2022-04-29 DIAGNOSIS — E11.9 TYPE 2 DIABETES MELLITUS WITHOUT COMPLICATION, WITHOUT LONG-TERM CURRENT USE OF INSULIN: Primary | ICD-10-CM

## 2022-04-29 RX ORDER — PANTOPRAZOLE SODIUM 40 MG/1
TABLET, DELAYED RELEASE ORAL
Qty: 90 TABLET | Refills: 0 | Status: SHIPPED | OUTPATIENT
Start: 2022-04-29 | End: 2022-07-20

## 2022-04-29 RX ORDER — GLIMEPIRIDE 1 MG/1
TABLET ORAL
Qty: 90 TABLET | Refills: 0 | Status: SHIPPED | OUTPATIENT
Start: 2022-04-29 | End: 2022-08-04

## 2022-04-29 NOTE — TELEPHONE ENCOUNTER
Refill Routing Note   Medication(s) are not appropriate for processing by [facility] for the following reason(s):      - Patient has not been seen in over 15 months by PCP  - Required laboratory values are outdated  - Patient has been seen in the ED/Hospital since the last PCP visit    [facility] action(s):  Defer Medication-related problems identified: Requires labs        Medication reconciliation completed: No     Appointments  past 12m or future 3m with PCP    Date Provider   Last Visit   10/2/2020 Deyvi Vela MD   Next Visit   Visit date not found Deyvi Vela MD   ED visits in past 90 days: 0        Note composed:10:36 PM 04/28/2022

## 2022-04-29 NOTE — TELEPHONE ENCOUNTER
Care Due:                  Date            Visit Type   Department     Provider  --------------------------------------------------------------------------------                                SAME DAY -                              ESTABLISHED   [facility]  Last Visit: 10-      PATIENT      MEDICINE       Lux Reyes  Next Visit: None Scheduled  None         None Found                                                            Last  Test          Frequency    Reason                     Performed    Due Date  --------------------------------------------------------------------------------    HBA1C.......  6 months...  glimepiride..............  07- 01-    Powered by [employer]. Reference number: [id number].   4/28/2022 9:43:35 PM CDT

## 2022-05-01 ENCOUNTER — PATIENT MESSAGE (OUTPATIENT)
Dept: FAMILY MEDICINE | Facility: CLINIC | Age: 62
End: 2022-05-01
Payer: OTHER GOVERNMENT

## 2022-05-01 DIAGNOSIS — E11.9 TYPE 2 DIABETES MELLITUS WITHOUT COMPLICATION, WITHOUT LONG-TERM CURRENT USE OF INSULIN: ICD-10-CM

## 2022-05-02 NOTE — TELEPHONE ENCOUNTER
No new care gaps identified.  Powered by [employer] by [employer]. Reference number: [id number].   5/02/2022 7:26:25 AM CDT

## 2022-05-02 NOTE — TELEPHONE ENCOUNTER
Can you please [facility] the [facility], it is cheaper for patient to get them through mail order pharmacy. Medication pended with correct pharmacy. Please advise

## 2022-05-03 ENCOUNTER — PATIENT MESSAGE (OUTPATIENT)
Dept: RHEUMATOLOGY | Facility: CLINIC | Age: 62
End: 2022-05-03
Payer: OTHER GOVERNMENT

## 2022-05-04 DIAGNOSIS — M62.838 SPASM OF MUSCLE: Primary | ICD-10-CM

## 2022-05-04 NOTE — TELEPHONE ENCOUNTER
Pharmacy requesting refill on Tizanidine 4mg  Pt's LOV 04/27/2022  Pt's NOV 08/25/2022  Medication pending

## 2022-05-05 RX ORDER — TIZANIDINE HYDROCHLORIDE 4 MG/1
4 CAPSULE, GELATIN COATED ORAL NIGHTLY
Qty: 30 CAPSULE | Refills: 6 | Status: SHIPPED | OUTPATIENT
Start: 2022-05-05 | End: 2022-07-18 | Stop reason: SDUPTHER

## 2022-05-09 ENCOUNTER — PATIENT MESSAGE (OUTPATIENT)
Dept: SMOKING CESSATION | Facility: CLINIC | Age: 62
End: 2022-05-09
Payer: OTHER GOVERNMENT

## 2022-05-10 ENCOUNTER — DOCUMENTATION ONLY (OUTPATIENT)
Dept: PHARMACY | Facility: CLINIC | Age: 62
End: 2022-05-10
Payer: OTHER GOVERNMENT

## 2022-05-10 NOTE — PROGRESS NOTES
DOCUMENTATION RECEIVED FOR PA FOR TIZANIDINE 4 MG CAPSULES.     CALLED PATIENT TO CONFIRM IF CAPSULES WERE NECESSARY AT WHICH TIME PATIENT EXPLAINED THEY ARE CURRENTLY TAKING TABLETS.    FILLED FORM OUT WITH THE OK TO CHANGE FROM CAPSULES TO TABLETS      KAYLYN VALENTIN CPHT  MED ACCESS  05/10/2022

## 2022-05-16 ENCOUNTER — OFFICE VISIT (OUTPATIENT)
Dept: OBSTETRICS AND GYNECOLOGY | Facility: CLINIC | Age: 62
End: 2022-05-16
Payer: OTHER GOVERNMENT

## 2022-05-16 VITALS — BODY MASS INDEX: 30.49 KG/M2 | WEIGHT: 183.19 LBS

## 2022-05-16 DIAGNOSIS — N90.89 VULVAR IRRITATION: Primary | ICD-10-CM

## 2022-05-16 PROCEDURE — 99213 OFFICE O/P EST LOW 20 MIN: CPT | Mod: PBBFAC,PN | Performed by: OBSTETRICS & GYNECOLOGY

## 2022-05-16 PROCEDURE — 99203 PR OFFICE/OUTPT VISIT, NEW, LEVL III, 30-44 MIN: ICD-10-PCS | Mod: S$PBB,,, | Performed by: OBSTETRICS & GYNECOLOGY

## 2022-05-16 PROCEDURE — 99203 OFFICE O/P NEW LOW 30 MIN: CPT | Mod: S$PBB,,, | Performed by: OBSTETRICS & GYNECOLOGY

## 2022-05-16 PROCEDURE — 99999 PR PBB SHADOW E&M-EST. PATIENT-LVL III: CPT | Mod: PBBFAC,,, | Performed by: OBSTETRICS & GYNECOLOGY

## 2022-05-16 PROCEDURE — 99999 PR PBB SHADOW E&M-EST. PATIENT-LVL III: ICD-10-PCS | Mod: PBBFAC,,, | Performed by: OBSTETRICS & GYNECOLOGY

## 2022-05-16 RX ORDER — CLOBETASOL PROPIONATE 0.5 MG/G
OINTMENT TOPICAL
Qty: 45 G | Refills: 2 | Status: SHIPPED | OUTPATIENT
Start: 2022-05-16

## 2022-05-16 NOTE — PROGRESS NOTES
Chief Complaint   Patient presents with    Establish Care     Pt has arthritis and has changed medication that's starting to cause problems in the vaginal area         History of Present Illness   62 y.o. F patient presents today for GYN consult for vulvar issues    She has possible pubic abscess  H/o one in the past, complicated by sepsis    She has Psoriatic Arthritis  Started a new medication  She thinks she has vaginal psoriasis  C/o burning and itching  Not improved with diflucan  Betamethasone .05%    H/o Hyst young, stopped HRT in 50s  She is not on HRT    Skin tears easily  She is not on vaginal estrogen     Past medical and surgical history reviewed.   I have reviewed the patient's medical history in detail and updated the computerized patient record.  Review of patient's allergies indicates:   Allergen Reactions    Court Other (See Comments)     Angioedema     Codeine      Nausea and Shaky    Zoloft [sertraline]      Feeling a roller coaster in chest up to neck     Enbrel [etanercept] Rash    Lamisil [terbinafine hcl] Rash     Review of Systems  Constitutional: negative for chills and fatigue  Gastrointestinal: negative for abdominal pain, constipation, diarrhea, nausea and vomiting  Genitourinary:negative for abnormal menstrual periods, dysuria, frequency and hematuria    Physical Examination:  Wt 83.1 kg (183 lb 3.2 oz)   BMI 30.49 kg/m²    Physical Exam:   Constitutional: She appears well-developed and well-nourished. No distress.               Genitourinary:    Inguinal canal normal.   Labial bartholins normal.   Genitourinary Comments: Diffuse irritation, suspected lichen. No abscess noted.                      Assessment/Plan:  Vulvar irritation  -     clobetasol 0.05% (TEMOVATE) 0.05 % Oint; Clobetasol propionate 0.05% once daily at night for 4 weeks, then alternate nights for 4 weeks, and then twice weekly for 4 weeks  Dispense: 45 g; Refill: 2    FU in three months

## 2022-06-08 ENCOUNTER — TELEPHONE (OUTPATIENT)
Dept: FAMILY MEDICINE | Facility: CLINIC | Age: 62
End: 2022-06-08
Payer: OTHER GOVERNMENT

## 2022-06-08 DIAGNOSIS — E11.9 TYPE 2 DIABETES MELLITUS WITHOUT COMPLICATION, WITHOUT LONG-TERM CURRENT USE OF INSULIN: Primary | ICD-10-CM

## 2022-06-11 ENCOUNTER — PATIENT MESSAGE (OUTPATIENT)
Dept: FAMILY MEDICINE | Facility: CLINIC | Age: 62
End: 2022-06-11
Payer: OTHER GOVERNMENT

## 2022-06-15 ENCOUNTER — PATIENT MESSAGE (OUTPATIENT)
Dept: RHEUMATOLOGY | Facility: CLINIC | Age: 62
End: 2022-06-15
Payer: OTHER GOVERNMENT

## 2022-06-15 DIAGNOSIS — L40.9 PSORIASIS: ICD-10-CM

## 2022-06-15 DIAGNOSIS — L40.50 PSORIATIC ARTHRITIS: ICD-10-CM

## 2022-06-17 ENCOUNTER — PATIENT MESSAGE (OUTPATIENT)
Dept: RHEUMATOLOGY | Facility: CLINIC | Age: 62
End: 2022-06-17
Payer: OTHER GOVERNMENT

## 2022-06-17 RX ORDER — SECUKINUMAB 150 MG/ML
300 INJECTION SUBCUTANEOUS
Qty: 4 EACH | Refills: 6 | Status: SHIPPED | OUTPATIENT
Start: 2022-06-17 | End: 2022-06-17 | Stop reason: SDUPTHER

## 2022-06-17 RX ORDER — SECUKINUMAB 150 MG/ML
300 INJECTION SUBCUTANEOUS
Qty: 6 EACH | Refills: 4 | Status: SHIPPED | OUTPATIENT
Start: 2022-06-17 | End: 2022-08-25

## 2022-06-17 RX ORDER — SECUKINUMAB 150 MG/ML
INJECTION SUBCUTANEOUS
Refills: 0 | OUTPATIENT
Start: 2022-06-17

## 2022-07-06 ENCOUNTER — PATIENT MESSAGE (OUTPATIENT)
Dept: RHEUMATOLOGY | Facility: CLINIC | Age: 62
End: 2022-07-06
Payer: OTHER GOVERNMENT

## 2022-07-11 ENCOUNTER — LAB VISIT (OUTPATIENT)
Dept: LAB | Facility: HOSPITAL | Age: 62
End: 2022-07-11
Attending: INTERNAL MEDICINE
Payer: OTHER GOVERNMENT

## 2022-07-11 DIAGNOSIS — E11.9 TYPE 2 DIABETES MELLITUS WITHOUT COMPLICATION, WITHOUT LONG-TERM CURRENT USE OF INSULIN: ICD-10-CM

## 2022-07-11 LAB
CHOLEST SERPL-MCNC: 131 MG/DL (ref 120–199)
CHOLEST/HDLC SERPL: 2.6 {RATIO} (ref 2–5)
ESTIMATED AVG GLUCOSE: 128 MG/DL (ref 68–131)
HBA1C MFR BLD: 6.1 % (ref 4–5.6)
HDLC SERPL-MCNC: 50 MG/DL (ref 40–75)
HDLC SERPL: 38.2 % (ref 20–50)
LDLC SERPL CALC-MCNC: 58.4 MG/DL (ref 63–159)
NONHDLC SERPL-MCNC: 81 MG/DL
TRIGL SERPL-MCNC: 113 MG/DL (ref 30–150)

## 2022-07-11 PROCEDURE — 80061 LIPID PANEL: CPT | Performed by: INTERNAL MEDICINE

## 2022-07-11 PROCEDURE — 36415 COLL VENOUS BLD VENIPUNCTURE: CPT | Mod: PO | Performed by: INTERNAL MEDICINE

## 2022-07-11 PROCEDURE — 83036 HEMOGLOBIN GLYCOSYLATED A1C: CPT | Performed by: INTERNAL MEDICINE

## 2022-07-14 ENCOUNTER — OFFICE VISIT (OUTPATIENT)
Dept: FAMILY MEDICINE | Facility: CLINIC | Age: 62
End: 2022-07-14
Payer: OTHER GOVERNMENT

## 2022-07-14 ENCOUNTER — HOSPITAL ENCOUNTER (OUTPATIENT)
Dept: RADIOLOGY | Facility: HOSPITAL | Age: 62
Discharge: HOME | End: 2022-07-14
Attending: INTERNAL MEDICINE
Payer: OTHER GOVERNMENT

## 2022-07-14 VITALS
BODY MASS INDEX: 30.49 KG/M2 | SYSTOLIC BLOOD PRESSURE: 122 MMHG | OXYGEN SATURATION: 98 % | WEIGHT: 183 LBS | DIASTOLIC BLOOD PRESSURE: 76 MMHG | HEART RATE: 80 BPM | HEIGHT: 65 IN

## 2022-07-14 DIAGNOSIS — M54.12 CERVICAL RADICULOPATHY: ICD-10-CM

## 2022-07-14 DIAGNOSIS — E78.49 OTHER HYPERLIPIDEMIA: ICD-10-CM

## 2022-07-14 DIAGNOSIS — L40.50 PSORIATIC ARTHRITIS: ICD-10-CM

## 2022-07-14 DIAGNOSIS — E11.9 TYPE 2 DIABETES MELLITUS WITHOUT COMPLICATION, WITHOUT LONG-TERM CURRENT USE OF INSULIN: ICD-10-CM

## 2022-07-14 DIAGNOSIS — Z00.00 WELLNESS EXAMINATION: Primary | ICD-10-CM

## 2022-07-14 DIAGNOSIS — K21.9 GASTROESOPHAGEAL REFLUX DISEASE WITHOUT ESOPHAGITIS: ICD-10-CM

## 2022-07-14 PROCEDURE — 72050 X-RAY EXAM NECK SPINE 4/5VWS: CPT | Mod: 26,,, | Performed by: RADIOLOGY

## 2022-07-14 PROCEDURE — 99215 OFFICE O/P EST HI 40 MIN: CPT | Mod: PBBFAC,PO | Performed by: INTERNAL MEDICINE

## 2022-07-14 PROCEDURE — 99999 PR PBB SHADOW E&M-EST. PATIENT-LVL V: CPT | Mod: PBBFAC,,, | Performed by: INTERNAL MEDICINE

## 2022-07-14 PROCEDURE — 99999 PR PBB SHADOW E&M-EST. PATIENT-LVL V: ICD-10-PCS | Mod: PBBFAC,,, | Performed by: INTERNAL MEDICINE

## 2022-07-14 PROCEDURE — 72050 X-RAY EXAM NECK SPINE 4/5VWS: CPT | Mod: TC,FY,PO

## 2022-07-14 PROCEDURE — 99396 PREV VISIT EST AGE 40-64: CPT | Mod: S$PBB,,, | Performed by: INTERNAL MEDICINE

## 2022-07-14 PROCEDURE — 99396 PR PREVENTIVE VISIT,EST,40-64: ICD-10-PCS | Mod: S$PBB,,, | Performed by: INTERNAL MEDICINE

## 2022-07-14 PROCEDURE — 72050 XR CERVICAL SPINE COMPLETE 5 VIEW: ICD-10-PCS | Mod: 26,,, | Performed by: RADIOLOGY

## 2022-07-15 ENCOUNTER — PATIENT MESSAGE (OUTPATIENT)
Dept: RHEUMATOLOGY | Facility: CLINIC | Age: 62
End: 2022-07-15
Payer: OTHER GOVERNMENT

## 2022-07-15 ENCOUNTER — PATIENT MESSAGE (OUTPATIENT)
Dept: FAMILY MEDICINE | Facility: CLINIC | Age: 62
End: 2022-07-15
Payer: OTHER GOVERNMENT

## 2022-07-15 ENCOUNTER — TELEPHONE (OUTPATIENT)
Dept: FAMILY MEDICINE | Facility: CLINIC | Age: 62
End: 2022-07-15
Payer: OTHER GOVERNMENT

## 2022-07-15 DIAGNOSIS — M62.838 SPASM OF MUSCLE: ICD-10-CM

## 2022-07-18 ENCOUNTER — IMMUNIZATION (OUTPATIENT)
Dept: FAMILY MEDICINE | Facility: CLINIC | Age: 62
End: 2022-07-18
Payer: OTHER GOVERNMENT

## 2022-07-18 DIAGNOSIS — Z23 NEED FOR VACCINATION: Primary | ICD-10-CM

## 2022-07-18 PROCEDURE — 0054A COVID-19, MRNA, LNP-S, PF, 30 MCG/0.3 ML DOSE VACCINE (PFIZER): CPT | Mod: PBBFAC,PO

## 2022-07-19 DIAGNOSIS — K21.9 GASTROESOPHAGEAL REFLUX DISEASE WITHOUT ESOPHAGITIS: ICD-10-CM

## 2022-07-19 RX ORDER — TIZANIDINE HYDROCHLORIDE 4 MG/1
4 CAPSULE, GELATIN COATED ORAL NIGHTLY
Qty: 30 CAPSULE | Refills: 6 | Status: SHIPPED | OUTPATIENT
Start: 2022-07-19 | End: 2022-08-24

## 2022-07-19 NOTE — TELEPHONE ENCOUNTER
No new care gaps identified.  [facility] Embedded Care Gaps. Reference number: [id number]. 7/19/2022   2:12:30 PM CDT

## 2022-07-20 RX ORDER — PANTOPRAZOLE SODIUM 40 MG/1
TABLET, DELAYED RELEASE ORAL
Qty: 90 TABLET | Refills: 3 | Status: SHIPPED | OUTPATIENT
Start: 2022-07-20 | End: 2023-06-26

## 2022-07-20 NOTE — TELEPHONE ENCOUNTER
Refill Decision Note   Charla Barnessanthosh  is requesting a refill authorization.  Brief Assessment and Rationale for Refill:  Approve     Medication Therapy Plan:       Medication Reconciliation Completed: No   Comments:     No Care Gaps recommended.     Note composed:9:06 AM 07/20/2022

## 2022-08-03 ENCOUNTER — PATIENT MESSAGE (OUTPATIENT)
Dept: RHEUMATOLOGY | Facility: CLINIC | Age: 62
End: 2022-08-03
Payer: OTHER GOVERNMENT

## 2022-08-03 NOTE — TELEPHONE ENCOUNTER
No new care gaps identified.  [facility] Embedded Care Gaps. Reference number: [id number]. 8/03/2022   6:33:23 PM CDT

## 2022-08-04 ENCOUNTER — PATIENT MESSAGE (OUTPATIENT)
Dept: RHEUMATOLOGY | Facility: CLINIC | Age: 62
End: 2022-08-04
Payer: OTHER GOVERNMENT

## 2022-08-04 RX ORDER — GLIMEPIRIDE 1 MG/1
1 TABLET ORAL
Qty: 90 TABLET | Refills: 1 | Status: SHIPPED | OUTPATIENT
Start: 2022-08-04 | End: 2022-08-28 | Stop reason: SDUPTHER

## 2022-08-04 NOTE — TELEPHONE ENCOUNTER
Refill Decision Note   Charla Barnessanthosh  is requesting a refill authorization.  Brief Assessment and Rationale for Refill:  Approve    -Medication-Related Problems Identified: Medication education needs  Medication Therapy Plan:       Medication Reconciliation Completed: No   Comments:     No Care Gaps recommended.     Note composed:12:25 PM 08/04/2022

## 2022-08-16 ENCOUNTER — OFFICE VISIT (OUTPATIENT)
Dept: OBSTETRICS AND GYNECOLOGY | Facility: CLINIC | Age: 62
End: 2022-08-16
Payer: OTHER GOVERNMENT

## 2022-08-16 VITALS
SYSTOLIC BLOOD PRESSURE: 122 MMHG | WEIGHT: 181.88 LBS | DIASTOLIC BLOOD PRESSURE: 58 MMHG | BODY MASS INDEX: 30.27 KG/M2

## 2022-08-16 DIAGNOSIS — N95.2 VAGINAL ATROPHY: Primary | ICD-10-CM

## 2022-08-16 DIAGNOSIS — N90.89 VULVAR IRRITATION: ICD-10-CM

## 2022-08-16 PROCEDURE — 99213 PR OFFICE/OUTPT VISIT, EST, LEVL III, 20-29 MIN: ICD-10-PCS | Mod: S$PBB,,, | Performed by: OBSTETRICS & GYNECOLOGY

## 2022-08-16 PROCEDURE — 99999 PR PBB SHADOW E&M-EST. PATIENT-LVL III: ICD-10-PCS | Mod: PBBFAC,,, | Performed by: OBSTETRICS & GYNECOLOGY

## 2022-08-16 PROCEDURE — 99213 OFFICE O/P EST LOW 20 MIN: CPT | Mod: S$PBB,,, | Performed by: OBSTETRICS & GYNECOLOGY

## 2022-08-16 PROCEDURE — 99999 PR PBB SHADOW E&M-EST. PATIENT-LVL III: CPT | Mod: PBBFAC,,, | Performed by: OBSTETRICS & GYNECOLOGY

## 2022-08-16 PROCEDURE — 99213 OFFICE O/P EST LOW 20 MIN: CPT | Mod: PBBFAC,PN | Performed by: OBSTETRICS & GYNECOLOGY

## 2022-08-16 RX ORDER — ESTRADIOL 0.1 MG/G
CREAM VAGINAL
Qty: 42.5 G | Refills: 1 | Status: SHIPPED | OUTPATIENT
Start: 2022-08-16 | End: 2022-08-29 | Stop reason: SDUPTHER

## 2022-08-17 ENCOUNTER — PATIENT MESSAGE (OUTPATIENT)
Dept: RHEUMATOLOGY | Facility: CLINIC | Age: 62
End: 2022-08-17
Payer: OTHER GOVERNMENT

## 2022-08-22 ENCOUNTER — DOCUMENTATION ONLY (OUTPATIENT)
Dept: PHARMACY | Facility: CLINIC | Age: 62
End: 2022-08-22
Payer: OTHER GOVERNMENT

## 2022-08-22 ENCOUNTER — LAB VISIT (OUTPATIENT)
Dept: LAB | Facility: HOSPITAL | Age: 62
End: 2022-08-22
Attending: INTERNAL MEDICINE
Payer: OTHER GOVERNMENT

## 2022-08-22 DIAGNOSIS — D84.9 IMMUNOSUPPRESSION: ICD-10-CM

## 2022-08-22 DIAGNOSIS — L40.50 PSORIATIC ARTHRITIS: ICD-10-CM

## 2022-08-22 LAB
ALBUMIN SERPL BCP-MCNC: 4 G/DL (ref 3.5–5.2)
ALP SERPL-CCNC: 91 U/L (ref 55–135)
ALT SERPL W/O P-5'-P-CCNC: 26 U/L (ref 10–44)
ANION GAP SERPL CALC-SCNC: 9 MMOL/L (ref 8–16)
AST SERPL-CCNC: 19 U/L (ref 10–40)
BASOPHILS # BLD AUTO: 0.05 K/UL (ref 0–0.2)
BASOPHILS NFR BLD: 0.9 % (ref 0–1.9)
BILIRUB SERPL-MCNC: 0.4 MG/DL (ref 0.1–1)
BUN SERPL-MCNC: 16 MG/DL (ref 8–23)
CALCIUM SERPL-MCNC: 9.6 MG/DL (ref 8.7–10.5)
CHLORIDE SERPL-SCNC: 102 MMOL/L (ref 95–110)
CO2 SERPL-SCNC: 26 MMOL/L (ref 23–29)
CREAT SERPL-MCNC: 0.9 MG/DL (ref 0.5–1.4)
CRP SERPL-MCNC: 5.7 MG/L (ref 0–8.2)
DIFFERENTIAL METHOD: NORMAL
EOSINOPHIL # BLD AUTO: 0.1 K/UL (ref 0–0.5)
EOSINOPHIL NFR BLD: 2.3 % (ref 0–8)
ERYTHROCYTE [DISTWIDTH] IN BLOOD BY AUTOMATED COUNT: 13.4 % (ref 11.5–14.5)
ERYTHROCYTE [SEDIMENTATION RATE] IN BLOOD BY PHOTOMETRIC METHOD: 25 MM/HR (ref 0–36)
EST. GFR  (NO RACE VARIABLE): >60 ML/MIN/1.73 M^2
GLUCOSE SERPL-MCNC: 209 MG/DL (ref 70–110)
HCT VFR BLD AUTO: 41.9 % (ref 37–48.5)
HGB BLD-MCNC: 13.4 G/DL (ref 12–16)
IMM GRANULOCYTES # BLD AUTO: 0.02 K/UL (ref 0–0.04)
IMM GRANULOCYTES NFR BLD AUTO: 0.3 % (ref 0–0.5)
LYMPHOCYTES # BLD AUTO: 1.8 K/UL (ref 1–4.8)
LYMPHOCYTES NFR BLD: 31.4 % (ref 18–48)
MCH RBC QN AUTO: 28.5 PG (ref 27–31)
MCHC RBC AUTO-ENTMCNC: 32 G/DL (ref 32–36)
MCV RBC AUTO: 89 FL (ref 82–98)
MONOCYTES # BLD AUTO: 0.4 K/UL (ref 0.3–1)
MONOCYTES NFR BLD: 7.7 % (ref 4–15)
NEUTROPHILS # BLD AUTO: 3.3 K/UL (ref 1.8–7.7)
NEUTROPHILS NFR BLD: 57.4 % (ref 38–73)
NRBC BLD-RTO: 0 /100 WBC
PLATELET # BLD AUTO: 214 K/UL (ref 150–450)
PMV BLD AUTO: 11.5 FL (ref 9.2–12.9)
POTASSIUM SERPL-SCNC: 4.2 MMOL/L (ref 3.5–5.1)
PROT SERPL-MCNC: 7.2 G/DL (ref 6–8.4)
RBC # BLD AUTO: 4.71 M/UL (ref 4–5.4)
SODIUM SERPL-SCNC: 137 MMOL/L (ref 136–145)
WBC # BLD AUTO: 5.73 K/UL (ref 3.9–12.7)

## 2022-08-22 PROCEDURE — 36415 COLL VENOUS BLD VENIPUNCTURE: CPT | Mod: PO | Performed by: INTERNAL MEDICINE

## 2022-08-22 PROCEDURE — 86140 C-REACTIVE PROTEIN: CPT | Performed by: INTERNAL MEDICINE

## 2022-08-22 PROCEDURE — 85652 RBC SED RATE AUTOMATED: CPT | Performed by: INTERNAL MEDICINE

## 2022-08-22 PROCEDURE — 80053 COMPREHEN METABOLIC PANEL: CPT | Performed by: INTERNAL MEDICINE

## 2022-08-22 PROCEDURE — 85025 COMPLETE CBC W/AUTO DIFF WBC: CPT | Performed by: INTERNAL MEDICINE

## 2022-08-24 ENCOUNTER — TELEPHONE (OUTPATIENT)
Dept: RHEUMATOLOGY | Facility: CLINIC | Age: 62
End: 2022-08-24

## 2022-08-24 RX ORDER — TIZANIDINE 4 MG/1
4 TABLET ORAL NIGHTLY PRN
Qty: 30 TABLET | Refills: 3 | Status: SHIPPED | OUTPATIENT
Start: 2022-08-24 | End: 2022-08-25 | Stop reason: SDUPTHER

## 2022-08-24 NOTE — TELEPHONE ENCOUNTER
----- Message from Kaylyn Yousif sent at 8/22/2022  9:41 AM CDT -----  I SENT A MESSAGE LAST WEEK ASKING IF THE PATIENT CAN TAKE TIZANIDINE 4 MG TABLETS INSTEAD OF CAPSULES.    A PA IS NEEDED FOR CAPSULES.      KAYLYN YOUSIF, [facility]  MED ACCESS

## 2022-08-25 ENCOUNTER — OFFICE VISIT (OUTPATIENT)
Dept: RHEUMATOLOGY | Facility: CLINIC | Age: 62
End: 2022-08-25
Payer: OTHER GOVERNMENT

## 2022-08-25 ENCOUNTER — SPECIALTY PHARMACY (OUTPATIENT)
Dept: PHARMACY | Facility: CLINIC | Age: 62
End: 2022-08-25
Payer: OTHER GOVERNMENT

## 2022-08-25 VITALS
HEART RATE: 76 BPM | WEIGHT: 184 LBS | HEIGHT: 65 IN | DIASTOLIC BLOOD PRESSURE: 76 MMHG | SYSTOLIC BLOOD PRESSURE: 135 MMHG | BODY MASS INDEX: 30.66 KG/M2

## 2022-08-25 DIAGNOSIS — D84.9 IMMUNOSUPPRESSION: ICD-10-CM

## 2022-08-25 DIAGNOSIS — L40.9 PSORIASIS: ICD-10-CM

## 2022-08-25 DIAGNOSIS — L40.50 PSORIATIC ARTHRITIS: Primary | ICD-10-CM

## 2022-08-25 PROCEDURE — 99215 PR OFFICE/OUTPT VISIT, EST, LEVL V, 40-54 MIN: ICD-10-PCS | Mod: S$PBB,,, | Performed by: INTERNAL MEDICINE

## 2022-08-25 PROCEDURE — 99215 OFFICE O/P EST HI 40 MIN: CPT | Mod: S$PBB,,, | Performed by: INTERNAL MEDICINE

## 2022-08-25 PROCEDURE — 99999 PR PBB SHADOW E&M-EST. PATIENT-LVL IV: ICD-10-PCS | Mod: PBBFAC,,, | Performed by: INTERNAL MEDICINE

## 2022-08-25 PROCEDURE — 99999 PR PBB SHADOW E&M-EST. PATIENT-LVL IV: CPT | Mod: PBBFAC,,, | Performed by: INTERNAL MEDICINE

## 2022-08-25 PROCEDURE — 99214 OFFICE O/P EST MOD 30 MIN: CPT | Mod: PBBFAC,PN | Performed by: INTERNAL MEDICINE

## 2022-08-25 RX ORDER — RISANKIZUMAB-RZAA 150 MG/ML
150 INJECTION SUBCUTANEOUS
Qty: 1 ML | Refills: 3 | Status: SHIPPED | OUTPATIENT
Start: 2022-08-25 | End: 2022-09-02

## 2022-08-25 RX ORDER — METHOTREXATE 2.5 MG/1
10 TABLET ORAL
Qty: 48 TABLET | Refills: 1 | Status: SHIPPED | OUTPATIENT
Start: 2022-08-25 | End: 2022-12-19 | Stop reason: SDUPTHER

## 2022-08-25 RX ORDER — FOLIC ACID 1 MG/1
1 TABLET ORAL DAILY
Qty: 100 TABLET | Refills: 3 | Status: SHIPPED | OUTPATIENT
Start: 2022-08-25 | End: 2022-12-19 | Stop reason: SDUPTHER

## 2022-08-25 RX ORDER — TIZANIDINE 4 MG/1
4 TABLET ORAL NIGHTLY PRN
Qty: 90 TABLET | Refills: 3 | Status: SHIPPED | OUTPATIENT
Start: 2022-08-25 | End: 2022-12-19 | Stop reason: SDUPTHER

## 2022-08-25 RX ORDER — RISANKIZUMAB-RZAA 150 MG/ML
INJECTION SUBCUTANEOUS
Qty: 2 ML | Refills: 0 | Status: SHIPPED | OUTPATIENT
Start: 2022-08-25 | End: 2022-09-02

## 2022-08-25 ASSESSMENT — ROUTINE ASSESSMENT OF PATIENT INDEX DATA (RAPID3)
MDHAQ FUNCTION SCORE: 0.7
PATIENT GLOBAL ASSESSMENT SCORE: 6
PAIN SCORE: 6
FATIGUE SCORE: 2.2
PSYCHOLOGICAL DISTRESS SCORE: 1.1
TOTAL RAPID3 SCORE: 4.78

## 2022-08-25 NOTE — TELEPHONE ENCOUNTER
Emy Rebollar, this is Phyllis Nam with [facility].  We are working on your prescription that your doctor has sent us. We will be working with your insurance to get this approved for you. We will be calling you along the way with updates on your medication.  If you have any questions, you can reach us at [phone].    Welcome call outcome: Patient/caregiver reached     PA denied due to patient not having tried/failed Stelara. Messaged MDO

## 2022-08-25 NOTE — PROGRESS NOTES
Subjective:          Chief Complaint: Charla Patrick is a 62 y.o. female who had concerns including Disease Management.    HPI:  Patient is a 62-year-old female previously seen [place] for psoriatic arthritis moved from [place] patient was seen in the [facility] in [place] her last office visit being 01/12/2021.  Remote diagnosis 2006 - acute psoriasis. Joints began shortly after the skin,  joint pain was located in the fingers, hips, and both feet (chronic plantar fasciitis) .           Aggravating factors with the morning evening and during activity.    Alleviating factors were exercise and medications associated factors joint stiffness, fatigue, lack of sleep, joint pain, joint swelling, abdominal pain and night sweats.      She also notes dry eyes and dry mouth psoriasis family history of other autoimmune conditions.    Patient denies ulcerative colitis, crohns or other colitis. Only IBS to date.   Having issues with continued GI complaints and c/o depression more difficult to manage with Otezla so discontinued.   Small recurrent scalp lesion is most prevalent historically.   Started Cosentyx (8/2021)  Tolerating. Completed loading 5 doses and 8 months total thus far. Patient is at 8 months Consetyx 150mg every 30 day,  as effective as Otezla but no GI ASE. Now at nearly 1 year adjust to Cosentyx 300mg montly and Rapid 3 worsening. Patient notes pain 6/10 right hip, right knee and hands.     Updated Quantiferon gold 8/2021 negative.   Pain in knees (describes burning ) right knee is worsening with pop fossa pain.  no prior hx of knee pain. Better with activity. No swelling noting. No buckling.  Morning stiffness: 15-20min but ++gelling phenomena.     Current medication:  Cosentyx 300mg monthly  tizandine 4mg tablets.   Tylenol PRN    Previous medications trialed:    Methotrexate began 2007.  Discontinued methotrexate 05/2017 no ASE or LAE.   Enbrel without adverse side effects no  mention exact type.  Humira was loss of back efficacy over time,  Cimzia no benefit.   Otezla 30 mg once daily (0489-9231)- losing efficacy  Celebrex but developed a  gastritis despite this.    No personal hx of Cancer  No DVT/seizure or stroke, startin estradiol     Patient also has osteopenia her last DEXA 2018 was osteopenia but her FRAX score was below recommended bisphosphonate or other therapeutic ranges so she was continued on regular weight-bearing calcium and vitamin-D and repeat DXA is every 2 years    [facility] 7 10/23/2019 ultrasound bilateral hands  Left hand visualized portions of the bone muscle tendon joints and median nerve are normal appearing particular evaluation at the MCP 2 3 and 5 transverse views also obtained no evidence of any erosions power Doppler use for synovitis and tenosynovitis which was also negative.  I have a TB from 03/23/2020 as a QuantiFERON gold that is negative      REVIEW OF SYSTEMS:    Review of Systems   Constitutional: Negative for fever, malaise/fatigue and weight loss.   HENT: Negative for sore throat.    Eyes: Negative for double vision, photophobia and redness.   Respiratory: Negative for cough, shortness of breath and wheezing.    Cardiovascular: Negative for chest pain, palpitations and orthopnea.   Gastrointestinal: Negative for abdominal pain, constipation and diarrhea.   Genitourinary: Negative for dysuria, hematuria and urgency.   Musculoskeletal: Positive for joint pain. Negative for back pain and myalgias.   Skin: Negative for rash.   Neurological: Negative for dizziness, tingling, focal weakness and headaches.   Endo/Heme/Allergies: Does not bruise/bleed easily.   Psychiatric/Behavioral: Negative for depression, hallucinations and suicidal ideas.               Objective:            Past Medical History:   Diagnosis Date    Allergic rhinitis     Diabetes mellitus, type 2     History of colon polyps     Hyperlipidemia     Psoriatic arthritis      Family  History   Problem Relation Age of Onset    Heart disease Mother     Diabetes Mother     Brain cancer Father         Glioblastoma     Psoriasis Father     Thyroid disease Sister         hypothyroidism     Diabetes Sister     Psoriasis Sister     Cerebral palsy Daughter     Heart disease Daughter     Diabetes Daughter     No Known Problems Son     Stroke Maternal Grandmother     Diabetes Maternal Grandmother     Glaucoma Maternal Grandmother     Diabetes Maternal Grandfather     Ovarian cancer Paternal Grandmother     Psoriasis Paternal Grandmother     Pancreatic cancer Paternal Grandfather     No Known Problems Sister     Psoriasis Brother     Macular degeneration Neg Hx     Eczema Neg Hx     Lupus Neg Hx     Melanoma Neg Hx      Social History     Tobacco Use    Smoking status: Former Smoker     Packs/day: 1.00     Years: 40.00     Pack years: 40.00     Quit date: 2/14/2019     Years since quitting: 3.5    Smokeless tobacco: Never Used    Tobacco comment: former smoker   Substance Use Topics    Alcohol use: Never    Drug use: Never         Current Outpatient Medications on File Prior to Visit   Medication Sig Dispense Refill    ascorbic acid, vitamin C, (VITAMIN C) 500 MG tablet Take 500 mg by mouth once daily.      aspirin 81 MG Chew Take 81 mg by mouth once daily.      blood sugar diagnostic Strp To check BG 2 times daily, to use with insurance preferred meter 200 strip 3    cholecalciferol, vitamin D3, (VITAMIN D3) 50 mcg (2,000 unit) Cap capsule Take 2,000 Units by mouth.      estradioL (ESTRACE) 0.01 % (0.1 mg/gram) vaginal cream Place 2 g vaginally every evening for 14 days, THEN 1 g every evening for 14 days, THEN 1 g twice a week. 42.5 g 1    famotidine (PEPCID) 40 MG tablet Take 40 mg by mouth once daily.      glimepiride (AMARYL) 1 MG tablet Take 1 tablet (1 mg total) by mouth daily with breakfast. 90 tablet 1    pantoprazole (PROTONIX) 40 MG tablet TAKE 1 TABLET DAILY  90 tablet 3    secukinumab (COSENTYX PEN, 2 PENS,) 150 mg/mL PnIj Inject 300 mg into the skin every 30 days. 6 each 4    simvastatin (ZOCOR) 40 MG tablet TAKE 1 TABLET EVERY EVENING 90 tablet 3    sulfacetamide sodium-sulfur 10-5 % (w/w) Clsr Use to wash face daily 340 g 11    turmeric root extract 500 mg Cap Take 500 mg by mouth once daily at 6am.      vitamin B complex (SUPER B COMPLEX-B-12 ORAL)       ALPRAZolam (XANAX) 0.5 MG tablet Take 1 tablet (0.5 mg total) by mouth 2 (two) times daily as needed for Anxiety. 30 tablet 0    clobetasol 0.05% (TEMOVATE) 0.05 % Oint Clobetasol propionate 0.05% once daily at night for 4 weeks, then alternate nights for 4 weeks, and then twice weekly for 4 weeks (Patient not taking: Reported on 8/25/2022) 45 g 2    loratadine (CLARITIN) 10 mg tablet Take 1 tablet (10 mg total) by mouth once daily. (Patient not taking: Reported on 8/25/2022) 90 tablet 1    tiZANidine (ZANAFLEX) 4 MG tablet Take 1 tablet (4 mg total) by mouth nightly as needed (spasm of muscle). (Patient not taking: Reported on 8/25/2022) 30 tablet 3     No current facility-administered medications on file prior to visit.       Vitals:    08/25/22 1259   BP: 135/76   Pulse: 76       Physical Exam:    Physical Exam  Constitutional:       Appearance: She is well-developed.   Eyes:      General: Lids are normal.   Skin:     Comments: Flare psoriasis EAC bilaterally with prominent scale and erythema.    Neurological:      Mental Status: She is oriented to person, place, and time.   Psychiatric:         Behavior: Behavior normal.         Thought Content: Thought content normal.               Rapid3 Question Responses and Scores 8/18/2021   MDHAQ Score 0.6   Psychologic Score 4.4   Pain Score 2.5   When you awakened in the morning OVER THE LAST WEEK, did you feel stiff? Yes   If Yes, please indicate the number of hours until you are as limber as you will be for the day 1   Fatigue Score 7   Global Health Score 5    RAPID3 Score 3.16     Rapid 3: 4.78.           Assessment:       Encounter Diagnoses   Name Primary?    Psoriatic arthritis Yes    Psoriasis           Plan:        Psoriatic arthritis    Psoriasis    Other orders  -     tiZANidine (ZANAFLEX) 4 MG tablet; Take 1 tablet (4 mg total) by mouth nightly as needed (spasm of muscle).  Dispense: 90 tablet; Refill: 3         Patient with long standing PsA:    -Patient on Cosentyx 150mg monthly 9/2022.   Adjusted  Cosentyx at 300mg Q 30 days on 4/27/22 no better Rapid 3 is rising. Increase skin and joint pain - will DC today.     Enbrel without adverse side effects no mention exact type.  Humira was loss of back efficacy over time,  Cimzia no benefit.   Otezla 30 mg once daily (7221-8203)- losing efficacy  Celebrex but developed a  gastritis despite this.    SQUIRES-28 (CRP): 5.16 (High disease activity)  CDAI Score: 33.6 / 76   Tender (SQUIRES-28): 17 / 28   Swollen (SQUIRES-28): 15 / 28     Will attempt for Skyrizi and starting Methotrexate today.   We are going to start Methotrexate. She is to take 4 tablets ONE TIME WEEKLY. Folic acid is one tablet daily  Labs in 3 months.      Patient is aware of the risks benefits side effects and monitoring requirements of biologic therapy including but not limited to disseminated tuberculosis recurrent serious infections suppression of the immune system, injection site reactions.  We also reviewed the risks benefits and monitoring requirements of methotrexate therapy; not limited to weekly dosing, monitoring requirements, daily folic acid the avoidance of alcohol and the potential abortifacient and teratogenic nature.   Minor elevations in aminotransferases are common, but hepatic steatosis, fibrosis, and cirrhosis may infrequently occur. Routine use of daily folic acid supplementation, which is associated with a reduced risk of hepatic transaminase elevations. Pulmonary toxicity of MTX is seen with both high- and low-dose treatment .MTX is an  immunomodulatory but not significantly immunosuppressive agent; it is not associated with opportunistic infections in the overwhelming majority of patients. Hematologic toxicity is possible, but a more serious abnormality is the development of pancytopenia. Lymphoproliferative disorders occur with increased frequency in RA, independent of specific therapies, but MTX therapy may increase such risk        No follow-ups on file.        47min consultation with greater than 50% spent in counseling, chart review and coordination of care. All questions answered.

## 2022-08-26 ENCOUNTER — PATIENT MESSAGE (OUTPATIENT)
Dept: OBSTETRICS AND GYNECOLOGY | Facility: CLINIC | Age: 62
End: 2022-08-26
Payer: OTHER GOVERNMENT

## 2022-08-26 DIAGNOSIS — N95.2 VAGINAL ATROPHY: ICD-10-CM

## 2022-08-27 ENCOUNTER — TELEPHONE (OUTPATIENT)
Dept: RHEUMATOLOGY | Facility: CLINIC | Age: 62
End: 2022-08-27

## 2022-08-27 ENCOUNTER — PATIENT MESSAGE (OUTPATIENT)
Dept: RHEUMATOLOGY | Facility: CLINIC | Age: 62
End: 2022-08-27
Payer: OTHER GOVERNMENT

## 2022-08-27 NOTE — TELEPHONE ENCOUNTER
Please notify patient as we discussed insurance is going to request we try Stelara first.     I am canceling the Skyrizi order and sending in for stelara.   The only issue will be that Stelara does not have a Pen injector.     Dr. French.

## 2022-08-29 ENCOUNTER — OFFICE VISIT (OUTPATIENT)
Dept: OPTOMETRY | Facility: CLINIC | Age: 62
End: 2022-08-29
Payer: COMMERCIAL

## 2022-08-29 DIAGNOSIS — E11.9 TYPE 2 DIABETES MELLITUS WITHOUT RETINOPATHY: Primary | ICD-10-CM

## 2022-08-29 DIAGNOSIS — H52.13 MYOPIA WITH ASTIGMATISM AND PRESBYOPIA, BILATERAL: ICD-10-CM

## 2022-08-29 DIAGNOSIS — E11.36 CATARACT ASSOCIATED WITH TYPE 2 DIABETES MELLITUS: ICD-10-CM

## 2022-08-29 DIAGNOSIS — H52.4 MYOPIA WITH ASTIGMATISM AND PRESBYOPIA, BILATERAL: ICD-10-CM

## 2022-08-29 DIAGNOSIS — H43.393 VITREOUS FLOATERS, BILATERAL: ICD-10-CM

## 2022-08-29 DIAGNOSIS — H57.11 DISCOMFORT OF EYE, RIGHT: ICD-10-CM

## 2022-08-29 DIAGNOSIS — E11.9 TYPE 2 DIABETES MELLITUS WITHOUT COMPLICATION, WITHOUT LONG-TERM CURRENT USE OF INSULIN: ICD-10-CM

## 2022-08-29 DIAGNOSIS — H52.203 MYOPIA WITH ASTIGMATISM AND PRESBYOPIA, BILATERAL: ICD-10-CM

## 2022-08-29 DIAGNOSIS — Z13.5 GLAUCOMA SCREENING: ICD-10-CM

## 2022-08-29 PROCEDURE — 99999 PR PBB SHADOW E&M-EST. PATIENT-LVL IV: ICD-10-PCS | Mod: PBBFAC,,, | Performed by: OPTOMETRIST

## 2022-08-29 PROCEDURE — 92014 PR EYE EXAM, EST PATIENT,COMPREHESV: ICD-10-PCS | Mod: S$GLB,,, | Performed by: OPTOMETRIST

## 2022-08-29 PROCEDURE — 92015 DETERMINE REFRACTIVE STATE: CPT | Mod: S$GLB,,, | Performed by: OPTOMETRIST

## 2022-08-29 PROCEDURE — 92014 COMPRE OPH EXAM EST PT 1/>: CPT | Mod: S$GLB,,, | Performed by: OPTOMETRIST

## 2022-08-29 PROCEDURE — 92015 PR REFRACTION: ICD-10-PCS | Mod: S$GLB,,, | Performed by: OPTOMETRIST

## 2022-08-29 PROCEDURE — 99999 PR PBB SHADOW E&M-EST. PATIENT-LVL IV: CPT | Mod: PBBFAC,,, | Performed by: OPTOMETRIST

## 2022-08-29 RX ORDER — ESTRADIOL 0.1 MG/G
CREAM VAGINAL
Qty: 2 EACH | Refills: 1 | Status: SHIPPED | OUTPATIENT
Start: 2022-08-29 | End: 2022-10-26

## 2022-08-29 NOTE — PROGRESS NOTES
HPI    New pt here for annual diabetic exam. Last exam- 02/10/2021    Pt sts starting yesterday afternoon OD had a FBS and would not stop   watering. Pt sts she did not feel anything go into eye. Pt as rinsed eye 3   times with saline. Pt sts when she woke this am lashes were stuck together   and used warm compress to clean them. Pt sts after she wakes [facility] OU takes a   while to clear. Pt has floaters OU, no change. Pt denies flashes/pain. Pt   uses allergy gtt OU PRN.    Hemoglobin A1C       Date                     Value               Ref Range             Status                07/11/2022               6.1 (H)             4.0 - 5.6 %           Final                 07/20/2021               6.1 (H)             4.0 - 5.6 %           Final                   01/13/2021               6.0 (H)             4.0 - 5.6 %           Final                Last edited by Stacey Maldoando on 8/29/2022 10:15 AM.        ROS    Positive for: Eyes  Negative for: Constitutional, Gastrointestinal, Neurological, Skin,   Genitourinary, Musculoskeletal, HENT, Endocrine, Cardiovascular,   Respiratory, Psychiatric, Allergic/Imm, Heme/Lymph  Last edited by ARIAN Pedersen, OD on 8/29/2022 10:34 AM.        Assessment /Plan     For exam results, see Encounter Report.    Type 2 diabetes mellitus without retinopathy    Type 2 diabetes mellitus without complication, without long-term current use of insulin  -     Ambulatory referral/consult to Optometry    Cataract associated with type 2 diabetes mellitus    Glaucoma screening    Vitreous floaters, bilateral    Myopia with astigmatism and presbyopia, bilateral      1,2. No francois/ no csme, gave Diabetic Retinopathy info, advise tight control glucose, BP---Advise annual dilated fundus exam  3. Early NS changes, not vis sig for consult   4. Not suspect  5. RD precautions given and reviewed. Patient knows to call/ message if any further changes in symptoms occur.  6. Updated specs rx, gave copy, fill  prn  PAL vs FT     Mild irritation / conj injection OD---swabbed lid --no fb noted, no K stain   Message if not improving 24-48 hours     Discussed and educated patient on current findings /plan.  RTC 1 year, prn if any changes / issues

## 2022-08-29 NOTE — PROGRESS NOTES
HPI    New pt here for annual diabetic exam. Last exam- 02/10/2021    Pt sts starting yesterday afternoon OD had a FBS and would not stop   watering. Pt sts she did not feel anything go into eye. Pt as rinsed eye 3   times with saline. Pt sts when she woke this am lashes were stuck together   and used warm compress to clean them. Pt sts after she wakes [facility] OU takes a   while to clear. Pt has floaters OU, no change. Pt denies flashes/pain. Pt   uses allergy gtt OU PRN.    Hemoglobin A1C       Date                     Value               Ref Range             Status                07/11/2022               6.1 (H)             4.0 - 5.6 %           Final                 07/20/2021               6.1 (H)             4.0 - 5.6 %           Final                   01/13/2021               6.0 (H)             4.0 - 5.6 %           Final                Last edited by Stacey Maldonado on 8/29/2022 10:15 AM.        ROS    Positive for: Eyes  Negative for: Constitutional, Gastrointestinal, Neurological, Skin,   Genitourinary, Musculoskeletal, HENT, Endocrine, Cardiovascular,   Respiratory, Psychiatric, Allergic/Imm, Heme/Lymph  Last edited by ARIAN Pedersen, OD on 8/29/2022 10:34 AM.        Assessment /Plan     For exam results, see Encounter Report.    Type 2 diabetes mellitus without retinopathy    Type 2 diabetes mellitus without complication, without long-term current use of insulin  -     Ambulatory referral/consult to Optometry    Cataract associated with type 2 diabetes mellitus    Glaucoma screening    Vitreous floaters, bilateral    Discomfort of eye, right    Myopia with astigmatism and presbyopia, bilateral      1,2. No francois/ no csme, gave Diabetic Retinopathy info, advise tight control glucose, BP---Advise annual dilated fundus exam  3. Early NS changes, not vis sig for consult   4. Not suspect  5. RD precautions given and reviewed. Patient knows to call/ message if any further changes in symptoms occur.  6. Mild  irritation / conj injection OD---swabbed lid --no fb noted, no K stain   Message if not improving 24-48 hours   7. Updated specs rx, gave copy, fill prn  PAL vs FT       Discussed and educated patient on current findings /plan.  RTC 1 year, prn if any changes / issues

## 2022-08-29 NOTE — PATIENT INSTRUCTIONS
"DRY EYES -- BURNING OR AMARI SYMPTOMS:  Use Over The Counter artificial tears as needed for dry eye symptoms.   Some common brands include:  Systane, Optive, Refresh, and Thera-Tears.  These drops can be used as frequently as desired, but may be most helpful use during long periods of concentrated work.  For example, reading / working at the computer. Start with 3-4x per day.     Nighttime Ophthalmic gel or ointments are available: Refresh PM, Genteal, and Lacrilube.    Avoid drops that "get redness out" (Visine, Murine, Clear Eyes), as these may contain medication that could further irritate the eyes, especially with chronic use.    ALLERGY EYES -- ITCHING SYMPTOMS:  Over the counter medications include--Pataday, Zaditor, and Alaway.  Use as directed 1-2 drops daily for symptoms of itching / watering eyes.  These drops will not help for dry eye or exposure symptoms.    REDNESS RELIEF:  Lumify---is a good redness reliever that will not cause irritation if used chronically.        FLASHES / FLOATERS / POSTERIOR VITREOUS DETACHMENT    Call the clinic if you have any further changes in symptoms.  Including:  Increased numbers of floaters or flashing lights, dimness or darkness that moves through or stays constant in your vision, or any pain in the eye (s).    You may sometimes see small specks or clouds moving in your field of vision.  They are called FLOATERS.  You can often see them when looking at a plain background, like a blank wall or blue mike.  Floaters are actually tiny clumps of gel or cells inside the VITREOUS, the clear jelly-like fluid that fills the inside of your eye.    While these objects look like they are in front of your eye, they are actually floating inside.  What you see are the shadows they cast on the RETINA, the nerve layer at the back of the eye that senses light and allows you to see.      POSTERIOR VITREOUS DETACHMENT    The appearance of new floaters may be alarming.  If you suddenly " develop new floaters, you should contact your eye care professional  right away.    The retina can tear if the shrinking vitreous pulls away from the wall of the eye.  This sometimes causes a small amount of bleeding in the eye that may appear as new floaters.    A torn retina is always a serious problem, since it can lead to a retinal detachment.  You should see your eye care professional as soon as possible if:    even one new floater appears suddenly;  you see sudden flashes of light;  you notice other symptoms, like the loss of side vision, or a curtain closes down in your vision        POSTERIOR VITREOUS DETACHMENT is more common for people who:    are nearsighted;  have had cataract surgery;  have had YAG laser surgery of the eye;  have had inflammation inside the eye;  are over age 60.      While some floaters may remain visible, many of them will fade over time and become less noticeable.  Even if you've had some floaters for years, you should have your eyes checked as soon as possible if you notice new ones.    FLASHING LIGHTS    When the vitreous gel rubs or pulls on the retina, you may see what look like flashing lights or lightning streaks.  These flashes can appear off and on for several weeks or months.      Some people experience flashes of light that appear as jagged lines or heat waves in both eyes, lasting 10-20 minutes.  These flashes are caused by a spasm of blood vessels in the brain, which is called a migraine.    If a headache follows these flashes, it's called a migraine headache.  If   no headache occurs, these flashes are called Ophthalmic or Ocular Migraine.           DIABETES AND THE EYE / DIABETIC RETINOPATHY    Diabetic retinopathy is a condition occurring in persons with diabetes, which causes progressive damage to the retina, the light sensitive lining at the back of the eye. It is a serious sight-threatening complication of diabetes.    Diabetic retinopathy is the result of  damage to the tiny blood vessels that nourish the retina. They leak blood and other fluids that cause swelling of retinal tissue and clouding of vision. The condition usually affects both eyes. The longer a person has diabetes, the more likely they will develop diabetic retinopathy. If left untreated, diabetic retinopathy can cause blindness.  There are two basic types of diabetic retinopathy:    Background or nonproliferative diabetic retinopathy (NPDR)  Nonproliferative diabetic retinopathy (NPDR) is the earliest stage of diabetic retinopathy. With this condition, damaged blood vessels in the retina begin to leak extra fluid and small amounts of blood into the eye. Sometimes, deposits of cholesterol or other fats from the blood may leak into the retina. Many people with diabetes have mild NPDR, which usually does not affect their vision. However, if their vision is affected, it is the result of macular edema and macular ischemia.    If vision is affected due to macular changes, a consult with a Retina Specialist may be advised.  This is an ophthalmologist that treats retina conditions, including diabetic retinopathy.     Proliferative diabetic retinopathy (PDR)  Proliferative diabetic retinopathy (PDR) mainly occurs when many of the blood vessels in the retina close, preventing enough blood flow. In an attempt to supply blood to the area where the original vessels closed, the retina responds by growing new blood vessels. This is called neovascularization. However, these new blood vessels are abnormal and do not supply the retina with proper blood flow. The new vessels are also often accompanied by scar tissue that may cause the retina to wrinkle or detach. PDR may cause more severe vision loss than NPDR because it can affect both central and peripheral vision.     A patient diagnosed with proliferative diabetic eye disease will be referred to a retinal specialist for consultation.    Often there are no visual  symptoms in the early stages of diabetic retinopathy. That is why our eye care professionals recommend that everyone with diabetes have a comprehensive dilated eye examination once a year. Early detection and treatment can limit the potential for significant vision loss from diabetic retinopathy.

## 2022-08-30 ENCOUNTER — TELEPHONE (OUTPATIENT)
Dept: RHEUMATOLOGY | Facility: CLINIC | Age: 62
End: 2022-08-30
Payer: OTHER GOVERNMENT

## 2022-08-30 NOTE — TELEPHONE ENCOUNTER
----- Message from Justine Crooks, Patient Care Assistant sent at 8/30/2022  9:16 AM CDT -----  Regarding: advice  Contact: pt  Type: Needs Medical Advice  Who Called:  pt   Best Call Back Number: [phone] (home)     Additional Information: please call pt to advise. Thanks!     Patient asking for hours clinic open as she needs to drop off something for Dr. French. CG

## 2022-08-31 ENCOUNTER — PATIENT MESSAGE (OUTPATIENT)
Dept: RHEUMATOLOGY | Facility: CLINIC | Age: 62
End: 2022-08-31
Payer: OTHER GOVERNMENT

## 2022-09-01 NOTE — TELEPHONE ENCOUNTER
Called to check status of appeal, was informed that an AOR has to be signed by the patient before the appeal will be looked at, rep faxed AOR form to [facility], will contact patient as soon as I receive the form. Also requested an expedited appeal but Rep informed me that  does NOT offer expedited appeals and it could take up to 90 days for a decision.

## 2022-09-01 NOTE — TELEPHONE ENCOUNTER
Spoke with patient and she would like to try the Stelara syringe and not do the appeal for Skyrizi at this time. Messaged LEXII

## 2022-09-02 ENCOUNTER — TELEPHONE (OUTPATIENT)
Dept: RHEUMATOLOGY | Facility: CLINIC | Age: 62
End: 2022-09-02
Payer: OTHER GOVERNMENT

## 2022-09-02 DIAGNOSIS — L40.50 PSORIATIC ARTHRITIS: Primary | ICD-10-CM

## 2022-09-02 DIAGNOSIS — L40.9 PSORIASIS: ICD-10-CM

## 2022-09-02 RX ORDER — USTEKINUMAB 45 MG/.5ML
45 INJECTION, SOLUTION SUBCUTANEOUS
Qty: 1 EACH | Refills: 3 | Status: SHIPPED | OUTPATIENT
Start: 2022-09-02 | End: 2022-09-07

## 2022-09-02 RX ORDER — USTEKINUMAB 45 MG/.5ML
INJECTION, SOLUTION SUBCUTANEOUS
Qty: 2 EACH | Refills: 0 | Status: SHIPPED | OUTPATIENT
Start: 2022-09-02 | End: 2022-09-07

## 2022-09-02 NOTE — TELEPHONE ENCOUNTER
----- Message from Phyllis Nam PharmD sent at 9/1/2022  4:26 PM CDT -----  Regarding: Skyrizi/stelara  Good afternoon,    I called Ms. Patrick to inform her that I needed her signature on a consent letter so [employer] would review the appeal that was sent in for Skyrizi because they would not look at the appeal until the consent letter was sent in. She decided she would like to try the Stelara even though it only comes in a syringe. She does not want to hold off on therapy to wait for the appeal for Skyrizi due to [employer]rysten saying it could take up to 90 days for a decision and an expedited appeal is not an option.     Thank you for your time,  Phyllis Nam, Clinical Pharmacist

## 2022-09-07 ENCOUNTER — SPECIALTY PHARMACY (OUTPATIENT)
Dept: PHARMACY | Facility: CLINIC | Age: 62
End: 2022-09-07
Payer: OTHER GOVERNMENT

## 2022-09-07 ENCOUNTER — TELEPHONE (OUTPATIENT)
Dept: RHEUMATOLOGY | Facility: CLINIC | Age: 62
End: 2022-09-07
Payer: OTHER GOVERNMENT

## 2022-09-07 DIAGNOSIS — L40.50 PSORIATIC ARTHRITIS: Primary | ICD-10-CM

## 2022-09-07 DIAGNOSIS — L40.9 PSORIASIS: ICD-10-CM

## 2022-09-07 RX ORDER — USTEKINUMAB 45 MG/.5ML
INJECTION, SOLUTION SUBCUTANEOUS
Qty: 1 EACH | Refills: 5 | Status: SHIPPED | OUTPATIENT
Start: 2022-09-07 | End: 2022-12-19 | Stop reason: SDUPTHER

## 2022-09-07 NOTE — TELEPHONE ENCOUNTER
Silvia Rebollar, this is Phyllis Nam with [facility].  We are working on your prescription that your doctor has sent us. We will be working with your insurance to get this approved for you. We will be calling you along the way with updates on your medication.  If you have any questions, you can reach us at [phone].    Welcome call outcome: Patient/caregiver reached    PA not required due to patient already trying plan's pre-requisites per insurance rep.     [facility]. Patient must fill at [facility]     -Patient informed via voicemail  -Will transfer Rx and close out at [facility].

## 2022-09-07 NOTE — TELEPHONE ENCOUNTER
----- Message from Phyllis Nam PharmD sent at 9/6/2022 11:59 AM CDT -----  Regarding: FW: Skyrizi/stelara  Good morning,    I know you are super busy but I just wanted to give a heads up that from what I can tell [facility] has not received the new script for Stelara for Ms. Patrick as of yet.    Thank you for your time,  Phyllis Nam Clinical Pharmacist   ----- Message -----  From: Phyllis Nam PharmD  Sent: 9/2/2022  12:13 PM CDT  To: Winnie French DO  Subject: RE: Skyrizi/stelara                              Thank you    ----- Message -----  From: Winnie French DO  Sent: 9/2/2022  11:59 AM CDT  To: Phyllis Nam PharmD  Subject: RE: Skyrizi/stelara                              I switched the scripts. Thanks Dr. SEGUNDO  ----- Message -----  From: Phyllis Nam PharmD  Sent: 9/1/2022   4:30 PM CDT  To: Winnie French DO, Martin Jeanine M Staff  Subject: Skyrizi/stelara                                  Good afternoon,    I called Ms. Patrick to inform her that I needed her signature on a consent letter so [employer] would review the appeal that was sent in for Skyrizi because they would not look at the appeal until the consent letter was sent in. She decided she would like to try the Stelara even though it only comes in a syringe. She does not want to hold off on therapy to wait for the appeal for Skyrizi due to [employer]rysten saying it could take up to 90 days for a decision and an expedited appeal is not an option.     Thank you for your time,  Phyllis Nam Clinical Pharmacist

## 2022-09-08 ENCOUNTER — PATIENT MESSAGE (OUTPATIENT)
Dept: OBSTETRICS AND GYNECOLOGY | Facility: CLINIC | Age: 62
End: 2022-09-08
Payer: OTHER GOVERNMENT

## 2022-09-09 ENCOUNTER — PATIENT MESSAGE (OUTPATIENT)
Dept: FAMILY MEDICINE | Facility: CLINIC | Age: 62
End: 2022-09-09
Payer: OTHER GOVERNMENT

## 2022-09-29 ENCOUNTER — OFFICE VISIT (OUTPATIENT)
Dept: FAMILY MEDICINE | Facility: CLINIC | Age: 62
End: 2022-09-29
Payer: OTHER GOVERNMENT

## 2022-09-29 VITALS
OXYGEN SATURATION: 98 % | HEART RATE: 64 BPM | BODY MASS INDEX: 30.98 KG/M2 | DIASTOLIC BLOOD PRESSURE: 78 MMHG | WEIGHT: 186.19 LBS | SYSTOLIC BLOOD PRESSURE: 122 MMHG

## 2022-09-29 DIAGNOSIS — L40.50 PSORIATIC ARTHRITIS: ICD-10-CM

## 2022-09-29 DIAGNOSIS — Z87.891 HISTORY OF NICOTINE DEPENDENCE: ICD-10-CM

## 2022-09-29 DIAGNOSIS — M77.12 LEFT LATERAL EPICONDYLITIS: Primary | ICD-10-CM

## 2022-09-29 PROCEDURE — 99999 PR PBB SHADOW E&M-EST. PATIENT-LVL IV: ICD-10-PCS | Mod: PBBFAC,,, | Performed by: INTERNAL MEDICINE

## 2022-09-29 PROCEDURE — 99214 OFFICE O/P EST MOD 30 MIN: CPT | Mod: PBBFAC,PO | Performed by: INTERNAL MEDICINE

## 2022-09-29 PROCEDURE — 99999 PR PBB SHADOW E&M-EST. PATIENT-LVL IV: CPT | Mod: PBBFAC,,, | Performed by: INTERNAL MEDICINE

## 2022-09-29 PROCEDURE — 99213 PR OFFICE/OUTPT VISIT, EST, LEVL III, 20-29 MIN: ICD-10-PCS | Mod: S$PBB,,, | Performed by: INTERNAL MEDICINE

## 2022-09-29 PROCEDURE — 99213 OFFICE O/P EST LOW 20 MIN: CPT | Mod: S$PBB,,, | Performed by: INTERNAL MEDICINE

## 2022-09-29 NOTE — PROGRESS NOTES
Patient ID: Charla Patrick     Chief Complaint:   Chief Complaint   Patient presents with    Arm Injury     Left arm injury         HPI:  Patient complains of left upper extremity pain for the past 2 weeks.  It seems that she was D frosting a freezer and the process of unloading the frozen food to a separate cooler and then dragging it up into her house with her left arm she pulled something that really does appear to be tennis elbow in her left elbow.  She describes typical pain in the appropriate location over the lateral epicondyle and inability to grass things tightly and whole things up in that arm.  She does have a tennis elbow brace that she is wearing now and I do support that but I also want her to try over-the-counter Voltaren gel 2 to 3 times a day over that area, lifting things with her palm facing up and generally resting it over the next few weeks.  If it does not improve I will refer her to Orthopedics for a steroid injection but I want to put that off if I can.  Also she notices popping in her right ear and when I look at it she does have some lax pushed up against the tympanic membrane but her really could be her eustachian tube equilibrating pressure in her middle ear space.  It could be related to are dry whether an allergy flare so I would recommend that she try over-the-counter Flonase.  Of note, she is transitioning from Cosentyx to Stelara and she is still dosing so she cannot take any immunizations until November and she is aware of this.  She does have Left pinkie finger swelling that's due to Psoriatic Arthritis. She does consent to a low-dose CT of her chest due to her smoking history.  She did stop her simvastatin for a few days but it really did not impact her calf pain at night.  She really does get cramps when she is stretches out but they are fleeting so we will just monitor that for now.    Review of Systems   Constitutional: Negative.    HENT: Negative.     Eyes: Negative.     Respiratory: Negative.     Cardiovascular: Negative.    Gastrointestinal: Negative.    Endocrine: Negative.    Genitourinary: Negative.    Musculoskeletal:  Positive for arthralgias.   Skin: Negative.    Allergic/Immunologic: Negative.    Neurological: Negative.    Hematological: Negative.    Psychiatric/Behavioral: Negative.          Objective:      Physical Exam   Physical Exam  Vitals and nursing note reviewed.   Constitutional:       Appearance: Normal appearance. She is well-developed.   HENT:      Head: Normocephalic and atraumatic.      Nose: Nose normal.   Eyes:      Extraocular Movements: Extraocular movements intact.      Conjunctiva/sclera: Conjunctivae normal.      Pupils: Pupils are equal, round, and reactive to light.   Cardiovascular:      Rate and Rhythm: Normal rate and regular rhythm.      Pulses: Normal pulses.      Heart sounds: Normal heart sounds.   Pulmonary:      Effort: Pulmonary effort is normal.      Breath sounds: Normal breath sounds.   Abdominal:      General: Bowel sounds are normal.      Palpations: Abdomen is soft.   Musculoskeletal:         General: Normal range of motion.      Cervical back: Normal range of motion and neck supple.      Comments: Tenderness to Palpation over Left lateral epicondyle    Left pinkie finger slightly swollen at PIP joint   Skin:     General: Skin is warm and dry.      Capillary Refill: Capillary refill takes less than 2 seconds.   Neurological:      General: No focal deficit present.      Mental Status: She is alert and oriented to person, place, and time.   Psychiatric:         Mood and Affect: Mood normal.         Behavior: Behavior normal.         Thought Content: Thought content normal.         Judgment: Judgment normal.          Vitals:   Vitals:    09/29/22 0831   BP: 122/78   BP Location: Right arm   Patient Position: Sitting   Pulse: 64   SpO2: 98%   Weight: 84.5 kg (186 lb 2.9 oz)          Current Outpatient Medications:     ascorbic acid,  vitamin C, (VITAMIN C) 500 MG tablet, Take 500 mg by mouth once daily., Disp: , Rfl:     aspirin 81 MG Chew, Take 81 mg by mouth once daily., Disp: , Rfl:     blood sugar diagnostic Strp, To check BG 2 times daily, to use with insurance preferred meter, Disp: 200 strip, Rfl: 3    cholecalciferol, vitamin D3, (VITAMIN D3) 50 mcg (2,000 unit) Cap capsule, Take 2,000 Units by mouth., Disp: , Rfl:     clobetasol 0.05% (TEMOVATE) 0.05 % Oint, Clobetasol propionate 0.05% once daily at night for 4 weeks, then alternate nights for 4 weeks, and then twice weekly for 4 weeks, Disp: 45 g, Rfl: 2    estradioL (ESTRACE) 0.01 % (0.1 mg/gram) vaginal cream, Place 2 g vaginally every evening for 14 days, THEN 1 g every evening for 14 days, THEN 1 g twice a week. Continue 1g twice weekly for maintenance.., Disp: 2 each, Rfl: 1    famotidine (PEPCID) 40 MG tablet, Take 40 mg by mouth once daily., Disp: , Rfl:     folic acid (FOLVITE) 1 MG tablet, Take 1 tablet (1 mg total) by mouth once daily., Disp: 100 tablet, Rfl: 3    glimepiride (AMARYL) 1 MG tablet, Take 1 tablet (1 mg total) by mouth daily with breakfast., Disp: 90 tablet, Rfl: 1    methotrexate 2.5 MG Tab, Take 4 tablets (10 mg total) by mouth every 7 days., Disp: 48 tablet, Rfl: 1    pantoprazole (PROTONIX) 40 MG tablet, TAKE 1 TABLET DAILY, Disp: 90 tablet, Rfl: 3    simvastatin (ZOCOR) 40 MG tablet, TAKE 1 TABLET EVERY EVENING, Disp: 90 tablet, Rfl: 3    tiZANidine (ZANAFLEX) 4 MG tablet, Take 1 tablet (4 mg total) by mouth nightly as needed (spasm of muscle)., Disp: 90 tablet, Rfl: 3    turmeric root extract 500 mg Cap, Take 500 mg by mouth once daily at 6am., Disp: , Rfl:     ustekinumab (STELARA) 45 mg/0.5 mL Syrg syringe, Inject 45mg SQ on week, 0, 4 and then every 12 weeks., Disp: 1 each, Rfl: 5    vitamin B complex (SUPER B COMPLEX-B-12 ORAL), , Disp: , Rfl:     ALPRAZolam (XANAX) 0.5 MG tablet, Take 1 tablet (0.5 mg total) by mouth 2 (two) times daily as needed for  Anxiety., Disp: 30 tablet, Rfl: 0    loratadine (CLARITIN) 10 mg tablet, Take 1 tablet (10 mg total) by mouth once daily. (Patient not taking: Reported on 9/29/2022), Disp: 90 tablet, Rfl: 1    sulfacetamide sodium-sulfur 10-5 % (w/w) Clsr, Use to wash face daily (Patient not taking: Reported on 9/29/2022), Disp: 340 g, Rfl: 11   Assessment:       Patient Active Problem List    Diagnosis Date Noted    Screen for colon cancer 08/10/2021    Palpitations 02/11/2021    PVC (premature ventricular contraction) 02/11/2021    Diabetes mellitus, type 2     Allergic rhinitis     History of colon polyps     Hyperlipidemia 08/21/2020    Type 2 diabetes mellitus, without long-term current use of insulin 08/20/2020    Psoriatic arthritis 08/20/2020    Gastroesophageal reflux disease without esophagitis 08/20/2020          Plan:       Charla aPtrick  was seen today for follow-up and may need lab work.    Diagnoses and all orders for this visit:    Charla was seen today for arm injury.    Diagnoses and all orders for this visit:    Left lateral epicondylitis  Wear brace and try OTC Voltaren gel 2-3 times / day   Consider steroid shot   Please rest it     History of nicotine dependence  -     CT Chest Lung Screening Low Dose; Future    Psoriatic arthritis   Still dosing [place]

## 2022-10-05 DIAGNOSIS — L40.9 PSORIASIS: ICD-10-CM

## 2022-10-05 DIAGNOSIS — L40.50 PSORIATIC ARTHRITIS: ICD-10-CM

## 2022-10-07 RX ORDER — USTEKINUMAB 45 MG/.5ML
INJECTION, SOLUTION SUBCUTANEOUS
Qty: 1 ML | Refills: 12 | OUTPATIENT
Start: 2022-10-07

## 2022-10-08 ENCOUNTER — PATIENT MESSAGE (OUTPATIENT)
Dept: RHEUMATOLOGY | Facility: CLINIC | Age: 62
End: 2022-10-08
Payer: OTHER GOVERNMENT

## 2022-10-19 ENCOUNTER — PATIENT MESSAGE (OUTPATIENT)
Dept: RHEUMATOLOGY | Facility: CLINIC | Age: 62
End: 2022-10-19
Payer: OTHER GOVERNMENT

## 2022-11-22 ENCOUNTER — LAB VISIT (OUTPATIENT)
Dept: LAB | Facility: HOSPITAL | Age: 62
End: 2022-11-22
Attending: INTERNAL MEDICINE
Payer: OTHER GOVERNMENT

## 2022-11-22 DIAGNOSIS — L40.50 PSORIATIC ARTHRITIS: ICD-10-CM

## 2022-11-22 DIAGNOSIS — L40.9 PSORIASIS: ICD-10-CM

## 2022-11-22 DIAGNOSIS — D84.9 IMMUNOSUPPRESSION: ICD-10-CM

## 2022-11-22 LAB
ALBUMIN SERPL BCP-MCNC: 4 G/DL (ref 3.5–5.2)
ALP SERPL-CCNC: 80 U/L (ref 55–135)
ALT SERPL W/O P-5'-P-CCNC: 31 U/L (ref 10–44)
ANION GAP SERPL CALC-SCNC: 8 MMOL/L (ref 8–16)
AST SERPL-CCNC: 25 U/L (ref 10–40)
BASOPHILS # BLD AUTO: 0.04 K/UL (ref 0–0.2)
BASOPHILS NFR BLD: 0.7 % (ref 0–1.9)
BILIRUB SERPL-MCNC: 0.3 MG/DL (ref 0.1–1)
BUN SERPL-MCNC: 17 MG/DL (ref 8–23)
CALCIUM SERPL-MCNC: 9.9 MG/DL (ref 8.7–10.5)
CHLORIDE SERPL-SCNC: 105 MMOL/L (ref 95–110)
CO2 SERPL-SCNC: 26 MMOL/L (ref 23–29)
CREAT SERPL-MCNC: 1 MG/DL (ref 0.5–1.4)
CRP SERPL-MCNC: 6.1 MG/L (ref 0–8.2)
DIFFERENTIAL METHOD: ABNORMAL
EOSINOPHIL # BLD AUTO: 0.1 K/UL (ref 0–0.5)
EOSINOPHIL NFR BLD: 2.1 % (ref 0–8)
ERYTHROCYTE [DISTWIDTH] IN BLOOD BY AUTOMATED COUNT: 14.2 % (ref 11.5–14.5)
ERYTHROCYTE [SEDIMENTATION RATE] IN BLOOD BY PHOTOMETRIC METHOD: 32 MM/HR (ref 0–36)
EST. GFR  (NO RACE VARIABLE): >60 ML/MIN/1.73 M^2
GLUCOSE SERPL-MCNC: 154 MG/DL (ref 70–110)
HCT VFR BLD AUTO: 42.8 % (ref 37–48.5)
HGB BLD-MCNC: 13.4 G/DL (ref 12–16)
IMM GRANULOCYTES # BLD AUTO: 0.02 K/UL (ref 0–0.04)
IMM GRANULOCYTES NFR BLD AUTO: 0.4 % (ref 0–0.5)
LYMPHOCYTES # BLD AUTO: 1.7 K/UL (ref 1–4.8)
LYMPHOCYTES NFR BLD: 29.2 % (ref 18–48)
MCH RBC QN AUTO: 28.3 PG (ref 27–31)
MCHC RBC AUTO-ENTMCNC: 31.3 G/DL (ref 32–36)
MCV RBC AUTO: 91 FL (ref 82–98)
MONOCYTES # BLD AUTO: 0.4 K/UL (ref 0.3–1)
MONOCYTES NFR BLD: 7.4 % (ref 4–15)
NEUTROPHILS # BLD AUTO: 3.4 K/UL (ref 1.8–7.7)
NEUTROPHILS NFR BLD: 60.2 % (ref 38–73)
NRBC BLD-RTO: 0 /100 WBC
PLATELET # BLD AUTO: 240 K/UL (ref 150–450)
PMV BLD AUTO: 10.9 FL (ref 9.2–12.9)
POTASSIUM SERPL-SCNC: 4.5 MMOL/L (ref 3.5–5.1)
PROT SERPL-MCNC: 7.1 G/DL (ref 6–8.4)
RBC # BLD AUTO: 4.73 M/UL (ref 4–5.4)
SODIUM SERPL-SCNC: 139 MMOL/L (ref 136–145)
WBC # BLD AUTO: 5.68 K/UL (ref 3.9–12.7)

## 2022-11-22 PROCEDURE — 80053 COMPREHEN METABOLIC PANEL: CPT | Performed by: INTERNAL MEDICINE

## 2022-11-22 PROCEDURE — 86140 C-REACTIVE PROTEIN: CPT | Performed by: INTERNAL MEDICINE

## 2022-11-22 PROCEDURE — 85652 RBC SED RATE AUTOMATED: CPT | Performed by: INTERNAL MEDICINE

## 2022-11-22 PROCEDURE — 36415 COLL VENOUS BLD VENIPUNCTURE: CPT | Mod: PO | Performed by: INTERNAL MEDICINE

## 2022-11-22 PROCEDURE — 85025 COMPLETE CBC W/AUTO DIFF WBC: CPT | Performed by: INTERNAL MEDICINE

## 2022-12-19 ENCOUNTER — OFFICE VISIT (OUTPATIENT)
Dept: RHEUMATOLOGY | Facility: CLINIC | Age: 62
End: 2022-12-19
Payer: OTHER GOVERNMENT

## 2022-12-19 VITALS
DIASTOLIC BLOOD PRESSURE: 78 MMHG | HEIGHT: 65 IN | BODY MASS INDEX: 30.32 KG/M2 | WEIGHT: 182 LBS | HEART RATE: 71 BPM | SYSTOLIC BLOOD PRESSURE: 127 MMHG

## 2022-12-19 DIAGNOSIS — L40.9 PSORIASIS: ICD-10-CM

## 2022-12-19 DIAGNOSIS — D84.9 IMMUNOSUPPRESSION: Primary | ICD-10-CM

## 2022-12-19 DIAGNOSIS — L40.50 PSORIATIC ARTHRITIS: ICD-10-CM

## 2022-12-19 PROCEDURE — 20610 PR DRAIN/INJECT LARGE JOINT/BURSA: ICD-10-PCS | Mod: S$PBB,RT,, | Performed by: INTERNAL MEDICINE

## 2022-12-19 PROCEDURE — 99214 PR OFFICE/OUTPT VISIT, EST, LEVL IV, 30-39 MIN: ICD-10-PCS | Mod: 25,S$PBB,, | Performed by: INTERNAL MEDICINE

## 2022-12-19 PROCEDURE — 99999 PR PBB SHADOW E&M-EST. PATIENT-LVL III: ICD-10-PCS | Mod: PBBFAC,,, | Performed by: INTERNAL MEDICINE

## 2022-12-19 PROCEDURE — 20610 DRAIN/INJ JOINT/BURSA W/O US: CPT | Mod: S$PBB,RT,, | Performed by: INTERNAL MEDICINE

## 2022-12-19 PROCEDURE — 99213 OFFICE O/P EST LOW 20 MIN: CPT | Mod: PBBFAC,PN,25 | Performed by: INTERNAL MEDICINE

## 2022-12-19 PROCEDURE — 20610 DRAIN/INJ JOINT/BURSA W/O US: CPT | Mod: PBBFAC,PN | Performed by: INTERNAL MEDICINE

## 2022-12-19 PROCEDURE — 99999 PR PBB SHADOW E&M-EST. PATIENT-LVL III: CPT | Mod: PBBFAC,,, | Performed by: INTERNAL MEDICINE

## 2022-12-19 PROCEDURE — 99214 OFFICE O/P EST MOD 30 MIN: CPT | Mod: 25,S$PBB,, | Performed by: INTERNAL MEDICINE

## 2022-12-19 PROCEDURE — 20611 DRAIN/INJ JOINT/BURSA W/US: CPT | Mod: PBBFAC,PN | Performed by: INTERNAL MEDICINE

## 2022-12-19 RX ORDER — USTEKINUMAB 45 MG/.5ML
INJECTION, SOLUTION SUBCUTANEOUS
Qty: 1 EACH | Refills: 5 | Status: SHIPPED | OUTPATIENT
Start: 2022-12-19 | End: 2023-10-25

## 2022-12-19 RX ORDER — METHOTREXATE 2.5 MG/1
10 TABLET ORAL
Qty: 48 TABLET | Refills: 1 | Status: SHIPPED | OUTPATIENT
Start: 2022-12-19 | End: 2023-07-13

## 2022-12-19 RX ORDER — METHYLPREDNISOLONE ACETATE 40 MG/ML
40 INJECTION, SUSPENSION INTRA-ARTICULAR; INTRALESIONAL; INTRAMUSCULAR; SOFT TISSUE
Status: DISCONTINUED | OUTPATIENT
Start: 2022-12-19 | End: 2022-12-19 | Stop reason: HOSPADM

## 2022-12-19 RX ORDER — FOLIC ACID 1 MG/1
1 TABLET ORAL DAILY
Qty: 100 TABLET | Refills: 3 | Status: SHIPPED | OUTPATIENT
Start: 2022-12-19 | End: 2023-08-16 | Stop reason: SDUPTHER

## 2022-12-19 RX ORDER — TIZANIDINE 4 MG/1
4 TABLET ORAL NIGHTLY PRN
Qty: 90 TABLET | Refills: 3 | Status: SHIPPED | OUTPATIENT
Start: 2022-12-19 | End: 2023-12-20 | Stop reason: SDUPTHER

## 2022-12-19 RX ADMIN — METHYLPREDNISOLONE ACETATE 40 MG: 40 INJECTION, SUSPENSION INTRA-ARTICULAR; INTRALESIONAL; INTRAMUSCULAR; SOFT TISSUE at 02:12

## 2022-12-19 ASSESSMENT — ROUTINE ASSESSMENT OF PATIENT INDEX DATA (RAPID3)
PSYCHOLOGICAL DISTRESS SCORE: 2.2
FATIGUE SCORE: 1.1
PATIENT GLOBAL ASSESSMENT SCORE: 3
MDHAQ FUNCTION SCORE: 0.4
TOTAL RAPID3 SCORE: 2.78
PAIN SCORE: 4

## 2022-12-19 NOTE — PROGRESS NOTES
Subjective:          Chief Complaint: Charla Patrick is a 62 y.o. female who had no chief complaint listed for this encounter.    HPI:  Patient is a 62-year-old female previously seen [place] for psoriatic arthritis moved from [place] patient was seen in the [facility] in [place] her last office visit being 01/12/2021.  Remote diagnosis 2006 - acute psoriasis. Joints began shortly after the skin,  joint pain was located in the fingers, hips, and both feet (chronic plantar fasciitis) .      Aggravating factors with the morning evening and during activity.    Alleviating factors were exercise and medications associated factors joint stiffness, fatigue, lack of sleep, joint pain, joint swelling, abdominal pain and night sweats.      She also notes dry eyes and dry mouth psoriasis family history of other autoimmune conditions.    Patient denies ulcerative colitis, crohns or other colitis. Only IBS to date.   Having issues with continued GI complaints and c/o depression more difficult to manage with Otezla so discontinued.   Small recurrent scalp lesion is most prevalent historically.   Started Cosentyx (8/2021)  Tolerating. Completed loading 5 doses and 8 months total thus far. Patient is at 8 months Consetyx 150mg every 30 day,  as effective as Otezla but no GI ASE. Now at nearly 1 year adjust to Cosentyx 300mg montly and Rapid 3 worsening. Patient notes pain 6/10 right hip, right knee and hands.     Updated Quantiferon gold 8/2021 negative.   Pain in knees (describes burning ) right knee is worsening with pop fossa pain since last visit still painful,  no prior hx of knee pain. Better with activity. No swelling noting. No buckling.    Left elbow with injury still hurting.   Right lateral hip waking with pain. Typically it will stop or be brief now seems present every morning for over 30min.   Rates all this pain 4/10 so when comparing to previous visit. Seems to be doing well with  Stelara.   Morning stiffness: 15-20min but ++gelling phenomena.     Current medication:  Stelara 45mg Q 12 w (start 9/2022)   MTX 4 tabs weekly  Folic acid 1 mg daily  tizandine 4mg tablets.   Tylenol PRN    Previous medications trialed:    Methotrexate began 2007.  Discontinued methotrexate 05/2017 no ASE or LAE.   Enbrel without adverse side effects no mention exact type.  Humira was loss of back efficacy over time,  Cimzia no benefit.   Otezla 30 mg once daily (6913-2367)- losing efficacy  Celebrex but developed a  gastritis despite this.  Cosentyx 300mg monthly (DC 9/2022) loss of efficacy  No personal hx of Cancer  No DVT/seizure or stroke, startin estradiol     Patient also has osteopenia her last DEXA 2018 was osteopenia but her FRAX score was below recommended bisphosphonate or other therapeutic ranges so she was continued on regular weight-bearing calcium and vitamin-D and repeat DXA is every 2 years    [facility] 7 10/23/2019 ultrasound bilateral hands  Left hand visualized portions of the bone muscle tendon joints and median nerve are normal appearing particular evaluation at the MCP 2 3 and 5 transverse views also obtained no evidence of any erosions power Doppler use for synovitis and tenosynovitis which was also negative.  I have a TB from 03/23/2020 as a QuantiFERON gold that is negative      REVIEW OF SYSTEMS:    Review of Systems   Constitutional:  Negative for fever, malaise/fatigue and weight loss.   HENT:  Negative for sore throat.    Eyes:  Negative for double vision, photophobia and redness.   Respiratory:  Negative for cough, shortness of breath and wheezing.    Cardiovascular:  Negative for chest pain, palpitations and orthopnea.   Gastrointestinal:  Negative for abdominal pain, constipation and diarrhea.   Genitourinary:  Negative for dysuria, hematuria and urgency.   Musculoskeletal:  Positive for joint pain. Negative for back pain and myalgias.   Skin:  Negative for rash.   Neurological:   Negative for dizziness, tingling, focal weakness and headaches.   Endo/Heme/Allergies:  Does not bruise/bleed easily.   Psychiatric/Behavioral:  Negative for depression, hallucinations and suicidal ideas.              Objective:            Past Medical History:   Diagnosis Date    Allergic rhinitis     Diabetes mellitus, type 2     History of colon polyps     Hyperlipidemia     Psoriatic arthritis      Family History   Problem Relation Age of Onset    Heart disease Mother     Diabetes Mother     Brain cancer Father         Glioblastoma     Psoriasis Father     Thyroid disease Sister         hypothyroidism     Diabetes Sister     Psoriasis Sister     Cerebral palsy Daughter     Heart disease Daughter     Diabetes Daughter     No Known Problems Son     Stroke Maternal Grandmother     Diabetes Maternal Grandmother     Glaucoma Maternal Grandmother     Diabetes Maternal Grandfather     Ovarian cancer Paternal Grandmother     Psoriasis Paternal Grandmother     Pancreatic cancer Paternal Grandfather     No Known Problems Sister     Psoriasis Brother     Macular degeneration Neg Hx     Eczema Neg Hx     Lupus Neg Hx     Melanoma Neg Hx      Social History     Tobacco Use    Smoking status: Former     Packs/day: 1.00     Years: 40.00     Pack years: 40.00     Types: Cigarettes     Quit date: 2/14/2019     Years since quitting: 3.8    Smokeless tobacco: Never    Tobacco comments:     former smoker   Substance Use Topics    Alcohol use: Never    Drug use: Never         Current Outpatient Medications on File Prior to Visit   Medication Sig Dispense Refill    ascorbic acid, vitamin C, (VITAMIN C) 500 MG tablet Take 500 mg by mouth once daily.      aspirin 81 MG Chew Take 81 mg by mouth once daily.      blood sugar diagnostic Strp To check BG 2 times daily, to use with insurance preferred meter 200 strip 3    cholecalciferol, vitamin D3, (VITAMIN D3) 50 mcg (2,000 unit) Cap capsule Take 2,000 Units by mouth.      clobetasol  0.05% (TEMOVATE) 0.05 % Oint Clobetasol propionate 0.05% once daily at night for 4 weeks, then alternate nights for 4 weeks, and then twice weekly for 4 weeks 45 g 2    famotidine (PEPCID) 40 MG tablet Take 40 mg by mouth once daily.      folic acid (FOLVITE) 1 MG tablet Take 1 tablet (1 mg total) by mouth once daily. 100 tablet 3    glimepiride (AMARYL) 1 MG tablet Take 1 tablet (1 mg total) by mouth daily with breakfast. 90 tablet 1    loratadine (CLARITIN) 10 mg tablet TAKE 1 TABLET DAILY 90 tablet 3    methotrexate 2.5 MG Tab Take 4 tablets (10 mg total) by mouth every 7 days. 48 tablet 1    pantoprazole (PROTONIX) 40 MG tablet TAKE 1 TABLET DAILY 90 tablet 3    simvastatin (ZOCOR) 40 MG tablet TAKE 1 TABLET EVERY EVENING 90 tablet 3    sulfacetamide sodium-sulfur 10-5 % (w/w) Clsr Use to wash face daily 340 g 11    tiZANidine (ZANAFLEX) 4 MG tablet Take 1 tablet (4 mg total) by mouth nightly as needed (spasm of muscle). 90 tablet 3    turmeric root extract 500 mg Cap Take 500 mg by mouth once daily at 6am.      ustekinumab (STELARA) 45 mg/0.5 mL Syrg syringe Inject 45mg SQ on week, 0, 4 and then every 12 weeks. 1 each 5    vitamin B complex (SUPER B COMPLEX-B-12 ORAL)       ALPRAZolam (XANAX) 0.5 MG tablet Take 1 tablet (0.5 mg total) by mouth 2 (two) times daily as needed for Anxiety. 30 tablet 0    estradioL (ESTRACE) 0.01 % (0.1 mg/gram) vaginal cream Place 2 g vaginally every evening for 14 days, THEN 1 g every evening for 14 days, THEN 1 g twice a week. Continue 1g twice weekly for maintenance.. 2 each 1     No current facility-administered medications on file prior to visit.       Vitals:    12/19/22 1352   BP: 127/78   Pulse: 71       Physical Exam:    Physical Exam  Constitutional:       Appearance: She is well-developed.   Eyes:      General: Lids are normal.   Skin:     Comments: Flare psoriasis EAC bilaterally with prominent scale and erythema.    Neurological:      Mental Status: She is oriented to  person, place, and time.   Psychiatric:         Behavior: Behavior normal.         Thought Content: Thought content normal.   There is currently no information documented on the homunculus. Go to the Rheumatology activity and complete the homunculus joint exam.          Rapid 3: 4.78. (8/2022)           Assessment:       Encounter Diagnoses   Name Primary?    Psoriatic arthritis     Psoriasis             Plan:        Immunosuppression  -     CBC Auto Differential; Standing; Expected date: 12/19/2022  -     Comprehensive Metabolic Panel; Standing; Expected date: 12/19/2022  -     Sedimentation rate; Standing; Expected date: 12/19/2022  -     C-Reactive Protein; Standing; Expected date: 12/19/2022    Psoriatic arthritis  -     ustekinumab (STELARA) 45 mg/0.5 mL Syrg syringe; Inject 45mg SQ on week, 0, 4 and then every 12 weeks.  Dispense: 1 each; Refill: 5  -     methotrexate 2.5 MG Tab; Take 4 tablets (10 mg total) by mouth every 7 days.  Dispense: 48 tablet; Refill: 1  -     folic acid (FOLVITE) 1 MG tablet; Take 1 tablet (1 mg total) by mouth once daily.  Dispense: 100 tablet; Refill: 3  -     Large Joint Aspiration/Injection: R greater trochanteric bursa  -     CBC Auto Differential; Standing; Expected date: 12/19/2022  -     Comprehensive Metabolic Panel; Standing; Expected date: 12/19/2022  -     Sedimentation rate; Standing; Expected date: 12/19/2022  -     C-Reactive Protein; Standing; Expected date: 12/19/2022    Psoriasis  -     ustekinumab (STELARA) 45 mg/0.5 mL Syrg syringe; Inject 45mg SQ on week, 0, 4 and then every 12 weeks.  Dispense: 1 each; Refill: 5  -     methotrexate 2.5 MG Tab; Take 4 tablets (10 mg total) by mouth every 7 days.  Dispense: 48 tablet; Refill: 1  -     folic acid (FOLVITE) 1 MG tablet; Take 1 tablet (1 mg total) by mouth once daily.  Dispense: 100 tablet; Refill: 3  -     Large Joint Aspiration/Injection: R greater trochanteric bursa  -     CBC Auto Differential; Standing; Expected  date: 12/19/2022  -     Comprehensive Metabolic Panel; Standing; Expected date: 12/19/2022  -     Sedimentation rate; Standing; Expected date: 12/19/2022  -     C-Reactive Protein; Standing; Expected date: 12/19/2022    Other orders  -     tiZANidine (ZANAFLEX) 4 MG tablet; Take 1 tablet (4 mg total) by mouth nightly as needed (spasm of muscle).  Dispense: 90 tablet; Refill: 3           Patient with long standing PsA:    -Started Stelara 9/2022 [facility]   [facility] no [facility] needed per [facility] 9/2022 [facility] which I did on 9/7/2022 with 5 refills. Patient is due 1/4/2023.     Enbrel without adverse side effects no mention exact type.  Humira was loss of back efficacy over time,  Cimzia no benefit.   Otezla 30 mg once daily (4285-0321)- losing efficacy  Celebrex but developed a  gastritis despite this.  Cosentyx 300mg monthly still with rising Rapid 3 and skin changes.         Continue Stelara  Continue Methotrexate. She is to take 4 tablets ONE TIME WEEKLY. Folic acid is one tablet daily  Labs in 3 months.        Follow up in about 4 months (around 4/19/2023).        35 min consultation with greater than 50% spent in counseling, chart review and coordination of care. All questions answered.  + right GTB injection

## 2022-12-19 NOTE — PROCEDURES
Large Joint Aspiration/Injection: R greater trochanteric bursa    Date/Time: 12/19/2022 2:00 PM  Performed by: Winnie French DO  Authorized by: Winnie French, DO     Consent Done?:  Yes (Verbal)  Indications:  Arthritis  Site marked: the procedure site was marked    Timeout: prior to procedure the correct patient, procedure, and site was verified    Prep: patient was prepped and draped in usual sterile fashion      Local anesthesia used?: Yes    Anesthesia:  Local infiltration  Local anesthetic:  Lidocaine 1% without epinephrine  Anesthetic total (ml):  3      Details:  Needle Size:  25 G  Ultrasonic Guidance for needle placement?: Yes    Images are saved and documented.  Approach:  Lateral  Location:  Hip  Site:  R greater trochanteric bursa  Medications:  40 mg methylPREDNISolone acetate 40 mg/mL  Patient tolerance:  Patient tolerated the procedure well with no immediate complications     Patient informed of the risks, benefits, side effects of corticosteroid injections including but not limited to skin hypopigmentation, atrophy, ineffectiveness and infection.

## 2022-12-27 ENCOUNTER — PATIENT MESSAGE (OUTPATIENT)
Dept: FAMILY MEDICINE | Facility: CLINIC | Age: 62
End: 2022-12-27
Payer: OTHER GOVERNMENT

## 2022-12-28 ENCOUNTER — PATIENT MESSAGE (OUTPATIENT)
Dept: FAMILY MEDICINE | Facility: CLINIC | Age: 62
End: 2022-12-28
Payer: OTHER GOVERNMENT

## 2023-01-12 ENCOUNTER — LAB VISIT (OUTPATIENT)
Dept: LAB | Facility: HOSPITAL | Age: 63
End: 2023-01-12
Attending: INTERNAL MEDICINE
Payer: OTHER GOVERNMENT

## 2023-01-12 DIAGNOSIS — E11.9 TYPE 2 DIABETES MELLITUS WITHOUT COMPLICATION, WITHOUT LONG-TERM CURRENT USE OF INSULIN: ICD-10-CM

## 2023-01-12 PROCEDURE — 36415 COLL VENOUS BLD VENIPUNCTURE: CPT | Mod: PO | Performed by: INTERNAL MEDICINE

## 2023-01-12 PROCEDURE — 83036 HEMOGLOBIN GLYCOSYLATED A1C: CPT | Performed by: INTERNAL MEDICINE

## 2023-01-13 LAB
ESTIMATED AVG GLUCOSE: 137 MG/DL (ref 68–131)
HBA1C MFR BLD: 6.4 % (ref 4–5.6)

## 2023-01-18 ENCOUNTER — OFFICE VISIT (OUTPATIENT)
Dept: FAMILY MEDICINE | Facility: CLINIC | Age: 63
End: 2023-01-18
Payer: OTHER GOVERNMENT

## 2023-01-18 VITALS
DIASTOLIC BLOOD PRESSURE: 72 MMHG | BODY MASS INDEX: 30.84 KG/M2 | WEIGHT: 185.31 LBS | OXYGEN SATURATION: 97 % | HEART RATE: 59 BPM | SYSTOLIC BLOOD PRESSURE: 114 MMHG

## 2023-01-18 DIAGNOSIS — L40.9 SCALP PSORIASIS: Primary | ICD-10-CM

## 2023-01-18 DIAGNOSIS — K21.9 GASTROESOPHAGEAL REFLUX DISEASE WITHOUT ESOPHAGITIS: ICD-10-CM

## 2023-01-18 DIAGNOSIS — E66.09 CLASS 1 OBESITY DUE TO EXCESS CALORIES WITH SERIOUS COMORBIDITY AND BODY MASS INDEX (BMI) OF 30.0 TO 30.9 IN ADULT: ICD-10-CM

## 2023-01-18 DIAGNOSIS — L40.50 PSORIATIC ARTHRITIS: ICD-10-CM

## 2023-01-18 DIAGNOSIS — E78.49 OTHER HYPERLIPIDEMIA: ICD-10-CM

## 2023-01-18 DIAGNOSIS — E11.9 TYPE 2 DIABETES MELLITUS WITHOUT COMPLICATION, WITHOUT LONG-TERM CURRENT USE OF INSULIN: ICD-10-CM

## 2023-01-18 DIAGNOSIS — H90.6 MIXED CONDUCTIVE AND SENSORINEURAL HEARING LOSS OF BOTH EARS: ICD-10-CM

## 2023-01-18 PROBLEM — E66.811 CLASS 1 OBESITY DUE TO EXCESS CALORIES WITH SERIOUS COMORBIDITY AND BODY MASS INDEX (BMI) OF 30.0 TO 30.9 IN ADULT: Status: ACTIVE | Noted: 2023-01-18

## 2023-01-18 PROCEDURE — 99214 PR OFFICE/OUTPT VISIT, EST, LEVL IV, 30-39 MIN: ICD-10-PCS | Mod: S$PBB,,, | Performed by: INTERNAL MEDICINE

## 2023-01-18 PROCEDURE — 99999 PR PBB SHADOW E&M-EST. PATIENT-LVL IV: CPT | Mod: PBBFAC,,, | Performed by: INTERNAL MEDICINE

## 2023-01-18 PROCEDURE — 99214 OFFICE O/P EST MOD 30 MIN: CPT | Mod: PBBFAC,PO | Performed by: INTERNAL MEDICINE

## 2023-01-18 PROCEDURE — 99999 PR PBB SHADOW E&M-EST. PATIENT-LVL IV: ICD-10-PCS | Mod: PBBFAC,,, | Performed by: INTERNAL MEDICINE

## 2023-01-18 PROCEDURE — 99214 OFFICE O/P EST MOD 30 MIN: CPT | Mod: S$PBB,,, | Performed by: INTERNAL MEDICINE

## 2023-01-18 RX ORDER — DULAGLUTIDE 0.75 MG/.5ML
0.75 INJECTION, SOLUTION SUBCUTANEOUS
Qty: 12 PEN | Refills: 3 | Status: SHIPPED | OUTPATIENT
Start: 2023-01-18 | End: 2023-06-30 | Stop reason: DRUGHIGH

## 2023-01-18 RX ORDER — FLUOCINONIDE 0.5 MG/G
CREAM TOPICAL 2 TIMES DAILY
Qty: 120 G | Refills: 3 | Status: SHIPPED | OUTPATIENT
Start: 2023-01-18

## 2023-01-18 NOTE — PROGRESS NOTES
Patient ID: Charla Patrick     Chief Complaint:   Chief Complaint   Patient presents with    Diabetes     FOLLOW UP        HPI:  Routine six-month follow-up for labs and I am happy to report that her A1c is still controlled at 6.4 but she is a bit concerned because it is always been 6.0-6.1 for many years.  She requests transitioning from glimepiride to a GLP 1 agonist and I think Trulicity is preferred on her insurance plan so we will start that with instructions to stop the glimepiride once she starts Trulicity.  She should notice anywhere from 10-20 lb weight loss over the next few months and we will repeat labs at a later date.  She is due for low-dose CT scan of her lungs which I will schedule.  She politely declines a COVID booster this time and if she would like a Tdap she can get it from the pharmacy.  Thankfully her tennis elbow has resolved.  She is concerned that she is losing her hearing and I am going to refer her to audiology to get that evaluated.  The Stelara is doing very well to control the arthritis but her scalp psoriasis is not too well controlled some going to prescribe fluocinonide topical cream to help treat that.    Review of Systems   Constitutional: Negative.    HENT:  Positive for hearing loss.    Eyes: Negative.    Respiratory: Negative.     Cardiovascular: Negative.    Gastrointestinal: Negative.    Endocrine: Negative.    Genitourinary: Negative.    Musculoskeletal: Negative.    Skin: Negative.    Allergic/Immunologic: Negative.    Neurological: Negative.    Hematological: Negative.    Psychiatric/Behavioral: Negative.          Objective:      Physical Exam   Physical Exam  Vitals and nursing note reviewed.   Constitutional:       Appearance: Normal appearance. She is well-developed. She is obese.   HENT:      Head: Normocephalic and atraumatic.      Nose: Nose normal.      Mouth/Throat:      Mouth: Mucous membranes are moist.   Eyes:      Extraocular Movements: Extraocular movements  intact.      Conjunctiva/sclera: Conjunctivae normal.      Pupils: Pupils are equal, round, and reactive to light.   Cardiovascular:      Rate and Rhythm: Normal rate and regular rhythm.      Pulses: Normal pulses.      Heart sounds: Normal heart sounds.   Pulmonary:      Effort: Pulmonary effort is normal.      Breath sounds: Normal breath sounds.   Abdominal:      General: Bowel sounds are normal.      Palpations: Abdomen is soft.   Musculoskeletal:         General: Normal range of motion.      Cervical back: Normal range of motion and neck supple.   Skin:     General: Skin is warm and dry.      Capillary Refill: Capillary refill takes less than 2 seconds.   Neurological:      General: No focal deficit present.      Mental Status: She is alert and oriented to person, place, and time.   Psychiatric:         Mood and Affect: Mood normal.         Behavior: Behavior normal.         Thought Content: Thought content normal.         Judgment: Judgment normal.          Vitals:   Vitals:    01/18/23 1104   BP: 114/72   BP Location: Right arm   Patient Position: Sitting   Pulse: (!) 59   SpO2: 97%   Weight: 84.1 kg (185 lb 4.8 oz)          Current Outpatient Medications:     ascorbic acid, vitamin C, (VITAMIN C) 500 MG tablet, Take 500 mg by mouth once daily., Disp: , Rfl:     aspirin 81 MG Chew, Take 81 mg by mouth once daily., Disp: , Rfl:     blood sugar diagnostic Strp, To check BG 2 times daily, to use with insurance preferred meter, Disp: 200 strip, Rfl: 3    cholecalciferol, vitamin D3, (VITAMIN D3) 50 mcg (2,000 unit) Cap capsule, Take 2,000 Units by mouth., Disp: , Rfl:     clobetasol 0.05% (TEMOVATE) 0.05 % Oint, Clobetasol propionate 0.05% once daily at night for 4 weeks, then alternate nights for 4 weeks, and then twice weekly for 4 weeks, Disp: 45 g, Rfl: 2    famotidine (PEPCID) 40 MG tablet, Take 40 mg by mouth once daily., Disp: , Rfl:     folic acid (FOLVITE) 1 MG tablet, Take 1 tablet (1 mg total) by mouth  once daily., Disp: 100 tablet, Rfl: 3    glimepiride (AMARYL) 1 MG tablet, Take 1 tablet (1 mg total) by mouth daily with breakfast., Disp: 90 tablet, Rfl: 1    loratadine (CLARITIN) 10 mg tablet, TAKE 1 TABLET DAILY, Disp: 90 tablet, Rfl: 3    methotrexate 2.5 MG Tab, Take 4 tablets (10 mg total) by mouth every 7 days., Disp: 48 tablet, Rfl: 1    pantoprazole (PROTONIX) 40 MG tablet, TAKE 1 TABLET DAILY, Disp: 90 tablet, Rfl: 3    simvastatin (ZOCOR) 40 MG tablet, TAKE 1 TABLET EVERY EVENING, Disp: 90 tablet, Rfl: 3    sulfacetamide sodium-sulfur 10-5 % (w/w) Clsr, Use to wash face daily, Disp: 340 g, Rfl: 11    tiZANidine (ZANAFLEX) 4 MG tablet, Take 1 tablet (4 mg total) by mouth nightly as needed (spasm of muscle)., Disp: 90 tablet, Rfl: 3    turmeric root extract 500 mg Cap, Take 500 mg by mouth once daily at 6am., Disp: , Rfl:     ustekinumab (STELARA) 45 mg/0.5 mL Syrg syringe, Inject 45mg SQ on week, 0, 4 and then every 12 weeks., Disp: 1 each, Rfl: 5    vitamin B complex (SUPER B COMPLEX-B-12 ORAL), , Disp: , Rfl:     ALPRAZolam (XANAX) 0.5 MG tablet, Take 1 tablet (0.5 mg total) by mouth 2 (two) times daily as needed for Anxiety., Disp: 30 tablet, Rfl: 0    dulaglutide (TRULICITY) 0.75 mg/0.5 mL pen injector, Inject 0.75 mg into the skin every 7 days., Disp: 12 pen, Rfl: 3    estradioL (ESTRACE) 0.01 % (0.1 mg/gram) vaginal cream, Place 2 g vaginally every evening for 14 days, THEN 1 g every evening for 14 days, THEN 1 g twice a week. Continue 1g twice weekly for maintenance.., Disp: 2 each, Rfl: 1    fluocinonide 0.05% (LIDEX) 0.05 % cream, Apply topically 2 (two) times daily., Disp: 120 g, Rfl: 3   Assessment:       Patient Active Problem List    Diagnosis Date Noted    Scalp psoriasis 01/18/2023    Mixed conductive and sensorineural hearing loss of both ears 01/18/2023    Class 1 obesity due to excess calories with serious comorbidity and body mass index (BMI) of 30.0 to 30.9 in adult 01/18/2023     Screen for colon cancer 08/10/2021    Palpitations 02/11/2021    PVC (premature ventricular contraction) 02/11/2021    Diabetes mellitus, type 2     Allergic rhinitis     History of colon polyps     Hyperlipidemia 08/21/2020    Type 2 diabetes mellitus, without long-term current use of insulin 08/20/2020    Psoriatic arthritis 08/20/2020    Gastroesophageal reflux disease without esophagitis 08/20/2020          Plan:       Charla Patrick  was seen today for follow-up and may need lab work.    Diagnoses and all orders for this visit:    Charla was seen today for diabetes.    Diagnoses and all orders for this visit:    Scalp psoriasis  -     fluocinonide 0.05% (LIDEX) 0.05 % cream; Apply topically 2 (two) times daily.    Type 2 diabetes mellitus without complication, without long-term current use of insulin  -     dulaglutide (TRULICITY) 0.75 mg/0.5 mL pen injector; Inject 0.75 mg into the skin every 7 days.  -     CBC Auto Differential; Future  -     Comprehensive Metabolic Panel; Future  -     Hemoglobin A1C; Future  -     Lipid Panel; Future  -     Microalbumin/Creatinine Ratio, Urine; Future  Controlled with med but Monitor     Mixed conductive and sensorineural hearing loss of both ears  -     Ambulatory referral/consult to Audiology; Future    Other hyperlipidemia  Check labs      Gastroesophageal reflux disease without esophagitis  Controlled with med     Psoriatic arthritis  Improved with Stellara, but Psoriasis should improve with steroid cream     Class 1 obesity due to excess calories with serious comorbidity and body mass index (BMI) of 30.0 to 30.9 in adult  Monitor

## 2023-01-23 ENCOUNTER — PATIENT MESSAGE (OUTPATIENT)
Dept: FAMILY MEDICINE | Facility: CLINIC | Age: 63
End: 2023-01-23
Payer: OTHER GOVERNMENT

## 2023-01-26 ENCOUNTER — HOSPITAL ENCOUNTER (OUTPATIENT)
Dept: RADIOLOGY | Facility: HOSPITAL | Age: 63
Discharge: HOME | End: 2023-01-26
Attending: INTERNAL MEDICINE
Payer: OTHER GOVERNMENT

## 2023-01-26 DIAGNOSIS — Z87.891 HISTORY OF NICOTINE DEPENDENCE: ICD-10-CM

## 2023-01-26 PROCEDURE — 71271 CT CHEST LUNG SCREENING LOW DOSE: ICD-10-PCS | Mod: 26,,, | Performed by: RADIOLOGY

## 2023-01-26 PROCEDURE — 71271 CT THORAX LUNG CANCER SCR C-: CPT | Mod: 26,,, | Performed by: RADIOLOGY

## 2023-01-26 PROCEDURE — 71271 CT THORAX LUNG CANCER SCR C-: CPT | Mod: TC,PO

## 2023-01-27 ENCOUNTER — PROCEDURE VISIT (OUTPATIENT)
Dept: OTOLARYNGOLOGY | Facility: CLINIC | Age: 63
End: 2023-01-27
Payer: OTHER GOVERNMENT

## 2023-01-27 ENCOUNTER — CLINICAL SUPPORT (OUTPATIENT)
Dept: AUDIOLOGY | Facility: CLINIC | Age: 63
End: 2023-01-27
Payer: OTHER GOVERNMENT

## 2023-01-27 DIAGNOSIS — H90.3 SENSORINEURAL HEARING LOSS (SNHL) OF BOTH EARS: Primary | ICD-10-CM

## 2023-01-27 DIAGNOSIS — H61.23 BILATERAL IMPACTED CERUMEN: Primary | ICD-10-CM

## 2023-01-27 DIAGNOSIS — H90.6 MIXED CONDUCTIVE AND SENSORINEURAL HEARING LOSS OF BOTH EARS: ICD-10-CM

## 2023-01-27 DIAGNOSIS — H90.3 PERCEPTIVE HEARING LOSS, BILATERAL: ICD-10-CM

## 2023-01-27 PROCEDURE — 99999 PR PBB SHADOW E&M-EST. PATIENT-LVL I: ICD-10-PCS | Mod: PBBFAC,,,

## 2023-01-27 PROCEDURE — 99999 PR PBB SHADOW E&M-EST. PATIENT-LVL I: CPT | Mod: PBBFAC,,,

## 2023-01-27 PROCEDURE — 69210 EAR CERUMEN REMOVAL: ICD-10-PCS | Mod: S$PBB,,, | Performed by: NURSE PRACTITIONER

## 2023-01-27 PROCEDURE — 69210 REMOVE IMPACTED EAR WAX UNI: CPT | Mod: S$PBB,,, | Performed by: NURSE PRACTITIONER

## 2023-01-27 PROCEDURE — 99211 OFF/OP EST MAY X REQ PHY/QHP: CPT | Mod: PBBFAC,PO

## 2023-01-27 PROCEDURE — 92567 TYMPANOMETRY: CPT | Mod: PBBFAC,PO | Performed by: AUDIOLOGIST

## 2023-01-27 PROCEDURE — 69210 REMOVE IMPACTED EAR WAX UNI: CPT | Mod: PBBFAC,PO | Performed by: NURSE PRACTITIONER

## 2023-01-27 PROCEDURE — 92557 COMPREHENSIVE HEARING TEST: CPT | Mod: PBBFAC,PO | Performed by: AUDIOLOGIST

## 2023-01-27 NOTE — PROCEDURES
Ear Cerumen Removal    Date/Time: 1/27/2023 2:15 PM  Performed by: Jory Arizmendi NP  Authorized by: Jory Arizmendi NP     Consent Done?:  Not Needed    Local anesthetic:  None  Location details:  Both ears  Procedure type: curette    Cerumen  Removal Results:  Cerumen completely removed  Patient tolerance:  Patient tolerated the procedure well with no immediate complications

## 2023-01-27 NOTE — PROGRESS NOTES
The patient was referred by Dr. Deyvi Vela for a hearing evaluation.    Report from the patient was as follows:    Difficulty Hearing/Understanding - The patient reported gradual decrease in hearing.  Tinnitus - negative  History of Loud Noise Exposure -  negative  Family History of Hearing Loss -  Father with hearing loss due to deep sea diving, according to patient report    Otoscopic screening revealed a clear view of TM AU    A hearing evaluation was performed today. Today's results indicated a mild to moderately severe sensorineural hearing loss bilaterally. Impedance testing showed a Type A tympanogram in each ear, consistent with normal middle ear function.    The recommendations were as follows:    (1)  Hearing aid consult pending medical clearance  (2)  Ear protection in loud noise   (3)  Hearing evaluation in one year or sooner if hearing decrease is noted       Today's test results and recommendations were discussed with the patient.

## 2023-01-29 PROBLEM — I70.0 THORACIC AORTIC ATHEROSCLEROSIS: Status: ACTIVE | Noted: 2023-01-29

## 2023-01-30 ENCOUNTER — PATIENT MESSAGE (OUTPATIENT)
Dept: FAMILY MEDICINE | Facility: CLINIC | Age: 63
End: 2023-01-30
Payer: OTHER GOVERNMENT

## 2023-02-03 ENCOUNTER — PATIENT MESSAGE (OUTPATIENT)
Dept: FAMILY MEDICINE | Facility: CLINIC | Age: 63
End: 2023-02-03
Payer: OTHER GOVERNMENT

## 2023-02-04 ENCOUNTER — PATIENT MESSAGE (OUTPATIENT)
Dept: FAMILY MEDICINE | Facility: CLINIC | Age: 63
End: 2023-02-04
Payer: OTHER GOVERNMENT

## 2023-02-04 DIAGNOSIS — H90.8 MIXED CONDUCTIVE AND SENSORINEURAL HEARING LOSS, UNSPECIFIED LATERALITY: Primary | ICD-10-CM

## 2023-02-15 DIAGNOSIS — E78.5 HYPERLIPIDEMIA, UNSPECIFIED HYPERLIPIDEMIA TYPE: ICD-10-CM

## 2023-02-15 DIAGNOSIS — E11.9 TYPE 2 DIABETES MELLITUS WITHOUT COMPLICATION, WITHOUT LONG-TERM CURRENT USE OF INSULIN: ICD-10-CM

## 2023-02-15 RX ORDER — SIMVASTATIN 40 MG/1
40 TABLET, FILM COATED ORAL NIGHTLY
Qty: 90 TABLET | Refills: 1 | Status: SHIPPED | OUTPATIENT
Start: 2023-02-15 | End: 2023-06-30

## 2023-02-15 NOTE — TELEPHONE ENCOUNTER
No new care gaps identified.  [facility] Embedded Care Gaps. Reference number: [id number]. 2/15/2023   2:27:47 PM CST

## 2023-02-15 NOTE — TELEPHONE ENCOUNTER
Refill Decision Note   Charla Barnessanthosh  is requesting a refill authorization.  Brief Assessment and Rationale for Refill:  Approve     Medication Therapy Plan:       Medication Reconciliation Completed: No   Comments:     No Care Gaps recommended.     Note composed:2:35 PM 02/15/2023

## 2023-02-22 ENCOUNTER — LAB VISIT (OUTPATIENT)
Dept: LAB | Facility: HOSPITAL | Age: 63
End: 2023-02-22
Attending: INTERNAL MEDICINE
Payer: OTHER GOVERNMENT

## 2023-02-22 DIAGNOSIS — L40.9 PSORIASIS: ICD-10-CM

## 2023-02-22 DIAGNOSIS — D84.9 IMMUNOSUPPRESSION: ICD-10-CM

## 2023-02-22 DIAGNOSIS — L40.50 PSORIATIC ARTHRITIS: ICD-10-CM

## 2023-02-22 LAB
ALBUMIN SERPL BCP-MCNC: 4 G/DL (ref 3.5–5.2)
ALP SERPL-CCNC: 87 U/L (ref 55–135)
ALT SERPL W/O P-5'-P-CCNC: 33 U/L (ref 10–44)
ANION GAP SERPL CALC-SCNC: 8 MMOL/L (ref 8–16)
AST SERPL-CCNC: 23 U/L (ref 10–40)
BASOPHILS # BLD AUTO: 0.05 K/UL (ref 0–0.2)
BASOPHILS NFR BLD: 0.9 % (ref 0–1.9)
BILIRUB SERPL-MCNC: 0.3 MG/DL (ref 0.1–1)
BUN SERPL-MCNC: 18 MG/DL (ref 8–23)
CALCIUM SERPL-MCNC: 9.7 MG/DL (ref 8.7–10.5)
CHLORIDE SERPL-SCNC: 106 MMOL/L (ref 95–110)
CO2 SERPL-SCNC: 27 MMOL/L (ref 23–29)
CREAT SERPL-MCNC: 0.9 MG/DL (ref 0.5–1.4)
CRP SERPL-MCNC: 4.8 MG/L (ref 0–8.2)
DIFFERENTIAL METHOD: ABNORMAL
EOSINOPHIL # BLD AUTO: 0.2 K/UL (ref 0–0.5)
EOSINOPHIL NFR BLD: 2.8 % (ref 0–8)
ERYTHROCYTE [DISTWIDTH] IN BLOOD BY AUTOMATED COUNT: 13.9 % (ref 11.5–14.5)
ERYTHROCYTE [SEDIMENTATION RATE] IN BLOOD BY PHOTOMETRIC METHOD: 9 MM/HR (ref 0–36)
EST. GFR  (NO RACE VARIABLE): >60 ML/MIN/1.73 M^2
GLUCOSE SERPL-MCNC: 134 MG/DL (ref 70–110)
HCT VFR BLD AUTO: 41.3 % (ref 37–48.5)
HGB BLD-MCNC: 12.8 G/DL (ref 12–16)
IMM GRANULOCYTES # BLD AUTO: 0.02 K/UL (ref 0–0.04)
IMM GRANULOCYTES NFR BLD AUTO: 0.3 % (ref 0–0.5)
LYMPHOCYTES # BLD AUTO: 1.6 K/UL (ref 1–4.8)
LYMPHOCYTES NFR BLD: 28.1 % (ref 18–48)
MCH RBC QN AUTO: 29 PG (ref 27–31)
MCHC RBC AUTO-ENTMCNC: 31 G/DL (ref 32–36)
MCV RBC AUTO: 93 FL (ref 82–98)
MONOCYTES # BLD AUTO: 0.6 K/UL (ref 0.3–1)
MONOCYTES NFR BLD: 9.5 % (ref 4–15)
NEUTROPHILS # BLD AUTO: 3.4 K/UL (ref 1.8–7.7)
NEUTROPHILS NFR BLD: 58.4 % (ref 38–73)
NRBC BLD-RTO: 0 /100 WBC
PLATELET # BLD AUTO: 239 K/UL (ref 150–450)
PMV BLD AUTO: 11.5 FL (ref 9.2–12.9)
POTASSIUM SERPL-SCNC: 4.5 MMOL/L (ref 3.5–5.1)
PROT SERPL-MCNC: 6.9 G/DL (ref 6–8.4)
RBC # BLD AUTO: 4.42 M/UL (ref 4–5.4)
SODIUM SERPL-SCNC: 141 MMOL/L (ref 136–145)
WBC # BLD AUTO: 5.81 K/UL (ref 3.9–12.7)

## 2023-02-22 PROCEDURE — 80053 COMPREHEN METABOLIC PANEL: CPT | Performed by: INTERNAL MEDICINE

## 2023-02-22 PROCEDURE — 36415 COLL VENOUS BLD VENIPUNCTURE: CPT | Mod: PO | Performed by: INTERNAL MEDICINE

## 2023-02-22 PROCEDURE — 85652 RBC SED RATE AUTOMATED: CPT | Performed by: INTERNAL MEDICINE

## 2023-02-22 PROCEDURE — 85025 COMPLETE CBC W/AUTO DIFF WBC: CPT | Performed by: INTERNAL MEDICINE

## 2023-02-22 PROCEDURE — 86140 C-REACTIVE PROTEIN: CPT | Performed by: INTERNAL MEDICINE

## 2023-03-31 ENCOUNTER — PATIENT MESSAGE (OUTPATIENT)
Dept: RHEUMATOLOGY | Facility: CLINIC | Age: 63
End: 2023-03-31
Payer: OTHER GOVERNMENT

## 2023-03-31 DIAGNOSIS — Z79.899 HIGH RISK MEDICATIONS (NOT ANTICOAGULANTS) LONG-TERM USE: ICD-10-CM

## 2023-03-31 DIAGNOSIS — D84.9 IMMUNOSUPPRESSION: ICD-10-CM

## 2023-03-31 DIAGNOSIS — L40.50 PSORIATIC ARTHRITIS: Primary | ICD-10-CM

## 2023-04-03 ENCOUNTER — PATIENT MESSAGE (OUTPATIENT)
Dept: RHEUMATOLOGY | Facility: CLINIC | Age: 63
End: 2023-04-03
Payer: OTHER GOVERNMENT

## 2023-04-19 ENCOUNTER — PATIENT MESSAGE (OUTPATIENT)
Dept: FAMILY MEDICINE | Facility: CLINIC | Age: 63
End: 2023-04-19
Payer: OTHER GOVERNMENT

## 2023-04-19 ENCOUNTER — OFFICE VISIT (OUTPATIENT)
Dept: RHEUMATOLOGY | Facility: CLINIC | Age: 63
End: 2023-04-19
Payer: OTHER GOVERNMENT

## 2023-04-19 VITALS
DIASTOLIC BLOOD PRESSURE: 74 MMHG | SYSTOLIC BLOOD PRESSURE: 126 MMHG | HEIGHT: 65 IN | WEIGHT: 184.63 LBS | HEART RATE: 60 BPM | BODY MASS INDEX: 30.76 KG/M2

## 2023-04-19 DIAGNOSIS — D84.9 IMMUNOSUPPRESSION: ICD-10-CM

## 2023-04-19 DIAGNOSIS — L40.50 PSORIATIC ARTHRITIS: Primary | ICD-10-CM

## 2023-04-19 DIAGNOSIS — L40.9 SCALP PSORIASIS: ICD-10-CM

## 2023-04-19 DIAGNOSIS — Z79.899 HIGH RISK MEDICATIONS (NOT ANTICOAGULANTS) LONG-TERM USE: ICD-10-CM

## 2023-04-19 PROCEDURE — 99999 PR PBB SHADOW E&M-EST. PATIENT-LVL IV: CPT | Mod: PBBFAC,,, | Performed by: INTERNAL MEDICINE

## 2023-04-19 PROCEDURE — 99999 PR PBB SHADOW E&M-EST. PATIENT-LVL IV: ICD-10-PCS | Mod: PBBFAC,,, | Performed by: INTERNAL MEDICINE

## 2023-04-19 PROCEDURE — 99215 OFFICE O/P EST HI 40 MIN: CPT | Mod: S$PBB,,, | Performed by: INTERNAL MEDICINE

## 2023-04-19 PROCEDURE — 99215 PR OFFICE/OUTPT VISIT, EST, LEVL V, 40-54 MIN: ICD-10-PCS | Mod: S$PBB,,, | Performed by: INTERNAL MEDICINE

## 2023-04-19 PROCEDURE — 99214 OFFICE O/P EST MOD 30 MIN: CPT | Mod: PBBFAC,PN | Performed by: INTERNAL MEDICINE

## 2023-04-19 ASSESSMENT — ROUTINE ASSESSMENT OF PATIENT INDEX DATA (RAPID3)
MDHAQ FUNCTION SCORE: 0.4
PATIENT GLOBAL ASSESSMENT SCORE: 1
TOTAL RAPID3 SCORE: 1.44
PSYCHOLOGICAL DISTRESS SCORE: 1.1
FATIGUE SCORE: 0
PAIN SCORE: 2

## 2023-04-19 NOTE — TELEPHONE ENCOUNTER
Care Due:                  Date            Visit Type   Department     Provider  --------------------------------------------------------------------------------                                EP -                              [facility]  Last Visit: 01-      CARE ([facility])   DHIRAJ Vela                               -                              [facility]  Next Visit: 07-      CARE ([facility])   MEDICINE       Deyvi Vela                                                            Last  Test          Frequency    Reason                     Performed    Due Date  --------------------------------------------------------------------------------    HBA1C.......  6 months...  dulaglutide, glimepiride.  01- 07-    Lipid Panel.  12 months..  simvastatin..............  07- 07-    [facility] Embedded Care Gaps. Reference number: [id number]. 4/19/2023   11:22:17 AM CDT

## 2023-04-19 NOTE — PROGRESS NOTES
Subjective:          Chief Complaint: Charla Patrick is a 63 y.o. female who had concerns including Disease Management.    HPI:  Patient is a 63-year-old female previously seen [place] for psoriatic arthritis moved from [place] patient was seen in the [facility] in [place] her last office visit being 01/12/2021.  Remote diagnosis 2006 - acute psoriasis.   Joints began shortly after the skin, joint pain was located in the fingers, hips, and both feet (chronic plantar fasciitis) .      Aggravating factors with the morning evening and during activity.    Alleviating factors were exercise and medications associated factors joint stiffness, fatigue, lack of sleep, joint pain, joint swelling, abdominal pain and night sweats.      She also notes dry eyes and dry mouth psoriasis family history of other autoimmune conditions.    Patient denies ulcerative colitis, crohns or other colitis. Only IBS to date.   Having issues with continued GI complaints and c/o depression more difficult to manage with Otezla so discontinued.   Small recurrent scalp lesion is most prevalent historically.   Started Cosentyx (8/2021)  Tolerating. Completed loading 5 doses and 8 months total thus far. Patient is at 8 months Consetyx 150mg every 30 day,  as effective as Otezla but no GI ASE. Now at nearly 1 year adjust to Cosentyx 300mg montly and Rapid 3 worsening. Patient notes pain 6/10 right hip, right knee and hands.     Updated Quantiferon gold 8/2021 negative.   Pain in knees (describes burning ) right knee is worsening with pop fossa pain since last visit still painful,  no prior hx of knee pain. Better with activity. No swelling noting. No buckling.    Left elbow with injury still hurting.   Right lateral hip waking with pain. Typically it will stop or be brief now seems present every morning for over 30min.   Rates all this pain 2/10 so when comparing to previous visit. Seems to be doing well with Stelara.    Morning stiffness: 15-20min but ++gelling phenomena.   Multi-Dimensional Health Assessment 8/25/2022 12/19/2022 4/19/2023   MHAQ Score 0.7 0.4 0.4   Psychologic Score 1.1 2.2 1.1   Pain Score 6 4 2   Fatigue Score 2.2 1.1 0   Global Health Score 6 3 1   RAPID3 Score 4.78 2.78 1.44     Current medication:  Stelara 45mg Q 12 w (start 9/2022)   MTX 4 tabs weekly  Folic acid 1 mg daily  tizandine 4mg tablets.   Tylenol PRN    Previous medications trialed:    Methotrexate began 2007.  Discontinued methotrexate 05/2017 no ASE or LAE.   Enbrel without adverse side effects no mention exact type.  Humira was loss of back efficacy over time,  Cimzia no benefit.   Otezla 30 mg once daily (0453-6349)- losing efficacy  Celebrex but developed a  gastritis despite this.  Cosentyx 300mg monthly (DC 9/2022) loss of efficacy  No personal hx of Cancer  No DVT/seizure or stroke, startin estradiol     Patient also has osteopenia her last DEXA 2018 was osteopenia but her FRAX score was below recommended bisphosphonate or other therapeutic ranges so she was continued on regular weight-bearing calcium and vitamin-D and repeat DXA is every 2 years    [employer] ER 7 10/23/2019 ultrasound bilateral hands  Left hand visualized portions of the bone muscle tendon joints and median nerve are normal appearing particular evaluation at the MCP 2 3 and 5 transverse views also obtained no evidence of any erosions power Doppler use for synovitis and tenosynovitis which was also negative.  I have a TB from 03/23/2020 as a QuantiFERON gold that is negative      REVIEW OF SYSTEMS:    Review of Systems   Constitutional:  Negative for fever, malaise/fatigue and weight loss.   HENT:  Negative for sore throat.    Eyes:  Negative for double vision, photophobia and redness.   Respiratory:  Negative for cough, shortness of breath and wheezing.    Cardiovascular:  Negative for chest pain, palpitations and orthopnea.   Gastrointestinal:  Negative for abdominal  pain, constipation and diarrhea.   Genitourinary:  Negative for dysuria, hematuria and urgency.   Musculoskeletal:  Positive for joint pain. Negative for back pain and myalgias.   Skin:  Negative for rash.   Neurological:  Negative for dizziness, tingling, focal weakness and headaches.   Endo/Heme/Allergies:  Does not bruise/bleed easily.   Psychiatric/Behavioral:  Negative for depression, hallucinations and suicidal ideas.              Objective:            Past Medical History:   Diagnosis Date    Allergic rhinitis     Diabetes mellitus, type 2     History of colon polyps     Hyperlipidemia     Psoriatic arthritis      Family History   Problem Relation Age of Onset    Heart disease Mother     Diabetes Mother     Brain cancer Father         Glioblastoma     Psoriasis Father     Thyroid disease Sister         hypothyroidism     Diabetes Sister     Psoriasis Sister     Cerebral palsy Daughter     Heart disease Daughter     Diabetes Daughter     No Known Problems Son     Stroke Maternal Grandmother     Diabetes Maternal Grandmother     Glaucoma Maternal Grandmother     Diabetes Maternal Grandfather     Ovarian cancer Paternal Grandmother     Psoriasis Paternal Grandmother     Pancreatic cancer Paternal Grandfather     No Known Problems Sister     Psoriasis Brother     Macular degeneration Neg Hx     Eczema Neg Hx     Lupus Neg Hx     Melanoma Neg Hx      Social History     Tobacco Use    Smoking status: Former     Packs/day: 1.00     Years: 40.00     Pack years: 40.00     Types: Cigarettes     Quit date: 2019     Years since quittin.1    Smokeless tobacco: Never    Tobacco comments:     former smoker   Substance Use Topics    Alcohol use: Never    Drug use: Never         Current Outpatient Medications on File Prior to Visit   Medication Sig Dispense Refill    ascorbic acid, vitamin C, (VITAMIN C) 500 MG tablet Take 500 mg by mouth once daily.      aspirin 81 MG Chew Take 81 mg by mouth once daily.      blood  sugar diagnostic Strp To check BG 2 times daily, to use with insurance preferred meter 200 strip 3    cholecalciferol, vitamin D3, (VITAMIN D3) 50 mcg (2,000 unit) Cap capsule Take 2,000 Units by mouth.      clobetasol 0.05% (TEMOVATE) 0.05 % Oint Clobetasol propionate 0.05% once daily at night for 4 weeks, then alternate nights for 4 weeks, and then twice weekly for 4 weeks 45 g 2    dulaglutide (TRULICITY) 0.75 mg/0.5 mL pen injector Inject 0.75 mg into the skin every 7 days. 12 pen 3    famotidine (PEPCID) 40 MG tablet Take 40 mg by mouth once daily.      fluocinonide 0.05% (LIDEX) 0.05 % cream Apply topically 2 (two) times daily. 120 g 3    folic acid (FOLVITE) 1 MG tablet Take 1 tablet (1 mg total) by mouth once daily. 100 tablet 3    glimepiride (AMARYL) 1 MG tablet Take 1 tablet (1 mg total) by mouth daily with breakfast. 90 tablet 1    loratadine (CLARITIN) 10 mg tablet TAKE 1 TABLET DAILY 90 tablet 3    methotrexate 2.5 MG Tab Take 4 tablets (10 mg total) by mouth every 7 days. 48 tablet 1    pantoprazole (PROTONIX) 40 MG tablet TAKE 1 TABLET DAILY 90 tablet 3    simvastatin (ZOCOR) 40 MG tablet Take 1 tablet (40 mg total) by mouth every evening. 90 tablet 1    sulfacetamide sodium-sulfur 10-5 % (w/w) Clsr Use to wash face daily 340 g 11    tiZANidine (ZANAFLEX) 4 MG tablet Take 1 tablet (4 mg total) by mouth nightly as needed (spasm of muscle). 90 tablet 3    turmeric root extract 500 mg Cap Take 500 mg by mouth once daily at 6am.      ustekinumab (STELARA) 45 mg/0.5 mL Syrg syringe Inject 45mg SQ on week, 0, 4 and then every 12 weeks. 1 each 5    vitamin B complex (SUPER B COMPLEX-B-12 ORAL)       ALPRAZolam (XANAX) 0.5 MG tablet Take 1 tablet (0.5 mg total) by mouth 2 (two) times daily as needed for Anxiety. 30 tablet 0    estradioL (ESTRACE) 0.01 % (0.1 mg/gram) vaginal cream Place 2 g vaginally every evening for 14 days, THEN 1 g every evening for 14 days, THEN 1 g twice a week. Continue 1g twice  weekly for maintenance.. 2 each 1     No current facility-administered medications on file prior to visit.       Vitals:    04/19/23 1301   BP: 126/74   Pulse: 60       Physical Exam:    Physical Exam  Constitutional:       Appearance: She is well-developed.   Eyes:      General: Lids are normal.   Skin:     Comments: Flare psoriasis EAC bilaterally with prominent scale and erythema.    Neurological:      Mental Status: She is oriented to person, place, and time.   Psychiatric:         Behavior: Behavior normal.         Thought Content: Thought content normal.   There is currently no information documented on the homunculus. Go to the Rheumatology activity and complete the homunculus joint exam.          Rapid 3: 4.78. (8/2022)           Assessment:       Encounter Diagnoses   Name Primary?    Psoriatic arthritis Yes    Scalp psoriasis     Immunosuppression     High risk medications (not anticoagulants) long-term use             Plan:        Psoriatic arthritis  -     CBC Auto Differential; Standing; Expected date: 04/19/2023  -     Comprehensive Metabolic Panel; Standing; Expected date: 04/19/2023  -     Sedimentation rate; Standing; Expected date: 04/19/2023  -     C-Reactive Protein; Standing; Expected date: 04/19/2023  -     CBC Auto Differential; Standing; Expected date: 04/19/2023  -     Comprehensive Metabolic Panel; Standing; Expected date: 04/19/2023  -     Sedimentation rate; Standing; Expected date: 04/19/2023  -     C-Reactive Protein; Standing; Expected date: 04/19/2023    Scalp psoriasis    Immunosuppression  -     CBC Auto Differential; Standing; Expected date: 04/19/2023  -     Comprehensive Metabolic Panel; Standing; Expected date: 04/19/2023  -     Sedimentation rate; Standing; Expected date: 04/19/2023  -     C-Reactive Protein; Standing; Expected date: 04/19/2023  -     CBC Auto Differential; Standing; Expected date: 04/19/2023  -     Comprehensive Metabolic Panel; Standing; Expected date:  04/19/2023  -     Sedimentation rate; Standing; Expected date: 04/19/2023  -     C-Reactive Protein; Standing; Expected date: 04/19/2023    High risk medications (not anticoagulants) long-term use  -     CBC Auto Differential; Standing; Expected date: 04/19/2023  -     Comprehensive Metabolic Panel; Standing; Expected date: 04/19/2023  -     Sedimentation rate; Standing; Expected date: 04/19/2023  -     C-Reactive Protein; Standing; Expected date: 04/19/2023           Patient with long standing PsA:    -Started Stelara 9/2022 [facility]   [facility] no [facility] needed per [facility] 9/2022 [facility] which I did on 9/7/2022 with 5 refills. Patient is due 1/4/2023.     Enbrel without adverse side effects no mention exact type.  Humira was loss of back efficacy over time,  Cimzia no benefit.   Otezla 30 mg once daily (6612-6619)- losing efficacy  Celebrex but developed a  gastritis despite this.  Cosentyx 300mg monthly still with rising Rapid 3 and skin changes.       Continue Stelara  Continue Methotrexate. She is to take 4 tablets ONE TIME WEEKLY. Folic acid is one tablet daily  Labs in 3 months.        No follow-ups on file.        40min consultation with greater than 50% of that time included Preparing to see the patient (review records, tests), Obtaining and/or reviewing separately obtained historical data, Performing a medically appropriate examination and/or evaluation , Ordering medications, tests, and/or procedures, Referring and communicating with other healthcare professionals , Documenting clinical information in the electronic or other health record and Independently interpreting results  (as warranted) & communicating results to the patient/family/caregiver. All questions answered.

## 2023-04-20 RX ORDER — FAMOTIDINE 40 MG/1
40 TABLET, FILM COATED ORAL DAILY
Qty: 90 TABLET | Refills: 3 | Status: SHIPPED | OUTPATIENT
Start: 2023-04-20 | End: 2024-02-15

## 2023-04-25 ENCOUNTER — TELEPHONE (OUTPATIENT)
Dept: FAMILY MEDICINE | Facility: CLINIC | Age: 63
End: 2023-04-25
Payer: OTHER GOVERNMENT

## 2023-04-25 NOTE — TELEPHONE ENCOUNTER
Spoke with patient in regards to rescheduling her 7/19 appointment. Patient was rescheduled for 6/30 at 2:40 pm.

## 2023-05-03 ENCOUNTER — HOSPITAL ENCOUNTER (OUTPATIENT)
Dept: RADIOLOGY | Facility: HOSPITAL | Age: 63
Discharge: HOME | End: 2023-05-03
Attending: PHYSICIAN ASSISTANT
Payer: OTHER GOVERNMENT

## 2023-05-03 ENCOUNTER — OFFICE VISIT (OUTPATIENT)
Dept: ORTHOPEDICS | Facility: CLINIC | Age: 63
End: 2023-05-03
Payer: OTHER GOVERNMENT

## 2023-05-03 DIAGNOSIS — S59.902A INJURY OF LEFT ELBOW, INITIAL ENCOUNTER: ICD-10-CM

## 2023-05-03 DIAGNOSIS — M77.12 LATERAL EPICONDYLITIS OF LEFT ELBOW: Primary | ICD-10-CM

## 2023-05-03 PROCEDURE — 99203 PR OFFICE/OUTPT VISIT, NEW, LEVL III, 30-44 MIN: ICD-10-PCS | Mod: S$PBB,,, | Performed by: PHYSICIAN ASSISTANT

## 2023-05-03 PROCEDURE — 73080 X-RAY EXAM OF ELBOW: CPT | Mod: TC,PO,LT

## 2023-05-03 PROCEDURE — 73080 XR ELBOW COMPLETE 3 VIEW LEFT: ICD-10-PCS | Mod: 26,LT,, | Performed by: RADIOLOGY

## 2023-05-03 PROCEDURE — 73080 X-RAY EXAM OF ELBOW: CPT | Mod: 26,LT,, | Performed by: RADIOLOGY

## 2023-05-03 PROCEDURE — 99999 PR PBB SHADOW E&M-EST. PATIENT-LVL III: CPT | Mod: PBBFAC,,, | Performed by: PHYSICIAN ASSISTANT

## 2023-05-03 PROCEDURE — 99999 PR PBB SHADOW E&M-EST. PATIENT-LVL III: ICD-10-PCS | Mod: PBBFAC,,, | Performed by: PHYSICIAN ASSISTANT

## 2023-05-03 PROCEDURE — 99213 OFFICE O/P EST LOW 20 MIN: CPT | Mod: PBBFAC,PN | Performed by: PHYSICIAN ASSISTANT

## 2023-05-03 PROCEDURE — 99203 OFFICE O/P NEW LOW 30 MIN: CPT | Mod: S$PBB,,, | Performed by: PHYSICIAN ASSISTANT

## 2023-05-03 NOTE — PROGRESS NOTES
"5/3/2023    Chief Complaint:  Chief Complaint   Patient presents with    Left Elbow - Pain        HPI:  Charla Patrick is a 63 y.o. female who presents to clinic today for evaluation of her left elbow pain/injury.  States approximally 4 months ago she was pulling a cooler in the house that was very full and heavy.  States she was trying to pull it up above the lip of the house and injured her left elbow.  States she had pain, swelling, and difficulty using the left elbow.  States it resolved over the next month or so.  States recently last week she was carrying her groceries when she re-injured her left elbow.  States pain is worse with activity.  States pain is better with rest.  States he is here today for evaluation.  Denies any other complaints at this time.    PMHX:  Past Medical History:   Diagnosis Date    Allergic rhinitis     Diabetes mellitus, type 2     History of colon polyps     Hyperlipidemia     Psoriatic arthritis        PSHX:  Past Surgical History:   Procedure Laterality Date    APPENDECTOMY      BREAST LUMPECTOMY      BREAST SURGERY      left, "benign tumor"    COLONOSCOPY N/A 08/10/2021    Procedure: COLONOSCOPY  Banding of hemorrhoids possible;  Surgeon: Faizan Mejia MD;  Location[facility];  Service: Endoscopy;  Laterality: N/A;    FOOT SURGERY Bilateral     plantar fasciitis and neuroma by [facility]    HYSTERECTOMY      OOPHORECTOMY         FMHX:  Family History   Problem Relation Age of Onset    Heart disease Mother     Diabetes Mother     Brain cancer Father         Glioblastoma     Psoriasis Father     Thyroid disease Sister         hypothyroidism     Diabetes Sister     Psoriasis Sister     Cerebral palsy Daughter     Heart disease Daughter     Diabetes Daughter     No Known Problems Son     Stroke Maternal Grandmother     Diabetes Maternal Grandmother     Glaucoma Maternal Grandmother     Diabetes Maternal Grandfather     Ovarian cancer Paternal Grandmother     Psoriasis Paternal " Grandmother     Pancreatic cancer Paternal Grandfather     No Known Problems Sister     Psoriasis Brother     Macular degeneration Neg Hx     Eczema Neg Hx     Lupus Neg Hx     Melanoma Neg Hx        SOCHX:  Social History     Tobacco Use    Smoking status: Former     Packs/day: 1.00     Years: 40.00     Pack years: 40.00     Types: Cigarettes     Quit date: 2019     Years since quittin.2    Smokeless tobacco: Never    Tobacco comments:     former smoker   Substance Use Topics    Alcohol use: Never       ALLERGIES:  Rosemary, Codeine, Zoloft [sertraline], Enbrel [etanercept], and Lamisil [terbinafine hcl]    CURRENT MEDICATIONS:  Current Outpatient Medications on File Prior to Visit   Medication Sig Dispense Refill    ascorbic acid, vitamin C, (VITAMIN C) 500 MG tablet Take 500 mg by mouth once daily.      aspirin 81 MG Chew Take 81 mg by mouth once daily.      blood sugar diagnostic Strp To check BG 2 times daily, to use with insurance preferred meter 200 strip 3    cholecalciferol, vitamin D3, (VITAMIN D3) 50 mcg (2,000 unit) Cap capsule Take 2,000 Units by mouth.      clobetasol 0.05% (TEMOVATE) 0.05 % Oint Clobetasol propionate 0.05% once daily at night for 4 weeks, then alternate nights for 4 weeks, and then twice weekly for 4 weeks 45 g 2    dulaglutide (TRULICITY) 0.75 mg/0.5 mL pen injector Inject 0.75 mg into the skin every 7 days. 12 pen 3    famotidine (PEPCID) 40 MG tablet Take 1 tablet (40 mg total) by mouth once daily. 90 tablet 3    fluocinonide 0.05% (LIDEX) 0.05 % cream Apply topically 2 (two) times daily. 120 g 3    folic acid (FOLVITE) 1 MG tablet Take 1 tablet (1 mg total) by mouth once daily. 100 tablet 3    glimepiride (AMARYL) 1 MG tablet Take 1 tablet (1 mg total) by mouth daily with breakfast. 90 tablet 1    loratadine (CLARITIN) 10 mg tablet TAKE 1 TABLET DAILY 90 tablet 3    methotrexate 2.5 MG Tab Take 4 tablets (10 mg total) by mouth every 7 days. 48 tablet 1    pantoprazole  (PROTONIX) 40 MG tablet TAKE 1 TABLET DAILY 90 tablet 3    simvastatin (ZOCOR) 40 MG tablet Take 1 tablet (40 mg total) by mouth every evening. 90 tablet 1    sulfacetamide sodium-sulfur 10-5 % (w/w) Clsr Use to wash face daily 340 g 11    tiZANidine (ZANAFLEX) 4 MG tablet Take 1 tablet (4 mg total) by mouth nightly as needed (spasm of muscle). 90 tablet 3    turmeric root extract 500 mg Cap Take 500 mg by mouth once daily at 6am.      ustekinumab (STELARA) 45 mg/0.5 mL Syrg syringe Inject 45mg SQ on week, 0, 4 and then every 12 weeks. 1 each 5    vitamin B complex (SUPER B COMPLEX-B-12 ORAL)       ALPRAZolam (XANAX) 0.5 MG tablet Take 1 tablet (0.5 mg total) by mouth 2 (two) times daily as needed for Anxiety. 30 tablet 0    estradioL (ESTRACE) 0.01 % (0.1 mg/gram) vaginal cream Place 2 g vaginally every evening for 14 days, THEN 1 g every evening for 14 days, THEN 1 g twice a week. Continue 1g twice weekly for maintenance.. 2 each 1     No current facility-administered medications on file prior to visit.       REVIEW OF SYSTEMS:  ROS    Review of Systems Compete; Negative, unless noted above.    GENERAL PHYSICAL EXAM:   There were no vitals taken for this visit.   GEN: well developed, well nourished, no acute distress   HENT: Normocephalic, atraumatic   EYES: No discharge, conjunctiva normal   PULM: No wheezing, no respiratory distress   CV: Regular rate and rhythm     ORTHO EXAM:   Examination of the left elbow reveals mild edema over the area of the lateral epicondyle.  No erythema, ecchymosis, or skin breakdown.  Tenderness palpation over the area of the lateral epicondyle.  Tenderness with resisted wrist extension.  5/5 /intrinsic strength. 5/5 wrist extension/flexion strength.  Normal sensation in the radial, ulnar, median nerve distributions.  Capillary refill less than 2 seconds in all fingers.    RADIOLOGY:   X-rays of the left elbow were taken today in clinic.  X-rays reviewed by myself.  Imaging  showed no acute fracture or dislocation.  No subluxation.  No radiopaque foreign body or mass noted.  No osseous destructive/erosive processes noted.  No degenerative changes noted.  Negative x-rays of the left elbow.    ASSESSMENT:   Lateral epicondylitis of the left elbow    PLAN:  1. I discussed with Charla Patrick and her  the best course of action this time is to perform a course of physical therapy to increase the function and decrease the tenderness of the left elbow/arm.  We also discussed would proceed with splinting/bracing of the left wrist/elbow via a tennis elbow strap and removable Velcro short wrist splint.  We discussed she is not a candidate for oral prescription NSAIDs or steroids due to being a diabetic and taking methotrexate. They verbally agreed with the treatment plan.    2.  She was placed in a removable Velcro short wrist splint in clinic today.  She was instructed on how to purchase the appropriate tennis elbow strap in clinic today.  She was instructed to wear both of these splints/straps for activity.    3.  She was referred to to occupational therapy in clinic today.      4. I would like her to follow up in clinic in 6 weeks for repeat evaluation.  She was instructed to contact clinic for any problems or concerns in the interim.

## 2023-05-04 DIAGNOSIS — Z12.31 OTHER SCREENING MAMMOGRAM: ICD-10-CM

## 2023-05-05 NOTE — PROGRESS NOTES
Please see POC note for Eval results and tx performed this date.    [facility]     Azelaic Acid Pregnancy And Lactation Text: This medication is considered safe during pregnancy and breast feeding.

## 2023-05-08 ENCOUNTER — PATIENT MESSAGE (OUTPATIENT)
Dept: ADMINISTRATIVE | Facility: HOSPITAL | Age: 63
End: 2023-05-08
Payer: OTHER GOVERNMENT

## 2023-05-09 ENCOUNTER — CLINICAL SUPPORT (OUTPATIENT)
Dept: REHABILITATION | Facility: HOSPITAL | Age: 63
End: 2023-05-09
Payer: OTHER GOVERNMENT

## 2023-05-09 DIAGNOSIS — Z78.9 IMPAIRED MOBILITY AND ADLS: ICD-10-CM

## 2023-05-09 DIAGNOSIS — S59.902A INJURY OF LEFT ELBOW, INITIAL ENCOUNTER: ICD-10-CM

## 2023-05-09 DIAGNOSIS — M77.12 LATERAL EPICONDYLITIS OF LEFT ELBOW: ICD-10-CM

## 2023-05-09 DIAGNOSIS — M25.522 PAIN IN LEFT ELBOW: ICD-10-CM

## 2023-05-09 DIAGNOSIS — M25.632 STIFFNESS OF WRIST JOINT, LEFT: ICD-10-CM

## 2023-05-09 DIAGNOSIS — Z74.09 IMPAIRED MOBILITY AND ADLS: ICD-10-CM

## 2023-05-09 DIAGNOSIS — R29.898 WEAKNESS OF LEFT UPPER EXTREMITY: ICD-10-CM

## 2023-05-09 PROCEDURE — 97166 OT EVAL MOD COMPLEX 45 MIN: CPT | Mod: PO

## 2023-05-09 PROCEDURE — 97110 THERAPEUTIC EXERCISES: CPT | Mod: PO

## 2023-05-09 NOTE — PLAN OF CARE
[facility] Occupational Therapy  Initial Evaluation     Date: 5/9/2023  Patient: Charla Patrick  Chart Number: [id number]    Treatment Diagnosis:   1. Injury of left elbow, initial encounter  Ambulatory referral/consult to Physical/Occupational Therapy      2. Lateral epicondylitis of left elbow  Ambulatory referral/consult to Physical/Occupational Therapy      3. Stiffness of wrist joint, left        4. Pain in left elbow        5. Weakness of left upper extremity        6. Impaired mobility and ADLs            Physician: Tristin Jordan PA-C    Physician Orders: Note:    Patient has lateral epicondylitis symptoms of the left elbow.  Patient requires occupational therapy to decrease the tenderness and increase the function of the left elbow/arm.       Duration:  6 weeks      Frequency:  2-3 times per week      Please work on range of motion, stretching, edema control, and therapeutic modalities (such as thermal therapies, E stim, and ultrasound, etc.).  Thanks!      Medical Diagnosis: Injury of left elbow, initial encounter [S59.902A]   Lateral epicondylitis of left elbow [M77.12]    Evaluation Date: 5/9/2023  Insurance Authorization period Expiration:  Calendar year  Plan of Care Expiration Period: 6 weeks = 6/19/2023  Next Re-assessment: 30 days (6/08/2023) and/or 10th visit  Date of Return Referring Provider 6/14/2023    Visit # / Visits Authortized: 1 / TBD  # No shows 0 / # Cancellations: 0  Time In: 0916  Time Out: 0946  Evaluation minutes = 20  Total Billable Time: 10 minutes    Precautions: Standard  Surgery: N/A     S/P: approx 4 months    Subjective     Involved Side: Left   Dominant Side: Left  Date of Onset: approx 4 months ago    Mechanism of Injury/ History of Current Condition: Per ortho HPI from 5/3/2023: HPI:  Charla Patrick is a 63 y.o. female who presents to clinic today for evaluation of her left elbow pain/injury.  States approximally 4 months ago she was pulling a cooler  in the house that was very full and heavy.  States she was trying to pull it up above the lip of the house and injured her left elbow.  States she had pain, swelling, and difficulty using the left elbow.  States it resolved over the next month or so.  States recently last week she was carrying her groceries when she re-injured her left elbow.  States pain is worse with activity.  States pain is better with rest.  States he is here today for evaluation.  Denies any other complaints at this time.       Other Surgical/PMHX involved UE:  None reported    Imaging: X rays:    Last X ray from: 5/03/2023  FINDINGS:  No acute fracture, dislocation, or osseous destructive process appreciated radiographically.  No elbow joint space narrowing.  No enthesophyte formation.  No olecranon bursitis or radiographically evident elbow joint effusion.     Previous Therapy:  None reported    Patient's Goals for Therapy:  To get rid of the pain in LUE, be able use LUE normally, open jars.    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    Current FunctionalStatus   Home/Living environment : Pt lives at home.    Limitation of Functional Status as follows:   ADLs/IADLs: Moderate overall difficulty reported with basic ADL/IADL tasks.               .   Also reports significant difficulty with Work and or Leisure tasks including: .  See FOTO  results for further related to UE function.    Past Medical History/Physical Systems Review:   Charla Patrick  has a past medical history of Allergic rhinitis, Diabetes mellitus, type 2, History of colon polyps, Hyperlipidemia, and  Psoriatic arthritis.    Charla Patrick  has a past surgical history that includes Hysterectomy; Foot surgery (Bilateral); Breast surgery; Breast lumpectomy; Oophorectomy; Appendectomy; and Colonoscopy (N/A, 08/10/2021).    Charla has a current medication list which includes the following prescription(s): alprazolam, ascorbic acid (vitamin c), aspirin, blood sugar diagnostic, cholecalciferol (vitamin d3), clobetasol 0.05%, trulicity, estradiol, famotidine, fluocinonide 0.05%, folic acid, glimepiride, loratadine, methotrexate, pantoprazole, simvastatin, sulfacetamide sodium-sulfur, tizanidine, turmeric root extract, stelara, and vitamin b complex.    Review of patient's allergies indicates:   Allergen Reactions    Court Other (See Comments)     Angioedema     Codeine      Nausea and Shaky    Zoloft [sertraline]      Feeling a roller coaster in chest up to neck     Enbrel [etanercept] Rash    Lamisil [terbinafine hcl] Rash          Objective     CMS Impairment/Limitation/Restriction for FOTO Elbow Survey    Therapist reviewed FOTO scores for Charla Patrick on 5/9/2023.   FOTO documents entered into [employer] - see Media section.    Limitation Score: 35%      Observation/Inspection/Wound/Incision/Scar   Mild to moderate edema.    Sensation: Grossly WNL,    Edema:          Circumferential (in cm) L R   Proximal Wrist Crease 17.0 16.6   MPs NT NT   Mid P1 of  Long Finger NT NT      AROM Hand: Tip to palm/DPC digits 1-5 (in cm) WNL       AROM Wrist/Forearm:               Left              Right   Wrist Ext/Flex:  60/90° Ext/Flex:  75/90°        Forearm Pron/Sup  WNL ° Pro/Sup: WNL  °     AROM Elbow:                Left              Right   Elbow Ext/Flex  +5/WNL° Ext/Flex:  +5/WNL°         Manual Muscle Test: NT                                       and Pinch Strength: NTat this time due to current pain level       Special and/or Standardized Tests:  NT      Treatment     Treatment Time In:  0936  Treatment Time  Out:  0946  Total Treatment time separate from Evaluation time:10 minutes.    Charla received therapeutic exercises for 8 minutes including: Initial Home Exercise Program Instruction.  Eccentrics for L wrist 2x10 2 x day palm up and palm down    Ed/Self care; Ice massage 1-2 x day 3 min to L lateral elbow (2 min)      Home Exercise Program/Education:  Issued HEP (see patient instructions in EMR) and educated on modality use for pain management . Exercises were reviewed and Charla was able to demonstrate them prior to the end of the session.   Pt received a written copy of exercises to perform at home. Charla demonstrated good  understanding of the education provided.  Pt was advised to perform these exercises free of pain, and to stop performing them if pain occurs.    Patient/Family Education: role of OT, goals for OT, scheduling/cancellations - pt verbalized understanding.     Additional Education provided: Cont with counterforce and wrist brace use when active.    Assessment     Charla Patrick is a 63 y.o. female referred to outpatient occupational/hand therapy and presents with a medical diagnosis of Lateral epicondylitis resulting in, pain, edema, decreased A/PROM, strength, and functional use of involved upper extremity/extremities) and demonstrates limitations as described in the chart below.     The patient's rehab potential is good for the goals listed below.    No anticipated barriers to occupational therapy.  Pt has no cultural, educational or language barriers to learning provided.    Profile and History Assessment of Occupational Performance Level of Clinical Decision Making Complexity Score   Occupational Profile:   Charla Patrick is a 63 y.o. female who was Independent with all ADL/IADL prior to onset of symptoms/injury . Pt is currently  reporting Max difficulty with LUE use affecting her daily functional abilities. Her main goal for therapy is regain Independent use of LUE.    Comorbidities:     has a past medical history of Allergic rhinitis, Diabetes mellitus, type 2, History of colon polyps, Hyperlipidemia, and Psoriatic arthritis.  Medical and Therapy History Review:   Expanded               Performance Deficits    Physical:  Joint Mobility  Muscle Power/Strength  Muscle Endurance  Edema   Strength  Pain    Cognitive:  No Deficits    Psychosocial:    No Deficits     Clinical Decision Making:  moderate    Assessment Process:  Detailed Assessments    Modification/Need for Assistance:  Minimal-Moderate Modifications/Assistance    Intervention Selection:  Several Treatment Options       moderate  Based on PMHX, co morbidities , data from assessments and functional level of assistance required with task and clinical presentation directly impacting function.       The following goals were discussed with the patient and patient is in agreement with them as to be addressed in the treatment plan.     Goals:     Short Term Goals: (30 days, 6/8/2023 and/or 10th visit) unless otherwise noted below.  1. Pt will be independent with HEP in 2 visits.  2. Pt will report decreased pain to a 5/10 at worst in LUE with ADLs in order to increase function/use of UE.   3. Pt will increase AROM wrist extension by 10 degrees (to 70 degrees) to increase function for ADL/IADL.    Long Term Goals: (by discharge)  1. Pt will report decreased pain to 1-2/10 with ADLs to allow for increased function/use of UE.   2. Pt will exhibit WNL AROM of  L Wrist to allow for Independent use of for all ADL/IADL tasks.  3. Pt will exhibit WNL   strength to allow a firm grasp during ADL/IADL (cooking utensils, tool use, carrying groceries, steering wheel, etc.)  4.Pt will return to OF with all ADL/IADL, leisure and caregiver tasks.    Plan   Certification Period/Plan of care expiration:  5/9/2023 to 8/07/2023 with POC expiring on 6/19/2023    Outpatient Occupational Therapy 2-3 times weekly through current poc expiration date, to  include the following interventions:     Moist heat, cold packs, paraffin, fluidotherapy, US, edema control, scar mobilization/scar massage, manual therapy/joint mobilizations,A/PROM, therapeutic exercises/activities, strengthening, desensitization/sensory re-education, orthotic fitting/fabrication/training  PRN, joint protections/energry conservation/adaptive equipment/activity modification HEP/education as well as any other treatments deemed necessary based on the patient's needs or progress.     Pt may be discharged prior to poc expiration date if all goals/desired outcome met or if max rehabilitation potential has been achieved.    Updates Next Visit: To review HEP understanding and compliance and progress with OT tx as tolerated.    VIC Heard, CHADT    I CERTIFY THE NEED FOR THESE SERVICES FURNISHED UNDER THIS PLAN OF TREATMENT AND WHILE UNDER MY CARE    Physician's comments prn:

## 2023-05-13 ENCOUNTER — PATIENT MESSAGE (OUTPATIENT)
Dept: RHEUMATOLOGY | Facility: CLINIC | Age: 63
End: 2023-05-13
Payer: OTHER GOVERNMENT

## 2023-05-15 NOTE — PROGRESS NOTES
Occupational Therapy Daily Treatment Note     Visit Date: 5/16/2023  Name: Charla Patrick  Clinic Number: [id number]    Therapy Diagnosis:   Encounter Diagnoses   Name Primary?    Stiffness of wrist joint, left Yes    Pain in left elbow     Weakness of left upper extremity     Impaired mobility and ADLs      Physician: Tristin Jordan PA-C    Physician orders: Physician Orders: Note:    Patient has lateral epicondylitis symptoms of the left elbow.  Patient requires occupational therapy to decrease the tenderness and increase the function of the left elbow/arm.       Duration:  6 weeks      Frequency:  2-3 times per week      Please work on range of motion, stretching, edema control, and therapeutic modalities (such as thermal therapies, E stim, and ultrasound, etc.).  Thanks!        Medical Diagnosis: Injury of left elbow, initial encounter [S59.902A]   Lateral epicondylitis of left elbow [M77.12]     Evaluation Date: 5/9/2023  Insurance Authorization period Expiration:  Calendar year  Plan of Care Expiration Period: 6 weeks = 6/19/2023  Next Re-assessment: 30 days (6/08/2023) and/or 10th visit  Date of Return Referring Provider 6/14/2023     Visit # / Visits Authortized: 2 / 20  # No shows 0 / # Cancellations: 0  Time In:  1515  Time Out:  1556  Total Billable Time:  41 minutes     Precautions: Standard  Surgery: N/A                            S/P: approx 4 months    Subjective     Pt reports: No new symptoms or complaints  she was compliant with HEP.   Response to previous treatment:Good  Functional change: None new reported    Pain:  Functional Pain Scale Rating 0-10:   4-5/10 on average  0/10 at best  10/10 at worst  Location: L    Description: Aching, Throbbing, Sharp, and Stabbing   Aggravating Factors: Lifting, opening, carrying purse  Easing Factors: Ice helps    Objective   MEASUREMENTS     CMS Impairment/Limitation/Restriction for FOTO Elbow Survey     Therapist reviewed FOTO scores for Charla Patrick  on 5/9/2023.   FOTO documents entered into [employer] - see Media section.     Limitation Score: 35%        Observation/Inspection/Wound/Incision/Scar   Mild to moderate edema.     Sensation: Grossly WNL,     Edema:            Circumferential (in cm) L R   Proximal Wrist Crease 17.0 16.6   MPs NT NT   Mid P1 of  Long Finger NT NT      AROM Hand: Tip to palm/DPC digits 1-5 (in cm) WNL         AROM Wrist/Forearm: 5/16/2023                Left              Right   Wrist Ext/Flex:  66 (+6) 0/90° Ext/Flex:  75/90°           Forearm Pron/Sup  WNL ° Pro/Sup: WNL  °      AROM Elbow:                 Left              Right   Elbow Ext/Flex  +5/WNL° Ext/Flex:  +5/WNL°         Manual Muscle Test: NT                                       and Pinch Strength: NTat this time due to current pain level         Special and/or Standardized Tests:  NT      Treatment     Charla received the following supervised modalities after being cleared for contradictions for 5 minutes:   Moist heat to L elbow pre.    Charla received the following direct contact modalities after being cleared for contraindications for 8 minutes:  -Ultrasound: Pulsed at 50%, at 0.7 W/cm2 at 3 Mhz for 8 min at L lateral elbow    Charla received the following manual therapy techniques for 0 minutes:   -NT    Charla received therapeutic exercises for 8 minutes including:  Eccentrics with 2# DB wrist flex and ext 2x10 each    Charla participated in dynamic functional therapeutic activities to improve functional performance for 25  minutes, including:  Flexbar palm up/palm down, wrist flexion, wrist extension and unilateral pronation 3x10 each with yellow.   Wrist wheel 3 min flex/ext    Home Exercises and Education Provided     Education provided:   -  Cont per previous. Consider flexbar for home use    Written Home Exercises Provided:  Patient instructed to cont prior HEP.    Exercises were reviewed and Charal was able to demonstrate them prior to the end of the  session.  Charla demonstrated good  understanding of the education provided.     See EMR under Media for exercises provided today and/or prior visit.        Assessment     Pt would continue to benefit from skilled OT. Pt tolerated progression with Tx well this date. Cont per current POC.       - Progress towards goals:  STG #1 has been met.    Charla is progressing well towards her goals . Pt continues with good rehab potential.     Pt will continue to benefit from skilled outpatient occupational therapy to address the deficits listed in the problem list on initial evaluation in order to maximize pt's level of independence in the home and community.     Anticipated barriers to occupational therapy: None    Pt's spiritual, cultural and educational needs considered and pt agreeable to plan of care and goals.    Goals:  Short Term Goals: (30 days, 6/8/2023 and/or 10th visit) unless otherwise noted below.  1. Pt will be independent with HEP in 2 visits.  2. Pt will report decreased pain to a 5/10 at worst in LUE with ADLs in order to increase function/use of UE.   3. Pt will increase AROM wrist extension by 10 degrees (to 70 degrees) to increase function for ADL/IADL.     Long Term Goals: (by discharge)  1. Pt will report decreased pain to 1-2/10 with ADLs to allow for increased function/use of UE.   2. Pt will exhibit WNL AROM of  L Wrist to allow for Independent use of for all ADL/IADL tasks.  3. Pt will exhibit WNL   strength to allow a firm grasp during ADL/IADL (cooking utensils, tool use, carrying groceries, steering wheel, etc.)  4.Pt will return to PLOF with all ADL/IADL, leisure and caregiver ta    Plan   Continue Occupational Therapy 2-3 times per week through current poc expiration date of 6/19/2023, in order to to decrease pain and edema, and increase A/PROM, strength, and functional use of L upper extremity.    Updates/Grading for next session:  Progress with OT as tolerated.      Gurjit Keen,  [place]

## 2023-05-16 ENCOUNTER — CLINICAL SUPPORT (OUTPATIENT)
Dept: REHABILITATION | Facility: HOSPITAL | Age: 63
End: 2023-05-16
Payer: OTHER GOVERNMENT

## 2023-05-16 DIAGNOSIS — M25.632 STIFFNESS OF WRIST JOINT, LEFT: Primary | ICD-10-CM

## 2023-05-16 DIAGNOSIS — M25.522 PAIN IN LEFT ELBOW: ICD-10-CM

## 2023-05-16 DIAGNOSIS — Z78.9 IMPAIRED MOBILITY AND ADLS: ICD-10-CM

## 2023-05-16 DIAGNOSIS — R29.898 WEAKNESS OF LEFT UPPER EXTREMITY: ICD-10-CM

## 2023-05-16 DIAGNOSIS — Z74.09 IMPAIRED MOBILITY AND ADLS: ICD-10-CM

## 2023-05-16 PROCEDURE — 97110 THERAPEUTIC EXERCISES: CPT | Mod: PO

## 2023-05-16 PROCEDURE — 97530 THERAPEUTIC ACTIVITIES: CPT | Mod: PO

## 2023-05-17 ENCOUNTER — PATIENT MESSAGE (OUTPATIENT)
Dept: REHABILITATION | Facility: HOSPITAL | Age: 63
End: 2023-05-17
Payer: OTHER GOVERNMENT

## 2023-05-18 ENCOUNTER — E-VISIT (OUTPATIENT)
Dept: FAMILY MEDICINE | Facility: CLINIC | Age: 63
End: 2023-05-18
Payer: OTHER GOVERNMENT

## 2023-05-18 ENCOUNTER — PATIENT MESSAGE (OUTPATIENT)
Dept: FAMILY MEDICINE | Facility: CLINIC | Age: 63
End: 2023-05-18
Payer: OTHER GOVERNMENT

## 2023-05-18 DIAGNOSIS — U07.1 COVID-19 VIRUS INFECTION: Primary | ICD-10-CM

## 2023-05-18 PROCEDURE — 99421 PR E&M, ONLINE DIGIT, EST, < 7 DAYS, 5-10 MINS: ICD-10-PCS | Mod: ,,, | Performed by: INTERNAL MEDICINE

## 2023-05-18 PROCEDURE — 99421 OL DIG E/M SVC 5-10 MIN: CPT | Mod: ,,, | Performed by: INTERNAL MEDICINE

## 2023-05-18 NOTE — PROGRESS NOTES
Patient ID: Charla Patrick is a 63 y.o. female.    Chief Complaint: COVID-19 Concerns    The patient initiated a request through [employer] on 5/18/2023 for evaluation and management with a chief complaint of COVID-19 Concerns     I evaluated the questionnaire submission on 5/18/23.    [facility] Peq Evisit Upper Respitatory/Cough Questionnaire    5/18/2023  8:42 AM CDT - Filed by Patient   Do you agree to participate in an E-Visit? Yes   If you have any of the following symptoms, please present to your local ER or call 911:  I acknowledge   What is the main issue that you would like for your doctor to address today? Covid   Are you able to take your vital signs? Yes   Systolic Blood Pressure: 138   Diastolic Blood Pressure: 76   Weight: 183   Height: 65   Pulse: 67   Temperature: 98.6   Respiration rate:    Pulse Oxygen: 96   What symptoms do you currently have?  Cough;  Headache;  Nasal Congestion;  New loss of taste or smell;  Muscle or body aches;  Runny nose   Describe your cough: Productive (containing mucus)   Describe the mucus: White   Have you ever smoked? I have smoked in the past   Have you had a fever? Yes   What has been the range of your fever? 100.4 or greater   When did your symptoms first appear? 5/17/2023   In the last two weeks, have you been in close contact with someone who has COVID-19 or the Flu? Yes , Covid-19   In the last two weeks, have you worked or volunteered in a healthcare facility or as a ? Healthcare facilities include a hospital, medical or dental clinic, long-term care facility, or nursing home No   Do you live in a long-term care facility, nursing home, group home, or homeless shelter? No   List what you have done or taken to help your symptoms. Nothing yet. Just took covid test this am   How severe are your symptoms? Moderate   Have your symptoms improved since they first appeared? Worse   Have you taken an at home Covid test? Yes   What were the results? Positive   Have  you taken a Flu test? No   Have you been fully vaccinated for COVID? (2 Pfizer, 2 Moderna or 1 Peter & Peter vaccine injections) Yes   Have you received a booster? Yes   Have you recieved a Flu shot? Yes   When did you recieve your Flu shot? 11/9/2022   Do you have transportation to get tested for COVID if it is indicated and ordered for you at an [facility] location? Yes   Provide any information you feel is important to your history not asked above  has covid. Not sure about flu shot date   Please attach any relevant images or files          Recent Labs Obtained:  No visits with results within 7 Day(s) from this visit.   Latest known visit with results is:   Lab Visit on 02/22/2023   Component Date Value Ref Range Status    WBC 02/22/2023 5.81  3.90 - 12.70 K/uL Final    RBC 02/22/2023 4.42  4.00 - 5.40 M/uL Final    Hemoglobin 02/22/2023 12.8  12.0 - 16.0 g/dL Final    Hematocrit 02/22/2023 41.3  37.0 - 48.5 % Final    MCV 02/22/2023 93  82 - 98 fL Final    MCH 02/22/2023 29.0  27.0 - 31.0 pg Final    MCHC 02/22/2023 31.0 (L)  32.0 - 36.0 g/dL Final    RDW 02/22/2023 13.9  11.5 - 14.5 % Final    Platelets 02/22/2023 239  150 - 450 K/uL Final    MPV 02/22/2023 11.5  9.2 - 12.9 fL Final    Immature Granulocytes 02/22/2023 0.3  0.0 - 0.5 % Final    Gran # (ANC) 02/22/2023 3.4  1.8 - 7.7 K/uL Final    Immature Grans (Abs) 02/22/2023 0.02  0.00 - 0.04 K/uL Final    Comment: Mild elevation in immature granulocytes is non specific and   can be seen in a variety of conditions including stress response,   acute inflammation, trauma and pregnancy. Correlation with other   laboratory and clinical findings is essential.      Lymph # 02/22/2023 1.6  1.0 - 4.8 K/uL Final    Mono # 02/22/2023 0.6  0.3 - 1.0 K/uL Final    Eos # 02/22/2023 0.2  0.0 - 0.5 K/uL Final    Baso # 02/22/2023 0.05  0.00 - 0.20 K/uL Final    nRBC 02/22/2023 0  0 /100 WBC Final    Gran % 02/22/2023 58.4  38.0 - 73.0 % Final    Lymph % 02/22/2023  28.1  18.0 - 48.0 % Final    Mono % 02/22/2023 9.5  4.0 - 15.0 % Final    Eosinophil % 02/22/2023 2.8  0.0 - 8.0 % Final    Basophil % 02/22/2023 0.9  0.0 - 1.9 % Final    Differential Method 02/22/2023 Automated   Final    Sodium 02/22/2023 141  136 - 145 mmol/L Final    Potassium 02/22/2023 4.5  3.5 - 5.1 mmol/L Final    Chloride 02/22/2023 106  95 - 110 mmol/L Final    CO2 02/22/2023 27  23 - 29 mmol/L Final    Glucose 02/22/2023 134 (H)  70 - 110 mg/dL Final    BUN 02/22/2023 18  8 - 23 mg/dL Final    Creatinine 02/22/2023 0.9  0.5 - 1.4 mg/dL Final    Calcium 02/22/2023 9.7  8.7 - 10.5 mg/dL Final    Total Protein 02/22/2023 6.9  6.0 - 8.4 g/dL Final    Albumin 02/22/2023 4.0  3.5 - 5.2 g/dL Final    Total Bilirubin 02/22/2023 0.3  0.1 - 1.0 mg/dL Final    Comment: For infants and newborns, interpretation of results should be based  on gestational age, weight and in agreement with clinical  observations.    Premature Infant recommended reference ranges:  Up to 24 hours.............<8.0 mg/dL  Up to 48 hours............<12.0 mg/dL  3-5 days..................<15.0 mg/dL  6-29 days.................<15.0 mg/dL      Alkaline Phosphatase 02/22/2023 87  55 - 135 U/L Final    AST 02/22/2023 23  10 - 40 U/L Final    ALT 02/22/2023 33  10 - 44 U/L Final    Anion Gap 02/22/2023 8  8 - 16 mmol/L Final    eGFR 02/22/2023 >60.0  >60 mL/min/1.73 m^2 Final    Sed Rate 02/22/2023 9  0 - 36 mm/Hr Final    CRP 02/22/2023 4.8  0.0 - 8.2 mg/L Final       Encounter Diagnosis   Name Primary?    COVID-19 virus infection Yes        No orders of the defined types were placed in this encounter.     Medications Ordered This Encounter   Medications    nirmatrelvir-ritonavir 300 mg (150 mg x 2)-100 mg copackaged tablets (EUA)     Sig: Take 3 tablets by mouth 2 (two) times daily for 5 days. Each dose contains 2 nirmatrelvir (pink tablets) and 1 ritonavir (white tablet). Take all 3 tablets together     Dispense:  30 tablet     Refill:  0         No follow-ups on file.      DO NOT take Simvastatin while taking the Paxlovid     E-Visit Time Trackin minutes

## 2023-05-19 ENCOUNTER — PATIENT MESSAGE (OUTPATIENT)
Dept: FAMILY MEDICINE | Facility: CLINIC | Age: 63
End: 2023-05-19
Payer: OTHER GOVERNMENT

## 2023-05-21 ENCOUNTER — TELEPHONE (OUTPATIENT)
Dept: FAMILY MEDICINE | Facility: CLINIC | Age: 63
End: 2023-05-21
Payer: OTHER GOVERNMENT

## 2023-05-21 NOTE — TELEPHONE ENCOUNTER
I received a warning that she should not be taking both tizanidine and famotidine due to the potential elevation in tizanidine levels.  If her acid reflux is that is significant, I would like to switch her to a medicine such as pantoprazole.

## 2023-05-22 ENCOUNTER — PATIENT MESSAGE (OUTPATIENT)
Dept: FAMILY MEDICINE | Facility: CLINIC | Age: 63
End: 2023-05-22
Payer: OTHER GOVERNMENT

## 2023-05-23 ENCOUNTER — PATIENT MESSAGE (OUTPATIENT)
Dept: FAMILY MEDICINE | Facility: CLINIC | Age: 63
End: 2023-05-23
Payer: OTHER GOVERNMENT

## 2023-05-25 ENCOUNTER — PATIENT MESSAGE (OUTPATIENT)
Dept: FAMILY MEDICINE | Facility: CLINIC | Age: 63
End: 2023-05-25
Payer: OTHER GOVERNMENT

## 2023-05-30 ENCOUNTER — LAB VISIT (OUTPATIENT)
Dept: LAB | Facility: HOSPITAL | Age: 63
End: 2023-05-30
Attending: INTERNAL MEDICINE
Payer: OTHER GOVERNMENT

## 2023-05-30 ENCOUNTER — CLINICAL SUPPORT (OUTPATIENT)
Dept: REHABILITATION | Facility: HOSPITAL | Age: 63
End: 2023-05-30
Payer: OTHER GOVERNMENT

## 2023-05-30 DIAGNOSIS — L40.50 PSORIATIC ARTHRITIS: ICD-10-CM

## 2023-05-30 DIAGNOSIS — M25.632 STIFFNESS OF WRIST JOINT, LEFT: Primary | ICD-10-CM

## 2023-05-30 DIAGNOSIS — M25.522 PAIN IN LEFT ELBOW: ICD-10-CM

## 2023-05-30 DIAGNOSIS — R29.898 WEAKNESS OF LEFT UPPER EXTREMITY: ICD-10-CM

## 2023-05-30 DIAGNOSIS — Z78.9 IMPAIRED MOBILITY AND ADLS: ICD-10-CM

## 2023-05-30 DIAGNOSIS — D84.9 IMMUNOSUPPRESSION: ICD-10-CM

## 2023-05-30 DIAGNOSIS — Z74.09 IMPAIRED MOBILITY AND ADLS: ICD-10-CM

## 2023-05-30 DIAGNOSIS — Z79.899 HIGH RISK MEDICATIONS (NOT ANTICOAGULANTS) LONG-TERM USE: ICD-10-CM

## 2023-05-30 DIAGNOSIS — E11.9 TYPE 2 DIABETES MELLITUS WITHOUT COMPLICATION, WITHOUT LONG-TERM CURRENT USE OF INSULIN: ICD-10-CM

## 2023-05-30 LAB
ALBUMIN SERPL BCP-MCNC: 3.9 G/DL (ref 3.5–5.2)
ALBUMIN/CREAT UR: NORMAL UG/MG (ref 0–30)
ALP SERPL-CCNC: 96 U/L (ref 55–135)
ALT SERPL W/O P-5'-P-CCNC: 53 U/L (ref 10–44)
ANION GAP SERPL CALC-SCNC: 11 MMOL/L (ref 8–16)
AST SERPL-CCNC: 43 U/L (ref 10–40)
BASOPHILS # BLD AUTO: 0.03 K/UL (ref 0–0.2)
BASOPHILS NFR BLD: 0.5 % (ref 0–1.9)
BILIRUB SERPL-MCNC: 0.4 MG/DL (ref 0.1–1)
BUN SERPL-MCNC: 17 MG/DL (ref 8–23)
CALCIUM SERPL-MCNC: 9.6 MG/DL (ref 8.7–10.5)
CHLORIDE SERPL-SCNC: 102 MMOL/L (ref 95–110)
CHOLEST SERPL-MCNC: 158 MG/DL (ref 120–199)
CHOLEST/HDLC SERPL: 3.5 {RATIO} (ref 2–5)
CO2 SERPL-SCNC: 24 MMOL/L (ref 23–29)
CREAT SERPL-MCNC: 0.9 MG/DL (ref 0.5–1.4)
CREAT UR-MCNC: 63 MG/DL (ref 15–325)
CRP SERPL-MCNC: 8.7 MG/L (ref 0–8.2)
DIFFERENTIAL METHOD: ABNORMAL
EOSINOPHIL # BLD AUTO: 0.1 K/UL (ref 0–0.5)
EOSINOPHIL NFR BLD: 2.6 % (ref 0–8)
ERYTHROCYTE [DISTWIDTH] IN BLOOD BY AUTOMATED COUNT: 14 % (ref 11.5–14.5)
ERYTHROCYTE [SEDIMENTATION RATE] IN BLOOD BY PHOTOMETRIC METHOD: 7 MM/HR (ref 0–36)
EST. GFR  (NO RACE VARIABLE): >60 ML/MIN/1.73 M^2
ESTIMATED AVG GLUCOSE: 143 MG/DL (ref 68–131)
GLUCOSE SERPL-MCNC: 164 MG/DL (ref 70–110)
HBA1C MFR BLD: 6.6 % (ref 4–5.6)
HCT VFR BLD AUTO: 42.9 % (ref 37–48.5)
HDLC SERPL-MCNC: 45 MG/DL (ref 40–75)
HDLC SERPL: 28.5 % (ref 20–50)
HGB BLD-MCNC: 13.5 G/DL (ref 12–16)
IMM GRANULOCYTES # BLD AUTO: 0.02 K/UL (ref 0–0.04)
IMM GRANULOCYTES NFR BLD AUTO: 0.4 % (ref 0–0.5)
LDLC SERPL CALC-MCNC: 61.8 MG/DL (ref 63–159)
LYMPHOCYTES # BLD AUTO: 1.4 K/UL (ref 1–4.8)
LYMPHOCYTES NFR BLD: 24.8 % (ref 18–48)
MCH RBC QN AUTO: 29.4 PG (ref 27–31)
MCHC RBC AUTO-ENTMCNC: 31.5 G/DL (ref 32–36)
MCV RBC AUTO: 94 FL (ref 82–98)
MICROALBUMIN UR DL<=1MG/L-MCNC: <5 UG/ML
MONOCYTES # BLD AUTO: 0.7 K/UL (ref 0.3–1)
MONOCYTES NFR BLD: 13.3 % (ref 4–15)
NEUTROPHILS # BLD AUTO: 3.2 K/UL (ref 1.8–7.7)
NEUTROPHILS NFR BLD: 58.4 % (ref 38–73)
NONHDLC SERPL-MCNC: 113 MG/DL
NRBC BLD-RTO: 0 /100 WBC
PLATELET # BLD AUTO: 204 K/UL (ref 150–450)
PMV BLD AUTO: 11.6 FL (ref 9.2–12.9)
POTASSIUM SERPL-SCNC: 4.3 MMOL/L (ref 3.5–5.1)
PROT SERPL-MCNC: 7.1 G/DL (ref 6–8.4)
RBC # BLD AUTO: 4.59 M/UL (ref 4–5.4)
SODIUM SERPL-SCNC: 137 MMOL/L (ref 136–145)
TRIGL SERPL-MCNC: 256 MG/DL (ref 30–150)
WBC # BLD AUTO: 5.48 K/UL (ref 3.9–12.7)

## 2023-05-30 PROCEDURE — 36415 COLL VENOUS BLD VENIPUNCTURE: CPT | Mod: PO | Performed by: INTERNAL MEDICINE

## 2023-05-30 PROCEDURE — 80061 LIPID PANEL: CPT | Performed by: INTERNAL MEDICINE

## 2023-05-30 PROCEDURE — 97530 THERAPEUTIC ACTIVITIES: CPT | Mod: PO

## 2023-05-30 PROCEDURE — 80053 COMPREHEN METABOLIC PANEL: CPT | Performed by: INTERNAL MEDICINE

## 2023-05-30 PROCEDURE — 83036 HEMOGLOBIN GLYCOSYLATED A1C: CPT | Performed by: INTERNAL MEDICINE

## 2023-05-30 PROCEDURE — 86140 C-REACTIVE PROTEIN: CPT | Performed by: INTERNAL MEDICINE

## 2023-05-30 PROCEDURE — 85025 COMPLETE CBC W/AUTO DIFF WBC: CPT | Performed by: INTERNAL MEDICINE

## 2023-05-30 PROCEDURE — 82570 ASSAY OF URINE CREATININE: CPT | Performed by: INTERNAL MEDICINE

## 2023-05-30 PROCEDURE — 85652 RBC SED RATE AUTOMATED: CPT | Performed by: INTERNAL MEDICINE

## 2023-05-30 PROCEDURE — 97110 THERAPEUTIC EXERCISES: CPT | Mod: PO

## 2023-05-30 NOTE — PROGRESS NOTES
Occupational Therapy Daily Treatment Note     Visit Date: 5/30/2023  Name: Charla Patrick  Clinic Number: [id number]    Therapy Diagnosis:   Encounter Diagnoses   Name Primary?    Stiffness of wrist joint, left Yes    Pain in left elbow     Weakness of left upper extremity     Impaired mobility and ADLs      Physician: Tristin Jordan PA-C    Physician orders: Physician Orders: Note:    Patient has lateral epicondylitis symptoms of the left elbow.  Patient requires occupational therapy to decrease the tenderness and increase the function of the left elbow/arm.       Duration:  6 weeks      Frequency:  2-3 times per week      Please work on range of motion, stretching, edema control, and therapeutic modalities (such as thermal therapies, E stim, and ultrasound, etc.).  Thanks!        Medical Diagnosis: Injury of left elbow, initial encounter [S59.902A]   Lateral epicondylitis of left elbow [M77.12]     Evaluation Date: 5/9/2023  Insurance Authorization period Expiration:  Calendar year  Plan of Care Expiration Period: 6 weeks = 6/19/2023  Next Re-assessment: 30 days (6/08/2023) and/or 10th visit  Date of Return Referring Provider 6/14/2023     Visit # / Visits Authortized: 3 / 20  # No shows 0 / # Cancellations: 0  Time In:  1046  Time Out: 1126  Total Billable Time:  40 minutes     Precautions: Standard  Surgery: N/A                            S/P: approx 4 months    Subjective     Pt reports: No new symptoms or complaints  she was compliant with HEP.   Response to previous treatment:Good  Functional change: None new reported    Pain:  Functional Pain Scale Rating 0-10:   3/10 on average  0/10 at best  6/10 at worst  Location: L    Description: Aching, Throbbing, Sharp  Aggravating Factors: Lifting, opening, carrying purse  Easing Factors: Ice helps    Objective   MEASUREMENTS     CMS Impairment/Limitation/Restriction for FOTO Elbow Survey     Therapist reviewed FOTO scores for Charla Patrick on 5/9/2023.    FOTO documents entered into [employer] - see Media section.     Limitation Score: 35%        Observation/Inspection/Wound/Incision/Scar   Mild to moderate edema.     Sensation: Grossly WNL,     Edema:            Circumferential (in cm) L R   Proximal Wrist Crease 17.0 16.6   MPs NT NT   Mid P1 of  Long Finger NT NT      AROM Hand: Tip to palm/DPC digits 1-5 (in cm) WNL         AROM Wrist/Forearm: 5/16/2023                Left              Right   Wrist Ext/Flex:  66 (+6) 0/90° Ext/Flex:  75/90°           Forearm Pron/Sup  WNL ° Pro/Sup: WNL  °      AROM Elbow:                 Left              Right   Elbow Ext/Flex  +5/WNL° Ext/Flex:  +5/WNL°         Manual Muscle Test: NT                                       and Pinch Strength: NTat this time due to current pain level         Special and/or Standardized Tests:  NT      Treatment     Charla received the following supervised modalities after being cleared for contradictions for 4 minutes:   Moist heat to L elbow pre.    Charla received the following direct contact modalities after being cleared for contraindications for 0 minutes:  -Ultrasound: Pulsed at 50%, at 0.7 W/cm2 at 3 Mhz for 8 min at L lateral elbow (NT)    Charla received the following manual therapy techniques for 0 minutes:   -NT    Charla received therapeutic exercises for 10 minutes including:  Eccentrics with 2# DB wrist flex and ext 2x10 each  Wrist ext stretches 3 ways 2x20 each    Charla participated in dynamic functional therapeutic activities to improve functional performance for 30 minutes, including:  Flexbar palm up/palm down, wrist flexion, wrist extension and unilateral pronation 3x10 each with yellow.   Wrist wheel 3 min flex/ext  Jux-A-Cisor 3 min    Home Exercises and Education Provided     Education provided:   -  Cont per previous.    Written Home Exercises Provided:  Patient instructed to cont prior HEP.    Exercises were reviewed and Charla was able to demonstrate them prior  to the end of the session.  Charla demonstrated good  understanding of the education provided.     See EMR under Media for exercises provided today and/or prior visit.        Assessment     Pt would continue to benefit from skilled OT. Some decreased pain reported L elbow. Cont per current POC.       - Progress towards goals:  STG #1 has been met.    Charla is progressing well towards her goals . Pt continues with good rehab potential.     Pt will continue to benefit from skilled outpatient occupational therapy to address the deficits listed in the problem list on initial evaluation in order to maximize pt's level of independence in the home and community.     Anticipated barriers to occupational therapy: None    Pt's spiritual, cultural and educational needs considered and pt agreeable to plan of care and goals.    Goals:  Short Term Goals: (30 days, 6/8/2023 and/or 10th visit) unless otherwise noted below.  1. Pt will be independent with HEP in 2 visits.  2. Pt will report decreased pain to a 5/10 at worst in LUE with ADLs in order to increase function/use of UE.   3. Pt will increase AROM wrist extension by 10 degrees (to 70 degrees) to increase function for ADL/IADL.     Long Term Goals: (by discharge)  1. Pt will report decreased pain to 1-2/10 with ADLs to allow for increased function/use of UE.   2. Pt will exhibit WNL AROM of  L Wrist to allow for Independent use of for all ADL/IADL tasks.  3. Pt will exhibit WNL   strength to allow a firm grasp during ADL/IADL (cooking utensils, tool use, carrying groceries, steering wheel, etc.)  4.Pt will return to PLOF with all ADL/IADL, leisure and caregiver ta    Plan   Continue Occupational Therapy 2-3 times per week through current poc expiration date of 6/19/2023, in order to to decrease pain and edema, and increase A/PROM, strength, and functional use of L upper extremity.    Updates/Grading for next session:  Progress with OT as tolerated.      Gurjit  VIC Keen, CHADT

## 2023-06-01 ENCOUNTER — PATIENT MESSAGE (OUTPATIENT)
Dept: FAMILY MEDICINE | Facility: CLINIC | Age: 63
End: 2023-06-01
Payer: OTHER GOVERNMENT

## 2023-06-02 ENCOUNTER — CLINICAL SUPPORT (OUTPATIENT)
Dept: REHABILITATION | Facility: HOSPITAL | Age: 63
End: 2023-06-02
Payer: OTHER GOVERNMENT

## 2023-06-02 ENCOUNTER — E-VISIT (OUTPATIENT)
Dept: FAMILY MEDICINE | Facility: CLINIC | Age: 63
End: 2023-06-02
Payer: OTHER GOVERNMENT

## 2023-06-02 DIAGNOSIS — Z74.09 IMPAIRED MOBILITY AND ADLS: ICD-10-CM

## 2023-06-02 DIAGNOSIS — Z78.9 IMPAIRED MOBILITY AND ADLS: ICD-10-CM

## 2023-06-02 DIAGNOSIS — J01.40 ACUTE NON-RECURRENT PANSINUSITIS: Primary | ICD-10-CM

## 2023-06-02 DIAGNOSIS — R29.898 WEAKNESS OF LEFT UPPER EXTREMITY: ICD-10-CM

## 2023-06-02 DIAGNOSIS — M25.632 STIFFNESS OF WRIST JOINT, LEFT: Primary | ICD-10-CM

## 2023-06-02 DIAGNOSIS — M25.522 PAIN IN LEFT ELBOW: ICD-10-CM

## 2023-06-02 PROCEDURE — 97530 THERAPEUTIC ACTIVITIES: CPT | Mod: PO

## 2023-06-02 PROCEDURE — 99421 OL DIG E/M SVC 5-10 MIN: CPT | Mod: ,,, | Performed by: INTERNAL MEDICINE

## 2023-06-02 PROCEDURE — 97110 THERAPEUTIC EXERCISES: CPT | Mod: PO

## 2023-06-02 PROCEDURE — 99421 PR E&M, ONLINE DIGIT, EST, < 7 DAYS, 5-10 MINS: ICD-10-PCS | Mod: ,,, | Performed by: INTERNAL MEDICINE

## 2023-06-02 RX ORDER — AMOXICILLIN AND CLAVULANATE POTASSIUM 875; 125 MG/1; MG/1
1 TABLET, FILM COATED ORAL EVERY 12 HOURS
Qty: 14 TABLET | Refills: 0 | Status: SHIPPED | OUTPATIENT
Start: 2023-06-02 | End: 2023-06-09

## 2023-06-02 NOTE — PROGRESS NOTES
Patient ID: Charla Patrick is a 63 y.o. female.    Chief Complaint: Sinusitis    The patient initiated a request through [employer] on 6/2/2023 for evaluation and management with a chief complaint of Sinusitis     I evaluated the questionnaire submission on 6/2/23.    [facility] Peq Evisit Upper Respitatory/Cough Questionnaire    6/2/2023  7:23 AM CDT - Filed by Patient   Do you agree to participate in an E-Visit? Yes   If you have any of the following symptoms, please present to your local ER or call 911:  I acknowledge   What is the main issue that you would like for your doctor to address today? Sinus congestion   Are you able to take your vital signs? Yes   Systolic Blood Pressure: 124   Diastolic Blood Pressure: 65   Weight: 183   Height: 65   Pulse: 71   Temperature: 197.8   Respiration rate:    Pulse Oxygen: 71   What symptoms do you currently have?  Cough;  Nasal Congestion;  New loss of taste or smell   Describe your cough: Productive (containing mucus)   Describe the mucus: Thick   Have you ever smoked? I have smoked in the past   Have you had a fever? No   When did your symptoms first appear? 5/17/2023   In the last two weeks, have you been in close contact with someone who has COVID-19 or the Flu? Yes , Covid-19   In the last two weeks, have you worked or volunteered in a healthcare facility or as a ? Healthcare facilities include a hospital, medical or dental clinic, long-term care facility, or nursing home No   Do you live in a long-term care facility, nursing home, group home, or homeless shelter? No   List what you have done or taken to help your symptoms. Daytime and nightime sinus meds   How severe are your symptoms? Moderate   Have your symptoms improved since they first appeared? No change   Have you taken an at home Covid test? Yes   What were the results? Positive   Have you taken a Flu test? No   Have you been fully vaccinated for COVID? (2 Pfizer, 2 Moderna or 1 [employer]  vaccine injections) Yes   Have you received a booster? Yes   Have you recieved a Flu shot? Yes   When did you recieve your Flu shot? 10/13/2022   Do you have transportation to get tested for COVID if it is indicated and ordered for you at an [facility] location? Yes   Provide any information you feel is important to your history not asked above Sinus cingrstion and cough never subsided since covid. Dont remember the date of last flu shot.   Please attach any relevant images or files          Recent Labs Obtained:  Lab Visit on 05/30/2023   Component Date Value Ref Range Status    Hemoglobin A1C 05/30/2023 6.6 (H)  4.0 - 5.6 % Final    Comment: ADA Screening Guidelines:  5.7-6.4%  Consistent with prediabetes  >or=6.5%  Consistent with diabetes    High levels of fetal hemoglobin interfere with the HbA1C  assay. Heterozygous hemoglobin variants (HbS, HgC, etc)do  not significantly interfere with this assay.   However, presence of multiple variants may affect accuracy.      Estimated Avg Glucose 05/30/2023 143 (H)  68 - 131 mg/dL Final    Cholesterol 05/30/2023 158  120 - 199 mg/dL Final    Comment: The National Cholesterol Education Program (NCEP) has set the  following guidelines (reference ranges) for Cholesterol:  Optimal.....................<200 mg/dL  Borderline High.............200-239 mg/dL  High........................> or = 240 mg/dL      Triglycerides 05/30/2023 256 (H)  30 - 150 mg/dL Final    Comment: The National Cholesterol Education Program (NCEP) has set the  following guidelines (reference values) for triglycerides:  Normal......................<150 mg/dL  Borderline High.............150-199 mg/dL  High........................200-499 mg/dL      HDL 05/30/2023 45  40 - 75 mg/dL Final    Comment: The National Cholesterol Education Program (NCEP) has set the  following guidelines (reference values) for HDL Cholesterol:  Low...............<40 mg/dL  Optimal...........>60 mg/dL      LDL Cholesterol 05/30/2023  61.8 (L)  63.0 - 159.0 mg/dL Final    Comment: The National Cholesterol Education Program (NCEP) has set the  following guidelines (reference values) for LDL Cholesterol:  Optimal.......................<130 mg/dL  Borderline High...............130-159 mg/dL  High..........................160-189 mg/dL  Very High.....................>190 mg/dL      HDL/Cholesterol Ratio 05/30/2023 28.5  20.0 - 50.0 % Final    Total Cholesterol/HDL Ratio 05/30/2023 3.5  2.0 - 5.0 Final    Non-HDL Cholesterol 05/30/2023 113  mg/dL Final    Comment: Risk category and Non-HDL cholesterol goals:  Coronary heart disease (CHD)or equivalent (10-year risk of CHD >20%):  Non-HDL cholesterol goal     <130 mg/dL  Two or more CHD risk factors and 10-year risk of CHD <= 20%:  Non-HDL cholesterol goal     <160 mg/dL  0 to 1 CHD risk factor:  Non-HDL cholesterol goal     <190 mg/dL      Microalbumin, Urine 05/30/2023 <5.0  ug/mL Final    Creatinine, Urine 05/30/2023 63.0  15.0 - 325.0 mg/dL Final    Microalb/Creat Ratio 05/30/2023 Unable to calculate  0.0 - 30.0 ug/mg Final    WBC 05/30/2023 5.48  3.90 - 12.70 K/uL Final    RBC 05/30/2023 4.59  4.00 - 5.40 M/uL Final    Hemoglobin 05/30/2023 13.5  12.0 - 16.0 g/dL Final    Hematocrit 05/30/2023 42.9  37.0 - 48.5 % Final    MCV 05/30/2023 94  82 - 98 fL Final    MCH 05/30/2023 29.4  27.0 - 31.0 pg Final    MCHC 05/30/2023 31.5 (L)  32.0 - 36.0 g/dL Final    RDW 05/30/2023 14.0  11.5 - 14.5 % Final    Platelets 05/30/2023 204  150 - 450 K/uL Final    MPV 05/30/2023 11.6  9.2 - 12.9 fL Final    Immature Granulocytes 05/30/2023 0.4  0.0 - 0.5 % Final    Gran # (ANC) 05/30/2023 3.2  1.8 - 7.7 K/uL Final    Immature Grans (Abs) 05/30/2023 0.02  0.00 - 0.04 K/uL Final    Comment: Mild elevation in immature granulocytes is non specific and   can be seen in a variety of conditions including stress response,   acute inflammation, trauma and pregnancy. Correlation with other   laboratory and clinical findings  is essential.      Lymph # 05/30/2023 1.4  1.0 - 4.8 K/uL Final    Mono # 05/30/2023 0.7  0.3 - 1.0 K/uL Final    Eos # 05/30/2023 0.1  0.0 - 0.5 K/uL Final    Baso # 05/30/2023 0.03  0.00 - 0.20 K/uL Final    nRBC 05/30/2023 0  0 /100 WBC Final    Gran % 05/30/2023 58.4  38.0 - 73.0 % Final    Lymph % 05/30/2023 24.8  18.0 - 48.0 % Final    Mono % 05/30/2023 13.3  4.0 - 15.0 % Final    Eosinophil % 05/30/2023 2.6  0.0 - 8.0 % Final    Basophil % 05/30/2023 0.5  0.0 - 1.9 % Final    Differential Method 05/30/2023 Automated   Final    Sodium 05/30/2023 137  136 - 145 mmol/L Final    Potassium 05/30/2023 4.3  3.5 - 5.1 mmol/L Final    Chloride 05/30/2023 102  95 - 110 mmol/L Final    CO2 05/30/2023 24  23 - 29 mmol/L Final    Glucose 05/30/2023 164 (H)  70 - 110 mg/dL Final    BUN 05/30/2023 17  8 - 23 mg/dL Final    Creatinine 05/30/2023 0.9  0.5 - 1.4 mg/dL Final    Calcium 05/30/2023 9.6  8.7 - 10.5 mg/dL Final    Total Protein 05/30/2023 7.1  6.0 - 8.4 g/dL Final    Albumin 05/30/2023 3.9  3.5 - 5.2 g/dL Final    Total Bilirubin 05/30/2023 0.4  0.1 - 1.0 mg/dL Final    Comment: For infants and newborns, interpretation of results should be based  on gestational age, weight and in agreement with clinical  observations.    Premature Infant recommended reference ranges:  Up to 24 hours.............<8.0 mg/dL  Up to 48 hours............<12.0 mg/dL  3-5 days..................<15.0 mg/dL  6-29 days.................<15.0 mg/dL      Alkaline Phosphatase 05/30/2023 96  55 - 135 U/L Final    AST 05/30/2023 43 (H)  10 - 40 U/L Final    ALT 05/30/2023 53 (H)  10 - 44 U/L Final    Anion Gap 05/30/2023 11  8 - 16 mmol/L Final    eGFR 05/30/2023 >60.0  >60 mL/min/1.73 m^2 Final    Sed Rate 05/30/2023 7  0 - 36 mm/Hr Final    CRP 05/30/2023 8.7 (H)  0.0 - 8.2 mg/L Final       Encounter Diagnosis   Name Primary?    Acute non-recurrent pansinusitis Yes        No orders of the defined types were placed in this encounter.      Medications Ordered This Encounter   Medications    amoxicillin-clavulanate 875-125mg (AUGMENTIN) 875-125 mg per tablet     Sig: Take 1 tablet by mouth every 12 (twelve) hours. for 7 days     Dispense:  14 tablet     Refill:  0        No follow-ups on file.    Augmentin for suspected sinus infection following COVID    E-Visit Time Trackin minutes

## 2023-06-04 ENCOUNTER — PATIENT MESSAGE (OUTPATIENT)
Dept: FAMILY MEDICINE | Facility: CLINIC | Age: 63
End: 2023-06-04
Payer: OTHER GOVERNMENT

## 2023-06-04 NOTE — TELEPHONE ENCOUNTER
Pt is referring to Elevated liver enzymes and triglycerides  Please see mychart message and advise

## 2023-06-06 ENCOUNTER — CLINICAL SUPPORT (OUTPATIENT)
Dept: REHABILITATION | Facility: HOSPITAL | Age: 63
End: 2023-06-06
Payer: OTHER GOVERNMENT

## 2023-06-06 DIAGNOSIS — Z78.9 IMPAIRED MOBILITY AND ADLS: ICD-10-CM

## 2023-06-06 DIAGNOSIS — M25.632 STIFFNESS OF WRIST JOINT, LEFT: Primary | ICD-10-CM

## 2023-06-06 DIAGNOSIS — M25.522 PAIN IN LEFT ELBOW: ICD-10-CM

## 2023-06-06 DIAGNOSIS — R29.898 WEAKNESS OF LEFT UPPER EXTREMITY: ICD-10-CM

## 2023-06-06 DIAGNOSIS — Z74.09 IMPAIRED MOBILITY AND ADLS: ICD-10-CM

## 2023-06-06 PROCEDURE — 97530 THERAPEUTIC ACTIVITIES: CPT | Mod: PO

## 2023-06-06 PROCEDURE — 97110 THERAPEUTIC EXERCISES: CPT | Mod: PO

## 2023-06-06 NOTE — PROGRESS NOTES
Occupational Therapy Daily Treatment Note     Visit Date: 6/6/2023  Name: Charla Patrick  Clinic Number: [id number]    Therapy Diagnosis:   No diagnosis found.      Physician: Tristin Jordan PA-C    Physician orders: Physician Orders: Note:    Patient has lateral epicondylitis symptoms of the left elbow.  Patient requires occupational therapy to decrease the tenderness and increase the function of the left elbow/arm.       Duration:  6 weeks      Frequency:  2-3 times per week      Please work on range of motion, stretching, edema control, and therapeutic modalities (such as thermal therapies, E stim, and ultrasound, etc.).  Thanks!        Medical Diagnosis: Injury of left elbow, initial encounter [S59.902A]   Lateral epicondylitis of left elbow [M77.12]     Evaluation Date: 5/9/2023  Insurance Authorization period Expiration:  Calendar year  Plan of Care Expiration Period: 6 weeks = 6/19/2023  Next Re-assessment: 30 days (6/08/2023) and/or 10th visit  Date of Return Referring Provider 6/14/2023     Visit # / Visits Authortized: 5 / 20  # No shows 0 / # Cancellations: 0  Time In: 0848  Time Out: 0928  Total Billable Time:  40 minutes     Precautions: Standard  Surgery: N/A                            S/P: approx 4 months    Subjective     Pt reports: No new symptoms or complaints  she was compliant with HEP.   Response to previous treatment:Good  Functional change: None new reported    Pain:  Functional Pain Scale Rating 0-10:   0/10 on average  0/10 at best  2-3/10 at worst  Location: L    Description: Aching, Throbbing, Sharp  Aggravating Factors: Lifting, opening, carrying purse  Easing Factors: Ice helps    Objective   MEASUREMENTS     CMS Impairment/Limitation/Restriction for FOTO Elbow Survey     Therapist reviewed FOTO scores for Charla Patrick on 5/9/2023.   FOTO documents entered into [employer] - see Media section.     Limitation Score: 35%        Observation/Inspection/Wound/Incision/Scar   Mild to  moderate edema.     Sensation: Grossly WNL,     Edema:            Circumferential (in cm) L R   Proximal Wrist Crease 17.0 16.6   MPs NT NT   Mid P1 of  Long Finger NT NT      AROM Hand: Tip to palm/DPC digits 1-5 (in cm) WNL         AROM Wrist/Forearm: 5/16/2023                Left              Right   Wrist Ext/Flex:  66 (+6) 0/90° Ext/Flex:  75/90°           Forearm Pron/Sup  WNL ° Pro/Sup: WNL  °      AROM Elbow:                 Left              Right   Elbow Ext/Flex  +5/WNL° Ext/Flex:  +5/WNL°         Manual Muscle Test: NT                                       and Pinch Strength: NTat this time due to current pain level         Special and/or Standardized Tests:  NT      Treatment     Charla received the following supervised modalities after being cleared for contradictions for 4 minutes:   Moist heat to L elbow pre.    Charla received the following direct contact modalities after being cleared for contraindications for 0 minutes:  -Ultrasound: Pulsed at 50%, at 0.7 W/cm2 at 3 Mhz for 8 min at L lateral elbow (NT)    Charla received the following manual therapy techniques for 0 minutes:   -NT    Charla received therapeutic exercises for 8 minutes including:  Eccentrics with 2# DB wrist flex and ext 2x10 each (NT)  Wrist ext stretches 3 ways 2x20 each  Pulleys 3 min  Prayer stretch 3x20 sec    Charla participated in dynamic functional therapeutic activities to improve functional performance for 30 minutes, including:  Flexbar palm up/palm down, wrist flexion, wrist extension and unilateral pronation 3x10 each with red.  Wrist wheel 2 min flex/ext  Handmaster 3x10 open and close  Isospheres 3 min.  Jux-A-Cisor 3 min (NT)    Home Exercises and Education Provided     Education provided:   -  Cont per previous.    Written Home Exercises Provided:  Patient instructed to cont prior HEP.    Exercises were reviewed and Charla was able to demonstrate them prior to the end of the session.  Charla  demonstrated good  understanding of the education provided.     See EMR under Media for exercises provided today and/or prior visit.        Assessment     Pt would continue to benefit from skilled OT. Pt tolerated progression with Tx well this date. Cont per current POC.     - Progress towards goals:  STG #1 has been met.    Charla is progressing well towards her goals . Pt continues with good rehab potential.     Pt will continue to benefit from skilled outpatient occupational therapy to address the deficits listed in the problem list on initial evaluation in order to maximize pt's level of independence in the home and community.     Anticipated barriers to occupational therapy: None    Pt's spiritual, cultural and educational needs considered and pt agreeable to plan of care and goals.    Goals:  Short Term Goals: (30 days, 6/8/2023 and/or 10th visit) unless otherwise noted below.  1. Pt will be independent with HEP in 2 visits.  2. Pt will report decreased pain to a 5/10 at worst in LUE with ADLs in order to increase function/use of UE.   3. Pt will increase AROM wrist extension by 10 degrees (to 70 degrees) to increase function for ADL/IADL.     Long Term Goals: (by discharge)  1. Pt will report decreased pain to 1-2/10 with ADLs to allow for increased function/use of UE.   2. Pt will exhibit WNL AROM of  L Wrist to allow for Independent use of for all ADL/IADL tasks.  3. Pt will exhibit WNL   strength to allow a firm grasp during ADL/IADL (cooking utensils, tool use, carrying groceries, steering wheel, etc.)  4.Pt will return to PLOF with all ADL/IADL, leisure and caregiver ta    Plan   Continue Occupational Therapy 2-3 times per week through current poc expiration date of 6/19/2023, in order to to decrease pain and edema, and increase A/PROM, strength, and functional use of L upper extremity.    Updates/Grading for next session:  Progress with OT as tolerated.      VIC Heard, CHADT

## 2023-06-30 ENCOUNTER — HOSPITAL ENCOUNTER (OUTPATIENT)
Dept: RADIOLOGY | Facility: HOSPITAL | Age: 63
Discharge: HOME | End: 2023-06-30
Attending: INTERNAL MEDICINE
Payer: OTHER GOVERNMENT

## 2023-06-30 ENCOUNTER — OFFICE VISIT (OUTPATIENT)
Dept: FAMILY MEDICINE | Facility: CLINIC | Age: 63
End: 2023-06-30
Payer: OTHER GOVERNMENT

## 2023-06-30 DIAGNOSIS — Z00.00 WELLNESS EXAMINATION: Primary | ICD-10-CM

## 2023-06-30 DIAGNOSIS — E11.9 TYPE 2 DIABETES MELLITUS WITHOUT COMPLICATION, WITHOUT LONG-TERM CURRENT USE OF INSULIN: ICD-10-CM

## 2023-06-30 DIAGNOSIS — E78.49 OTHER HYPERLIPIDEMIA: ICD-10-CM

## 2023-06-30 DIAGNOSIS — K21.9 GASTROESOPHAGEAL REFLUX DISEASE WITHOUT ESOPHAGITIS: ICD-10-CM

## 2023-06-30 DIAGNOSIS — Z12.31 OTHER SCREENING MAMMOGRAM: ICD-10-CM

## 2023-06-30 PROBLEM — Z12.11 SCREEN FOR COLON CANCER: Status: RESOLVED | Noted: 2021-08-10 | Resolved: 2023-06-30

## 2023-06-30 PROCEDURE — 99396 PREV VISIT EST AGE 40-64: CPT | Mod: S$PBB,,, | Performed by: INTERNAL MEDICINE

## 2023-06-30 PROCEDURE — 77067 SCR MAMMO BI INCL CAD: CPT | Mod: TC,PO

## 2023-06-30 PROCEDURE — 77067 SCR MAMMO BI INCL CAD: CPT | Mod: 26,,, | Performed by: RADIOLOGY

## 2023-06-30 PROCEDURE — 77067 MAMMO DIGITAL SCREENING BILAT WITH TOMO: ICD-10-PCS | Mod: 26,,, | Performed by: RADIOLOGY

## 2023-06-30 PROCEDURE — 99396 PR PREVENTIVE VISIT,EST,40-64: ICD-10-PCS | Mod: S$PBB,,, | Performed by: INTERNAL MEDICINE

## 2023-06-30 PROCEDURE — 99214 OFFICE O/P EST MOD 30 MIN: CPT | Mod: PBBFAC,PO | Performed by: INTERNAL MEDICINE

## 2023-06-30 PROCEDURE — 99999 PR PBB SHADOW E&M-EST. PATIENT-LVL IV: CPT | Mod: PBBFAC,,, | Performed by: INTERNAL MEDICINE

## 2023-06-30 PROCEDURE — 99999 PR PBB SHADOW E&M-EST. PATIENT-LVL IV: ICD-10-PCS | Mod: PBBFAC,,, | Performed by: INTERNAL MEDICINE

## 2023-06-30 PROCEDURE — 77063 MAMMO DIGITAL SCREENING BILAT WITH TOMO: ICD-10-PCS | Mod: 26,,, | Performed by: RADIOLOGY

## 2023-06-30 PROCEDURE — 77063 BREAST TOMOSYNTHESIS BI: CPT | Mod: 26,,, | Performed by: RADIOLOGY

## 2023-06-30 RX ORDER — DULAGLUTIDE 1.5 MG/.5ML
1.5 INJECTION, SOLUTION SUBCUTANEOUS
Qty: 12 PEN | Refills: 3 | Status: SHIPPED | OUTPATIENT
Start: 2023-06-30 | End: 2024-01-05

## 2023-06-30 RX ORDER — ROSUVASTATIN CALCIUM 5 MG/1
5 TABLET, COATED ORAL NIGHTLY
Qty: 90 TABLET | Refills: 3 | Status: SHIPPED | OUTPATIENT
Start: 2023-06-30 | End: 2024-06-29

## 2023-06-30 NOTE — PROGRESS NOTES
"Patient ID: Charla Patrick     Chief Complaint:   Chief Complaint   Patient presents with    Follow-up     6 month         HPI: Annual exam and Follow up for labs. Doing well overall.     Diabetes Controlled with med (trulicity) and stopped Glipizide. Will increase dose to get some more weight loss.     Hypertension Controlled with med.    Needs Mammo.     Liver Function Tests slightly elevated - switch to Rosuvastatin 5 mg / night. Stop Simvastatin and Monitor Labs.     LDL Controlled with med but Monitor Labs     Review of Systems       Negative     Objective:      Physical Exam   Physical Exam       Normal     Protective Sensation (w/ 10 gram monofilament):  Right: Intact  Left: Intact    Visual Inspection:  Normal -  Bilateral    Pedal Pulses:   Right: Present  Left: Present    Posterior Tibialis Pulses:   Right:Present  Left: Present    Vitals:   Vitals:    06/30/23 1439   BP: 120/68   Pulse: 66   SpO2: 96%   Weight: 80.3 kg (177 lb)   Height: 5' 5" (1.651 m)          Current Outpatient Medications:     ascorbic acid, vitamin C, (VITAMIN C) 500 MG tablet, Take 500 mg by mouth once daily., Disp: , Rfl:     aspirin 81 MG Chew, Take 81 mg by mouth once daily., Disp: , Rfl:     blood sugar diagnostic Strp, To check BG 2 times daily, to use with insurance preferred meter, Disp: 200 strip, Rfl: 3    cholecalciferol, vitamin D3, (VITAMIN D3) 50 mcg (2,000 unit) Cap capsule, Take 2,000 Units by mouth., Disp: , Rfl:     clobetasol 0.05% (TEMOVATE) 0.05 % Oint, Clobetasol propionate 0.05% once daily at night for 4 weeks, then alternate nights for 4 weeks, and then twice weekly for 4 weeks, Disp: 45 g, Rfl: 2    famotidine (PEPCID) 40 MG tablet, Take 1 tablet (40 mg total) by mouth once daily., Disp: 90 tablet, Rfl: 3    fluocinonide 0.05% (LIDEX) 0.05 % cream, Apply topically 2 (two) times daily., Disp: 120 g, Rfl: 3    folic acid (FOLVITE) 1 MG tablet, Take 1 tablet (1 mg total) by mouth once daily., Disp: 100 tablet, " Rfl: 3    loratadine (CLARITIN) 10 mg tablet, TAKE 1 TABLET DAILY, Disp: 90 tablet, Rfl: 3    methotrexate 2.5 MG Tab, Take 4 tablets (10 mg total) by mouth every 7 days., Disp: 48 tablet, Rfl: 1    pantoprazole (PROTONIX) 40 MG tablet, TAKE 1 TABLET DAILY, Disp: 90 tablet, Rfl: 1    sulfacetamide sodium-sulfur 10-5 % (w/w) Clsr, Use to wash face daily, Disp: 340 g, Rfl: 11    tiZANidine (ZANAFLEX) 4 MG tablet, Take 1 tablet (4 mg total) by mouth nightly as needed (spasm of muscle)., Disp: 90 tablet, Rfl: 3    turmeric root extract 500 mg Cap, Take 500 mg by mouth once daily at 6am., Disp: , Rfl:     ustekinumab (STELARA) 45 mg/0.5 mL Syrg syringe, Inject 45mg SQ on week, 0, 4 and then every 12 weeks., Disp: 1 each, Rfl: 5    vitamin B complex (SUPER B COMPLEX-B-12 ORAL), , Disp: , Rfl:     dulaglutide (TRULICITY) 1.5 mg/0.5 mL pen injector, Inject 1.5 mg into the skin every 7 days., Disp: 12 pen, Rfl: 3    estradioL (ESTRACE) 0.01 % (0.1 mg/gram) vaginal cream, Place 2 g vaginally every evening for 14 days, THEN 1 g every evening for 14 days, THEN 1 g twice a week. 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    Mixed conductive and sensorineural hearing loss of both ears 01/18/2023    Class 1 obesity due to excess calories with serious comorbidity and body mass index (BMI) of 30.0 to 30.9 in adult 01/18/2023    Palpitations 02/11/2021    PVC (premature ventricular contraction) 02/11/2021    Diabetes mellitus, type 2     Allergic rhinitis     History of colon polyps      Hyperlipidemia 08/21/2020    Type 2 diabetes mellitus, without long-term current use of insulin 08/20/2020    Psoriatic arthritis 08/20/2020    Gastroesophageal reflux disease without esophagitis 08/20/2020          Plan:       Charla Patrick  was seen today for follow-up and may need lab work.    Diagnoses and all orders for this visit:    Charla was seen today for follow-up.    Diagnoses and all orders for this visit:    Wellness examination    Other hyperlipidemia  -     rosuvastatin (CRESTOR) 5 MG tablet; Take 1 tablet (5 mg total) by mouth every evening.  -     Comprehensive Metabolic Panel; Future  -     Lipid Panel; Future  Monitor Labs     Type 2 diabetes mellitus without complication, without long-term current use of insulin  -     dulaglutide (TRULICITY) 1.5 mg/0.5 mL pen injector; Inject 1.5 mg into the skin every 7 days.  Controlled with med     Gastroesophageal reflux disease without esophagitis  Controlled with med

## 2023-07-11 DIAGNOSIS — L40.50 PSORIATIC ARTHRITIS: ICD-10-CM

## 2023-07-11 DIAGNOSIS — L40.9 PSORIASIS: ICD-10-CM

## 2023-07-13 RX ORDER — METHOTREXATE 2.5 MG/1
TABLET ORAL
Qty: 48 TABLET | Refills: 3 | Status: SHIPPED | OUTPATIENT
Start: 2023-07-13 | End: 2023-08-16 | Stop reason: SDUPTHER

## 2023-08-16 ENCOUNTER — OFFICE VISIT (OUTPATIENT)
Dept: RHEUMATOLOGY | Facility: CLINIC | Age: 63
End: 2023-08-16
Payer: OTHER GOVERNMENT

## 2023-08-16 ENCOUNTER — LAB VISIT (OUTPATIENT)
Dept: LAB | Facility: HOSPITAL | Age: 63
End: 2023-08-16
Attending: INTERNAL MEDICINE
Payer: OTHER GOVERNMENT

## 2023-08-16 VITALS
SYSTOLIC BLOOD PRESSURE: 120 MMHG | HEART RATE: 69 BPM | BODY MASS INDEX: 30.89 KG/M2 | WEIGHT: 185.44 LBS | DIASTOLIC BLOOD PRESSURE: 75 MMHG | HEIGHT: 65 IN

## 2023-08-16 DIAGNOSIS — Z79.899 HIGH RISK MEDICATIONS (NOT ANTICOAGULANTS) LONG-TERM USE: ICD-10-CM

## 2023-08-16 DIAGNOSIS — L40.50 PSORIATIC ARTHRITIS: ICD-10-CM

## 2023-08-16 DIAGNOSIS — D84.9 IMMUNOSUPPRESSION: ICD-10-CM

## 2023-08-16 DIAGNOSIS — L40.9 PSORIASIS: ICD-10-CM

## 2023-08-16 DIAGNOSIS — L40.50 PSORIATIC ARTHRITIS: Primary | ICD-10-CM

## 2023-08-16 DIAGNOSIS — L40.9 SCALP PSORIASIS: ICD-10-CM

## 2023-08-16 LAB
ALBUMIN SERPL BCP-MCNC: 3.9 G/DL (ref 3.5–5.2)
ALP SERPL-CCNC: 98 U/L (ref 55–135)
ALT SERPL W/O P-5'-P-CCNC: 40 U/L (ref 10–44)
ANION GAP SERPL CALC-SCNC: 12 MMOL/L (ref 8–16)
AST SERPL-CCNC: 27 U/L (ref 10–40)
BASOPHILS # BLD AUTO: 0.04 K/UL (ref 0–0.2)
BASOPHILS NFR BLD: 0.5 % (ref 0–1.9)
BILIRUB SERPL-MCNC: 0.5 MG/DL (ref 0.1–1)
BUN SERPL-MCNC: 16 MG/DL (ref 8–23)
CALCIUM SERPL-MCNC: 9.3 MG/DL (ref 8.7–10.5)
CHLORIDE SERPL-SCNC: 103 MMOL/L (ref 95–110)
CO2 SERPL-SCNC: 27 MMOL/L (ref 23–29)
CREAT SERPL-MCNC: 0.8 MG/DL (ref 0.5–1.4)
CRP SERPL-MCNC: 7.3 MG/L (ref 0–8.2)
DIFFERENTIAL METHOD: NORMAL
EOSINOPHIL # BLD AUTO: 0.1 K/UL (ref 0–0.5)
EOSINOPHIL NFR BLD: 1.7 % (ref 0–8)
ERYTHROCYTE [DISTWIDTH] IN BLOOD BY AUTOMATED COUNT: 13.9 % (ref 11.5–14.5)
ERYTHROCYTE [SEDIMENTATION RATE] IN BLOOD BY PHOTOMETRIC METHOD: 40 MM/HR (ref 0–36)
EST. GFR  (NO RACE VARIABLE): >60 ML/MIN/1.73 M^2
GLUCOSE SERPL-MCNC: 75 MG/DL (ref 70–110)
HCT VFR BLD AUTO: 41.6 % (ref 37–48.5)
HGB BLD-MCNC: 13.3 G/DL (ref 12–16)
IMM GRANULOCYTES # BLD AUTO: 0.02 K/UL (ref 0–0.04)
IMM GRANULOCYTES NFR BLD AUTO: 0.3 % (ref 0–0.5)
LYMPHOCYTES # BLD AUTO: 1.8 K/UL (ref 1–4.8)
LYMPHOCYTES NFR BLD: 23 % (ref 18–48)
MCH RBC QN AUTO: 29.2 PG (ref 27–31)
MCHC RBC AUTO-ENTMCNC: 32 G/DL (ref 32–36)
MCV RBC AUTO: 91 FL (ref 82–98)
MONOCYTES # BLD AUTO: 0.8 K/UL (ref 0.3–1)
MONOCYTES NFR BLD: 10.4 % (ref 4–15)
NEUTROPHILS # BLD AUTO: 4.9 K/UL (ref 1.8–7.7)
NEUTROPHILS NFR BLD: 64.1 % (ref 38–73)
NRBC BLD-RTO: 0 /100 WBC
PLATELET # BLD AUTO: 223 K/UL (ref 150–450)
PMV BLD AUTO: 11.2 FL (ref 9.2–12.9)
POTASSIUM SERPL-SCNC: 4.2 MMOL/L (ref 3.5–5.1)
PROT SERPL-MCNC: 7 G/DL (ref 6–8.4)
RBC # BLD AUTO: 4.56 M/UL (ref 4–5.4)
SODIUM SERPL-SCNC: 142 MMOL/L (ref 136–145)
WBC # BLD AUTO: 7.61 K/UL (ref 3.9–12.7)

## 2023-08-16 PROCEDURE — 85652 RBC SED RATE AUTOMATED: CPT | Performed by: INTERNAL MEDICINE

## 2023-08-16 PROCEDURE — 80053 COMPREHEN METABOLIC PANEL: CPT | Performed by: INTERNAL MEDICINE

## 2023-08-16 PROCEDURE — 86140 C-REACTIVE PROTEIN: CPT | Performed by: INTERNAL MEDICINE

## 2023-08-16 PROCEDURE — 36415 COLL VENOUS BLD VENIPUNCTURE: CPT | Mod: PO | Performed by: INTERNAL MEDICINE

## 2023-08-16 PROCEDURE — 99999 PR PBB SHADOW E&M-EST. PATIENT-LVL IV: CPT | Mod: PBBFAC,,, | Performed by: INTERNAL MEDICINE

## 2023-08-16 PROCEDURE — 99999 PR PBB SHADOW E&M-EST. PATIENT-LVL IV: ICD-10-PCS | Mod: PBBFAC,,, | Performed by: INTERNAL MEDICINE

## 2023-08-16 PROCEDURE — 99215 PR OFFICE/OUTPT VISIT, EST, LEVL V, 40-54 MIN: ICD-10-PCS | Mod: S$PBB,,, | Performed by: INTERNAL MEDICINE

## 2023-08-16 PROCEDURE — 85025 COMPLETE CBC W/AUTO DIFF WBC: CPT | Performed by: INTERNAL MEDICINE

## 2023-08-16 PROCEDURE — 99214 OFFICE O/P EST MOD 30 MIN: CPT | Mod: PBBFAC,PN | Performed by: INTERNAL MEDICINE

## 2023-08-16 PROCEDURE — 99215 OFFICE O/P EST HI 40 MIN: CPT | Mod: S$PBB,,, | Performed by: INTERNAL MEDICINE

## 2023-08-16 RX ORDER — METHOTREXATE 2.5 MG/1
TABLET ORAL
Qty: 48 TABLET | Refills: 3 | Status: SHIPPED | OUTPATIENT
Start: 2023-08-16 | End: 2023-12-20 | Stop reason: SDUPTHER

## 2023-08-16 RX ORDER — FOLIC ACID 1 MG/1
1 TABLET ORAL DAILY
Qty: 100 TABLET | Refills: 3 | Status: SHIPPED | OUTPATIENT
Start: 2023-08-16 | End: 2023-12-20 | Stop reason: SDUPTHER

## 2023-08-16 ASSESSMENT — ROUTINE ASSESSMENT OF PATIENT INDEX DATA (RAPID3)
PAIN SCORE: 3
FATIGUE SCORE: 1.1
PATIENT GLOBAL ASSESSMENT SCORE: 2
PSYCHOLOGICAL DISTRESS SCORE: 0
MDHAQ FUNCTION SCORE: 0.6
TOTAL RAPID3 SCORE: 2.33

## 2023-08-16 NOTE — PROGRESS NOTES
Subjective:          Chief Complaint: Charla Patrick is a 63 y.o. female who had concerns including Disease Management.    HPI:  Patient is a 63-year-old female previously seen [place] for psoriatic arthritis moved from [place] patient was seen in the [facility] in [place] her last office visit being 01/12/2021. Diagnosed approx 2006 - acute psoriasis.   Joints began shortly after the skin, joint pain was located in the fingers, hips, and both feet (chronic plantar fasciitis) .      Aggravating factors with the morning evening and during activity.    Alleviating factors were exercise and medications associated factors joint stiffness, fatigue, lack of sleep, joint pain, joint swelling, abdominal pain and night sweats.      She also notes dry eyes and dry mouth psoriasis family history of other autoimmune conditions.    Patient denies ulcerative colitis, crohns or other colitis. Only IBS to date.   Previous issues with continued GI complaints and c/o depression more difficult to manage with Otezla so discontinued.   Small recurrent scalp lesion is most prevalent historically.   Started Cosentyx (8/2021-8/2022)- despite titration to 300mg q month still with progressive arthritis.   Stelara started 9/2022- present.     Left elbow with injury still hurting.   Right lateral hip waking with pain. Typically it will stop or be brief now seems present every morning for over 30min.   Rates all this pain 2/10 so when comparing to previous visit. Seems to be doing well with Stelara.   Morning stiffness: 15-20min but ++gelling phenomena.   Multi-Dimensional Health Assessment 8/25/2022 12/19/2022 4/19/2023   MHAQ Score 0.7 0.4 0.4   Psychologic Score 1.1 2.2 1.1   Pain Score 6 4 2   Fatigue Score 2.2 1.1 0   Global Health Score 6 3 1   RAPID3 Score 4.78 2.78 1.44     8/2023:  2.33     Current medication:  Stelara 45mg Q 12 w (start 9/2022)   MTX 4 tabs weekly  Folic acid 1 mg daily  tizandine 4mg  tablets.   Tylenol PRN    Previous medications trialed:    Methotrexate began 2007.  Discontinued methotrexate 05/2017 no ASE or LAE.   Enbrel without adverse side effects no mention exact type.  Humira was loss of back efficacy over time,  Cimzia no benefit.   Otezla 30 mg once daily ([id number])- losing efficacy  Celebrex but developed a  gastritis despite this.  Cosentyx 300mg monthly (DC 9/2022) loss of efficacy  No personal hx of Cancer  No DVT/seizure or stroke, startin estradiol     Patient also has osteopenia her last DEXA 2018 was osteopenia but her FRAX score was below recommended bisphosphonate or other therapeutic ranges so she was continued on regular weight-bearing calcium and vitamin-D and repeat DXA is every 2 years    [facility] 7 10/23/2019 ultrasound bilateral hands  Left hand visualized portions of the bone muscle tendon joints and median nerve are normal appearing particular evaluation at the MCP 2 3 and 5 transverse views also obtained no evidence of any erosions power Doppler use for synovitis and tenosynovitis which was also negative.  I have a TB from 03/23/2020 as a QuantiFERON gold that is negative      REVIEW OF SYSTEMS:    Review of Systems   Constitutional:  Negative for fever, malaise/fatigue and weight loss.   HENT:  Negative for sore throat.    Eyes:  Negative for double vision, photophobia and redness.   Respiratory:  Negative for cough, shortness of breath and wheezing.    Cardiovascular:  Negative for chest pain, palpitations and orthopnea.   Gastrointestinal:  Negative for abdominal pain, constipation and diarrhea.   Genitourinary:  Negative for dysuria, hematuria and urgency.   Musculoskeletal:  Positive for joint pain. Negative for back pain and myalgias.   Skin:  Negative for rash.   Neurological:  Negative for dizziness, tingling, focal weakness and headaches.   Endo/Heme/Allergies:  Does not bruise/bleed easily.   Psychiatric/Behavioral:  Negative for depression,  hallucinations and suicidal ideas.                Objective:            Past Medical History:   Diagnosis Date    Allergic rhinitis     Diabetes mellitus, type 2     History of colon polyps     Hyperlipidemia     Psoriatic arthritis      Family History   Problem Relation Age of Onset    Heart disease Mother     Diabetes Mother     Brain cancer Father         Glioblastoma     Psoriasis Father     Thyroid disease Sister         hypothyroidism     Diabetes Sister     Psoriasis Sister     Cerebral palsy Daughter     Heart disease Daughter     Diabetes Daughter     No Known Problems Son     Stroke Maternal Grandmother     Diabetes Maternal Grandmother     Glaucoma Maternal Grandmother     Diabetes Maternal Grandfather     Ovarian cancer Paternal Grandmother     Psoriasis Paternal Grandmother     Pancreatic cancer Paternal Grandfather     No Known Problems Sister     Psoriasis Brother     Macular degeneration Neg Hx     Eczema Neg Hx     Lupus Neg Hx     Melanoma Neg Hx      Social History     Tobacco Use    Smoking status: Former     Current packs/day: 0.00     Average packs/day: 1 pack/day for 40.0 years (40.0 ttl pk-yrs)     Types: Cigarettes     Start date: 1979     Quit date: 2019     Years since quittin.5    Smokeless tobacco: Never    Tobacco comments:     former smoker   Substance Use Topics    Alcohol use: Never    Drug use: Never         Current Outpatient Medications on File Prior to Visit   Medication Sig Dispense Refill    ascorbic acid, vitamin C, (VITAMIN C) 500 MG tablet Take 500 mg by mouth once daily.      aspirin 81 MG Chew Take 81 mg by mouth once daily.      blood sugar diagnostic Strp To check BG 2 times daily, to use with insurance preferred meter 200 strip 3    cholecalciferol, vitamin D3, (VITAMIN D3) 50 mcg (2,000 unit) Cap capsule Take 2,000 Units by mouth.      clobetasol 0.05% (TEMOVATE) 0.05 % Oint Clobetasol propionate 0.05% once daily at night for 4 weeks, then alternate  nights for 4 weeks, and then twice weekly for 4 weeks 45 g 2    dulaglutide (TRULICITY) 1.5 mg/0.5 mL pen injector Inject 1.5 mg into the skin every 7 days. 12 pen 3    famotidine (PEPCID) 40 MG tablet Take 1 tablet (40 mg total) by mouth once daily. 90 tablet 3    fluocinonide 0.05% (LIDEX) 0.05 % cream Apply topically 2 (two) times daily. 120 g 3    folic acid (FOLVITE) 1 MG tablet Take 1 tablet (1 mg total) by mouth once daily. 100 tablet 3    loratadine (CLARITIN) 10 mg tablet TAKE 1 TABLET DAILY 90 tablet 3    methotrexate 2.5 MG Tab TAKE 4 TABLETS EVERY 7 DAYS 48 tablet 3    pantoprazole (PROTONIX) 40 MG tablet TAKE 1 TABLET DAILY 90 tablet 1    rosuvastatin (CRESTOR) 5 MG tablet Take 1 tablet (5 mg total) by mouth every evening. 90 tablet 3    sulfacetamide sodium-sulfur 10-5 % (w/w) Clsr Use to wash face daily 340 g 11    tiZANidine (ZANAFLEX) 4 MG tablet Take 1 tablet (4 mg total) by mouth nightly as needed (spasm of muscle). 90 tablet 3    turmeric root extract 500 mg Cap Take 500 mg by mouth once daily at 6am.      ustekinumab (STELARA) 45 mg/0.5 mL Syrg syringe Inject 45mg SQ on week, 0, 4 and then every 12 weeks. 1 each 5    vitamin B complex (SUPER B COMPLEX-B-12 ORAL)       estradioL (ESTRACE) 0.01 % (0.1 mg/gram) vaginal cream Place 2 g vaginally every evening for 14 days, THEN 1 g every evening for 14 days, THEN 1 g twice a week. Continue 1g twice weekly for maintenance.. 2 each 1     No current facility-administered medications on file prior to visit.       Vitals:    08/16/23 0915   BP: 120/75   Pulse: 69       Physical Exam:    Physical Exam  Constitutional:       Appearance: She is well-developed.   Eyes:      General: Lids are normal.   Skin:     Comments: Flare psoriasis EAC bilaterally with prominent scale and erythema.    Neurological:      Mental Status: She is oriented to person, place, and time.   Psychiatric:         Behavior: Behavior normal.         Thought Content: Thought content  normal.     There is currently no information documented on the homunculus. Go to the Rheumatology activity and complete the homunculus joint exam.          Rapid 3: 4.78. (8/2022)           Assessment:       Encounter Diagnoses   Name Primary?    Psoriatic arthritis Yes    Scalp psoriasis     Psoriasis               Plan:        Psoriatic arthritis  -     methotrexate 2.5 MG Tab; TAKE 4 TABLETS EVERY 7 DAYS  Dispense: 48 tablet; Refill: 3  -     folic acid (FOLVITE) 1 MG tablet; Take 1 tablet (1 mg total) by mouth once daily.  Dispense: 100 tablet; Refill: 3    Scalp psoriasis    Psoriasis  -     methotrexate 2.5 MG Tab; TAKE 4 TABLETS EVERY 7 DAYS  Dispense: 48 tablet; Refill: 3  -     folic acid (FOLVITE) 1 MG tablet; Take 1 tablet (1 mg total) by mouth once daily.  Dispense: 100 tablet; Refill: 3         Patient with long standing PsA:    -Started Stelara 9/2022 [facility]   [facility] no [facility] needed per [facility] 9/2022 [facility] which I did on 9/7/2022 with 5 refills. Patient is due 1/4/2023.     Enbrel without adverse side effects no mention exact type.  Humira was loss of back efficacy over time,  Cimzia no benefit.   Otezla 30 mg once daily ([id number])- losing efficacy  Celebrex but developed a  gastritis despite this.  Cosentyx 300mg monthly still with rising Rapid 3 and skin changes.       Continue Stelara  Continue Methotrexate. She is to take 4 tablets ONE TIME WEEKLY. Folic acid is one tablet daily  Labs in 3 months.        Follow up in about 4 months (around 12/16/2023).        40min consultation with greater than 50% of that time included Preparing to see the patient (review records, tests), Obtaining and/or reviewing separately obtained historical data, Performing a medically appropriate examination and/or evaluation , Ordering medications, tests, and/or procedures, Referring and communicating with other healthcare professionals , Documenting clinical information  in the electronic or other health record and Independently interpreting results  (as warranted) & communicating results to the patient/family/caregiver. All questions answered.

## 2023-08-22 ENCOUNTER — PATIENT MESSAGE (OUTPATIENT)
Dept: FAMILY MEDICINE | Facility: CLINIC | Age: 63
End: 2023-08-22
Payer: OTHER GOVERNMENT

## 2023-08-29 ENCOUNTER — PATIENT MESSAGE (OUTPATIENT)
Dept: FAMILY MEDICINE | Facility: CLINIC | Age: 63
End: 2023-08-29
Payer: OTHER GOVERNMENT

## 2023-08-29 ENCOUNTER — NURSE TRIAGE (OUTPATIENT)
Dept: ADMINISTRATIVE | Facility: CLINIC | Age: 63
End: 2023-08-29
Payer: OTHER GOVERNMENT

## 2023-08-29 NOTE — TELEPHONE ENCOUNTER
"Pt calls stating that she was just bitten by a dog in her left thigh. She states that she is not actively bleeding and that there is bruising and "scraping." Pt is unsure if dog is UTD on its vaccinations but states that she is trying to find out. Pt also notes a potential blood blister at the site of the bite and states that she washed the bite with a "wound rinse."     Care Advice recommends that pt be seen in ED now. Pt verbalizes understanding and is instructed to call back with any new/worsening sxs, questions, or concerns. She states that she may go to [facility] instead of ED. NT reiterates care advice.   Reason for Disposition   Any break in skin from BITE (e.g., cut, puncture, or scratch) and PET animal (e.g., dog, cat, or ferret) at risk for RABIES (e.g., sick, stray, unprovoked bite, developing country)    Additional Information   Negative: Major bleeding (actively dripping or spurting) that can't be stopped   Negative: Sounds like a life-threatening emergency to the triager   Negative: Any break in skin from BITE (e.g., cut, puncture, or scratch) and WILD animal at risk for RABIES (e.g., bat, raccoon, sloan, skunk, coyote, other carnivores; see Background for list)    Protocols used: Animal Bite-A-OH    "

## 2023-08-29 NOTE — TELEPHONE ENCOUNTER
Called patient in regards to animal bite, but received no answer. Voicemail was left with a good call back number. Also, [facility] message was left.

## 2023-09-01 ENCOUNTER — LAB VISIT (OUTPATIENT)
Dept: LAB | Facility: HOSPITAL | Age: 63
End: 2023-09-01
Attending: INTERNAL MEDICINE
Payer: OTHER GOVERNMENT

## 2023-09-01 DIAGNOSIS — E78.49 OTHER HYPERLIPIDEMIA: ICD-10-CM

## 2023-09-01 LAB
ALBUMIN SERPL BCP-MCNC: 4 G/DL (ref 3.5–5.2)
ALP SERPL-CCNC: 108 U/L (ref 55–135)
ALT SERPL W/O P-5'-P-CCNC: 41 U/L (ref 10–44)
ANION GAP SERPL CALC-SCNC: 10 MMOL/L (ref 8–16)
AST SERPL-CCNC: 30 U/L (ref 10–40)
BILIRUB SERPL-MCNC: 0.4 MG/DL (ref 0.1–1)
BUN SERPL-MCNC: 15 MG/DL (ref 8–23)
CALCIUM SERPL-MCNC: 9.8 MG/DL (ref 8.7–10.5)
CHLORIDE SERPL-SCNC: 106 MMOL/L (ref 95–110)
CHOLEST SERPL-MCNC: 123 MG/DL (ref 120–199)
CHOLEST/HDLC SERPL: 2.7 {RATIO} (ref 2–5)
CO2 SERPL-SCNC: 24 MMOL/L (ref 23–29)
CREAT SERPL-MCNC: 1 MG/DL (ref 0.5–1.4)
EST. GFR  (NO RACE VARIABLE): >60 ML/MIN/1.73 M^2
GLUCOSE SERPL-MCNC: 116 MG/DL (ref 70–110)
HDLC SERPL-MCNC: 45 MG/DL (ref 40–75)
HDLC SERPL: 36.6 % (ref 20–50)
LDLC SERPL CALC-MCNC: 47.4 MG/DL (ref 63–159)
NONHDLC SERPL-MCNC: 78 MG/DL
POTASSIUM SERPL-SCNC: 4.3 MMOL/L (ref 3.5–5.1)
PROT SERPL-MCNC: 7.1 G/DL (ref 6–8.4)
SODIUM SERPL-SCNC: 140 MMOL/L (ref 136–145)
TRIGL SERPL-MCNC: 153 MG/DL (ref 30–150)

## 2023-09-01 PROCEDURE — 80053 COMPREHEN METABOLIC PANEL: CPT | Performed by: INTERNAL MEDICINE

## 2023-09-01 PROCEDURE — 36415 COLL VENOUS BLD VENIPUNCTURE: CPT | Mod: PO | Performed by: INTERNAL MEDICINE

## 2023-09-01 PROCEDURE — 80061 LIPID PANEL: CPT | Performed by: INTERNAL MEDICINE

## 2023-09-28 ENCOUNTER — PATIENT MESSAGE (OUTPATIENT)
Dept: FAMILY MEDICINE | Facility: CLINIC | Age: 63
End: 2023-09-28
Payer: OTHER GOVERNMENT

## 2023-09-28 DIAGNOSIS — M79.641 PAIN OF RIGHT HAND: Primary | ICD-10-CM

## 2023-09-29 ENCOUNTER — HOSPITAL ENCOUNTER (OUTPATIENT)
Dept: RADIOLOGY | Facility: HOSPITAL | Age: 63
Discharge: HOME | End: 2023-09-29
Attending: INTERNAL MEDICINE
Payer: OTHER GOVERNMENT

## 2023-09-29 DIAGNOSIS — M79.641 PAIN OF RIGHT HAND: ICD-10-CM

## 2023-09-29 PROCEDURE — 73130 X-RAY EXAM OF HAND: CPT | Mod: 26,RT,, | Performed by: RADIOLOGY

## 2023-09-29 PROCEDURE — 73130 XR HAND COMPLETE 3 VIEW RIGHT: ICD-10-PCS | Mod: 26,RT,, | Performed by: RADIOLOGY

## 2023-09-29 PROCEDURE — 73130 X-RAY EXAM OF HAND: CPT | Mod: TC,FY,PO,RT

## 2023-10-01 ENCOUNTER — PATIENT MESSAGE (OUTPATIENT)
Dept: FAMILY MEDICINE | Facility: CLINIC | Age: 63
End: 2023-10-01
Payer: OTHER GOVERNMENT

## 2023-10-02 ENCOUNTER — PATIENT MESSAGE (OUTPATIENT)
Dept: FAMILY MEDICINE | Facility: CLINIC | Age: 63
End: 2023-10-02
Payer: OTHER GOVERNMENT

## 2023-10-10 ENCOUNTER — OFFICE VISIT (OUTPATIENT)
Dept: FAMILY MEDICINE | Facility: CLINIC | Age: 63
End: 2023-10-10
Payer: OTHER GOVERNMENT

## 2023-10-10 ENCOUNTER — HOSPITAL ENCOUNTER (OUTPATIENT)
Dept: RADIOLOGY | Facility: HOSPITAL | Age: 63
Discharge: HOME | End: 2023-10-10
Attending: PHYSICIAN ASSISTANT
Payer: OTHER GOVERNMENT

## 2023-10-10 VITALS
SYSTOLIC BLOOD PRESSURE: 114 MMHG | HEART RATE: 66 BPM | BODY MASS INDEX: 30.85 KG/M2 | WEIGHT: 185.19 LBS | DIASTOLIC BLOOD PRESSURE: 62 MMHG | HEIGHT: 65 IN | OXYGEN SATURATION: 98 %

## 2023-10-10 DIAGNOSIS — S62.339A CLOSED BOXER'S FRACTURE, INITIAL ENCOUNTER: Primary | ICD-10-CM

## 2023-10-10 DIAGNOSIS — M85.641 CYST OF BONE OF RIGHT HAND: ICD-10-CM

## 2023-10-10 DIAGNOSIS — E11.9 TYPE 2 DIABETES MELLITUS WITHOUT COMPLICATION, WITHOUT LONG-TERM CURRENT USE OF INSULIN: ICD-10-CM

## 2023-10-10 DIAGNOSIS — M79.641 RIGHT HAND PAIN: ICD-10-CM

## 2023-10-10 PROCEDURE — 99999PBSHW FLU VACCINE (QUAD) GREATER THAN OR EQUAL TO 3YO PRESERVATIVE FREE IM: Mod: PBBFAC,,,

## 2023-10-10 PROCEDURE — 99999PBSHW FLU VACCINE (QUAD) GREATER THAN OR EQUAL TO 3YO PRESERVATIVE FREE IM: ICD-10-PCS | Mod: PBBFAC,,,

## 2023-10-10 PROCEDURE — 73130 XR HAND COMPLETE 3 VIEW RIGHT: ICD-10-PCS | Mod: 26,RT,, | Performed by: RADIOLOGY

## 2023-10-10 PROCEDURE — 73130 X-RAY EXAM OF HAND: CPT | Mod: TC,FY,PO,RT

## 2023-10-10 PROCEDURE — 99215 OFFICE O/P EST HI 40 MIN: CPT | Mod: PBBFAC,25,PO | Performed by: PHYSICIAN ASSISTANT

## 2023-10-10 PROCEDURE — 99214 OFFICE O/P EST MOD 30 MIN: CPT | Mod: S$PBB,,, | Performed by: INTERNAL MEDICINE

## 2023-10-10 PROCEDURE — 99999 PR PBB SHADOW E&M-EST. PATIENT-LVL V: ICD-10-PCS | Mod: PBBFAC,,,

## 2023-10-10 PROCEDURE — 99999 PR PBB SHADOW E&M-EST. PATIENT-LVL V: CPT | Mod: PBBFAC,,,

## 2023-10-10 PROCEDURE — G0008 ADMIN INFLUENZA VIRUS VAC: HCPCS | Mod: PBBFAC,PO

## 2023-10-10 PROCEDURE — 73130 X-RAY EXAM OF HAND: CPT | Mod: 26,RT,, | Performed by: RADIOLOGY

## 2023-10-10 PROCEDURE — 99214 PR OFFICE/OUTPT VISIT, EST, LEVL IV, 30-39 MIN: ICD-10-PCS | Mod: S$PBB,,, | Performed by: INTERNAL MEDICINE

## 2023-10-10 NOTE — PROGRESS NOTES
Subjective     Patient ID: Charla Patrick is a 63 y.o. female.    Chief Complaint: Hand Injury    HPI    Pt is known to me, PCP Dr. Vela.     Pt is a 63 year old female with  HLD, aortic atherosclerosis, T2DM, obesity, GERD, colon polyps, psoriatic arthritis. She presents today complaining of right hand pain. She fell about 2 weeks ago and messaged Dr. Vela. Received x ray order, which showed potential right 5th metacarpal fx, age indeterminate. Since then, however, she has noticed a cyst form over the injury site. This is painful to touch.     Review of Systems   Constitutional:  Negative for chills and fever.   Musculoskeletal:         Pain over right 5th distal metacarpal with palpable mass          Objective     Physical Exam  Constitutional:       Appearance: Normal appearance.   Musculoskeletal:      Comments: Full ROM of hand, tenderness over distal right 5th metacarap with palpable mobile cyst. Cyst is tender to touch   Skin:     Findings: No bruising, erythema, lesion or rash.   Neurological:      Mental Status: She is alert.            Assessment and Plan     1. Closed boxer's fracture, initial encounter  -unsure if this is truly an acute injury, will repeat x ray  -  2. Cyst of bone of right hand  -schedule with hand surgery    3. Right hand pain  -     Ambulatory referral/consult to Hand Surgery; Future; Expected date: 10/17/2023  -     X-Ray Hand 3 View Right; Future; Expected date: 10/10/2023    4. Type 2 diabetes mellitus without complication, without long-term current use of insulin  -     Diabetic Eye Screening Photo; Future

## 2023-10-10 NOTE — PATIENT INSTRUCTIONS
A few reminders from today:    Flu shot today  Schedule diabetic eye exam  Repeat x ray today  Schedule with hand specialist  Wear splint at all times    Follow up with me if needed.   Please go to ER/urgent care if after hours or symptoms persist/worsen.     Do not hesitate to get in touch with me should you have any further questions.     Thank you for trusting me with your care!  I wish you health and happiness.    -Jeanna Rascon PA-C

## 2023-10-21 DIAGNOSIS — L40.50 PSORIATIC ARTHRITIS: ICD-10-CM

## 2023-10-21 DIAGNOSIS — L40.9 PSORIASIS: ICD-10-CM

## 2023-10-23 ENCOUNTER — PATIENT MESSAGE (OUTPATIENT)
Dept: FAMILY MEDICINE | Facility: CLINIC | Age: 63
End: 2023-10-23
Payer: OTHER GOVERNMENT

## 2023-10-25 RX ORDER — USTEKINUMAB 45 MG/.5ML
INJECTION, SOLUTION SUBCUTANEOUS
Qty: 0.5 ML | Refills: 3 | Status: SHIPPED | OUTPATIENT
Start: 2023-10-25

## 2023-11-28 ENCOUNTER — PATIENT MESSAGE (OUTPATIENT)
Dept: FAMILY MEDICINE | Facility: CLINIC | Age: 63
End: 2023-11-28
Payer: OTHER GOVERNMENT

## 2023-12-15 ENCOUNTER — LAB VISIT (OUTPATIENT)
Dept: LAB | Facility: HOSPITAL | Age: 63
End: 2023-12-15
Attending: INTERNAL MEDICINE
Payer: OTHER GOVERNMENT

## 2023-12-15 DIAGNOSIS — L40.50 PSORIATIC ARTHRITIS: ICD-10-CM

## 2023-12-15 DIAGNOSIS — D84.9 IMMUNOSUPPRESSION: ICD-10-CM

## 2023-12-15 DIAGNOSIS — Z79.899 HIGH RISK MEDICATIONS (NOT ANTICOAGULANTS) LONG-TERM USE: ICD-10-CM

## 2023-12-15 LAB
ALBUMIN SERPL BCP-MCNC: 3.8 G/DL (ref 3.5–5.2)
ALP SERPL-CCNC: 97 U/L (ref 55–135)
ALT SERPL W/O P-5'-P-CCNC: 36 U/L (ref 10–44)
ANION GAP SERPL CALC-SCNC: 9 MMOL/L (ref 8–16)
AST SERPL-CCNC: 24 U/L (ref 10–40)
BASOPHILS # BLD AUTO: 0.05 K/UL (ref 0–0.2)
BASOPHILS NFR BLD: 0.8 % (ref 0–1.9)
BILIRUB SERPL-MCNC: 0.5 MG/DL (ref 0.1–1)
BUN SERPL-MCNC: 17 MG/DL (ref 8–23)
CALCIUM SERPL-MCNC: 9.6 MG/DL (ref 8.7–10.5)
CHLORIDE SERPL-SCNC: 106 MMOL/L (ref 95–110)
CO2 SERPL-SCNC: 25 MMOL/L (ref 23–29)
CREAT SERPL-MCNC: 0.8 MG/DL (ref 0.5–1.4)
CRP SERPL-MCNC: 5.2 MG/L (ref 0–8.2)
DIFFERENTIAL METHOD: NORMAL
EOSINOPHIL # BLD AUTO: 0.2 K/UL (ref 0–0.5)
EOSINOPHIL NFR BLD: 2.4 % (ref 0–8)
ERYTHROCYTE [DISTWIDTH] IN BLOOD BY AUTOMATED COUNT: 14.2 % (ref 11.5–14.5)
ERYTHROCYTE [SEDIMENTATION RATE] IN BLOOD BY PHOTOMETRIC METHOD: 20 MM/HR (ref 0–36)
EST. GFR  (NO RACE VARIABLE): >60 ML/MIN/1.73 M^2
GLUCOSE SERPL-MCNC: 146 MG/DL (ref 70–110)
HCT VFR BLD AUTO: 40.1 % (ref 37–48.5)
HGB BLD-MCNC: 13 G/DL (ref 12–16)
IMM GRANULOCYTES # BLD AUTO: 0.02 K/UL (ref 0–0.04)
IMM GRANULOCYTES NFR BLD AUTO: 0.3 % (ref 0–0.5)
LYMPHOCYTES # BLD AUTO: 1.5 K/UL (ref 1–4.8)
LYMPHOCYTES NFR BLD: 23.8 % (ref 18–48)
MCH RBC QN AUTO: 29.6 PG (ref 27–31)
MCHC RBC AUTO-ENTMCNC: 32.4 G/DL (ref 32–36)
MCV RBC AUTO: 91 FL (ref 82–98)
MONOCYTES # BLD AUTO: 0.6 K/UL (ref 0.3–1)
MONOCYTES NFR BLD: 9.6 % (ref 4–15)
NEUTROPHILS # BLD AUTO: 3.9 K/UL (ref 1.8–7.7)
NEUTROPHILS NFR BLD: 63.1 % (ref 38–73)
NRBC BLD-RTO: 0 /100 WBC
PLATELET # BLD AUTO: 227 K/UL (ref 150–450)
PMV BLD AUTO: 10.9 FL (ref 9.2–12.9)
POTASSIUM SERPL-SCNC: 4.5 MMOL/L (ref 3.5–5.1)
PROT SERPL-MCNC: 6.7 G/DL (ref 6–8.4)
RBC # BLD AUTO: 4.39 M/UL (ref 4–5.4)
SODIUM SERPL-SCNC: 140 MMOL/L (ref 136–145)
WBC # BLD AUTO: 6.17 K/UL (ref 3.9–12.7)

## 2023-12-15 PROCEDURE — 85652 RBC SED RATE AUTOMATED: CPT | Performed by: INTERNAL MEDICINE

## 2023-12-15 PROCEDURE — 36415 COLL VENOUS BLD VENIPUNCTURE: CPT | Mod: PO | Performed by: INTERNAL MEDICINE

## 2023-12-15 PROCEDURE — 80053 COMPREHEN METABOLIC PANEL: CPT | Performed by: INTERNAL MEDICINE

## 2023-12-15 PROCEDURE — 85025 COMPLETE CBC W/AUTO DIFF WBC: CPT | Performed by: INTERNAL MEDICINE

## 2023-12-15 PROCEDURE — 86140 C-REACTIVE PROTEIN: CPT | Performed by: INTERNAL MEDICINE

## 2023-12-20 ENCOUNTER — OFFICE VISIT (OUTPATIENT)
Dept: RHEUMATOLOGY | Facility: CLINIC | Age: 63
End: 2023-12-20
Payer: OTHER GOVERNMENT

## 2023-12-20 VITALS
WEIGHT: 187.38 LBS | BODY MASS INDEX: 31.22 KG/M2 | HEIGHT: 65 IN | HEART RATE: 66 BPM | DIASTOLIC BLOOD PRESSURE: 77 MMHG | SYSTOLIC BLOOD PRESSURE: 115 MMHG

## 2023-12-20 DIAGNOSIS — D84.821 IMMUNOSUPPRESSION DUE TO DRUG THERAPY: ICD-10-CM

## 2023-12-20 DIAGNOSIS — L40.9 SCALP PSORIASIS: ICD-10-CM

## 2023-12-20 DIAGNOSIS — L40.50 PSORIATIC ARTHRITIS: Primary | ICD-10-CM

## 2023-12-20 DIAGNOSIS — Z79.899 HIGH RISK MEDICATIONS (NOT ANTICOAGULANTS) LONG-TERM USE: ICD-10-CM

## 2023-12-20 DIAGNOSIS — L40.9 PSORIASIS: ICD-10-CM

## 2023-12-20 DIAGNOSIS — Z79.899 IMMUNOSUPPRESSION DUE TO DRUG THERAPY: ICD-10-CM

## 2023-12-20 PROCEDURE — 99999 PR PBB SHADOW E&M-EST. PATIENT-LVL IV: ICD-10-PCS | Mod: PBBFAC,,, | Performed by: INTERNAL MEDICINE

## 2023-12-20 PROCEDURE — 99215 PR OFFICE/OUTPT VISIT, EST, LEVL V, 40-54 MIN: ICD-10-PCS | Mod: S$PBB,,, | Performed by: INTERNAL MEDICINE

## 2023-12-20 PROCEDURE — 99215 OFFICE O/P EST HI 40 MIN: CPT | Mod: S$PBB,,, | Performed by: INTERNAL MEDICINE

## 2023-12-20 PROCEDURE — 99999 PR PBB SHADOW E&M-EST. PATIENT-LVL IV: CPT | Mod: PBBFAC,,, | Performed by: INTERNAL MEDICINE

## 2023-12-20 PROCEDURE — 99214 OFFICE O/P EST MOD 30 MIN: CPT | Mod: PBBFAC,PN | Performed by: INTERNAL MEDICINE

## 2023-12-20 RX ORDER — TIZANIDINE 4 MG/1
4 TABLET ORAL NIGHTLY PRN
Qty: 90 TABLET | Refills: 3 | Status: SHIPPED | OUTPATIENT
Start: 2023-12-20 | End: 2024-12-19

## 2023-12-20 RX ORDER — FOLIC ACID 1 MG/1
1 TABLET ORAL DAILY
Qty: 100 TABLET | Refills: 3 | Status: SHIPPED | OUTPATIENT
Start: 2023-12-20

## 2023-12-20 RX ORDER — METHOTREXATE 2.5 MG/1
TABLET ORAL
Qty: 48 TABLET | Refills: 3 | Status: SHIPPED | OUTPATIENT
Start: 2023-12-20

## 2023-12-20 ASSESSMENT — ROUTINE ASSESSMENT OF PATIENT INDEX DATA (RAPID3)
MDHAQ FUNCTION SCORE: 0.7
PSYCHOLOGICAL DISTRESS SCORE: 0
PAIN SCORE: 2
FATIGUE SCORE: 2.2
PATIENT GLOBAL ASSESSMENT SCORE: 2
TOTAL RAPID3 SCORE: 2.11

## 2024-01-05 ENCOUNTER — LAB VISIT (OUTPATIENT)
Dept: LAB | Facility: HOSPITAL | Age: 64
End: 2024-01-05
Attending: INTERNAL MEDICINE
Payer: OTHER GOVERNMENT

## 2024-01-05 ENCOUNTER — OFFICE VISIT (OUTPATIENT)
Dept: FAMILY MEDICINE | Facility: CLINIC | Age: 64
End: 2024-01-05
Payer: OTHER GOVERNMENT

## 2024-01-05 VITALS
BODY MASS INDEX: 31.22 KG/M2 | HEIGHT: 65 IN | OXYGEN SATURATION: 96 % | DIASTOLIC BLOOD PRESSURE: 68 MMHG | BODY MASS INDEX: 31.51 KG/M2 | OXYGEN SATURATION: 97 % | WEIGHT: 187.38 LBS | SYSTOLIC BLOOD PRESSURE: 122 MMHG | HEART RATE: 66 BPM | DIASTOLIC BLOOD PRESSURE: 66 MMHG | HEIGHT: 65 IN | HEART RATE: 75 BPM | WEIGHT: 189.13 LBS | SYSTOLIC BLOOD PRESSURE: 120 MMHG

## 2024-01-05 DIAGNOSIS — H90.6 MIXED CONDUCTIVE AND SENSORINEURAL HEARING LOSS OF BOTH EARS: ICD-10-CM

## 2024-01-05 DIAGNOSIS — Z87.891 HISTORY OF NICOTINE DEPENDENCE: ICD-10-CM

## 2024-01-05 DIAGNOSIS — E11.9 TYPE 2 DIABETES MELLITUS WITHOUT COMPLICATION, WITHOUT LONG-TERM CURRENT USE OF INSULIN: Primary | ICD-10-CM

## 2024-01-05 DIAGNOSIS — E66.09 CLASS 1 OBESITY DUE TO EXCESS CALORIES WITH SERIOUS COMORBIDITY AND BODY MASS INDEX (BMI) OF 31.0 TO 31.9 IN ADULT: ICD-10-CM

## 2024-01-05 DIAGNOSIS — E11.9 TYPE 2 DIABETES MELLITUS WITHOUT COMPLICATION, WITHOUT LONG-TERM CURRENT USE OF INSULIN: ICD-10-CM

## 2024-01-05 DIAGNOSIS — E78.49 OTHER HYPERLIPIDEMIA: ICD-10-CM

## 2024-01-05 DIAGNOSIS — K21.9 GASTROESOPHAGEAL REFLUX DISEASE WITHOUT ESOPHAGITIS: ICD-10-CM

## 2024-01-05 PROBLEM — Z74.09 IMPAIRED MOBILITY AND ADLS: Status: RESOLVED | Noted: 2023-05-09 | Resolved: 2024-01-05

## 2024-01-05 PROBLEM — Z78.9 IMPAIRED MOBILITY AND ADLS: Status: RESOLVED | Noted: 2023-05-09 | Resolved: 2024-01-05

## 2024-01-05 LAB
ESTIMATED AVG GLUCOSE: 137 MG/DL (ref 68–131)
HBA1C MFR BLD: 6.4 % (ref 4–5.6)

## 2024-01-05 PROCEDURE — 99214 OFFICE O/P EST MOD 30 MIN: CPT | Mod: S$PBB,,, | Performed by: INTERNAL MEDICINE

## 2024-01-05 PROCEDURE — 99215 OFFICE O/P EST HI 40 MIN: CPT | Mod: PBBFAC,PO | Performed by: INTERNAL MEDICINE

## 2024-01-05 PROCEDURE — 36415 COLL VENOUS BLD VENIPUNCTURE: CPT | Mod: PO | Performed by: INTERNAL MEDICINE

## 2024-01-05 PROCEDURE — 83036 HEMOGLOBIN GLYCOSYLATED A1C: CPT | Performed by: INTERNAL MEDICINE

## 2024-01-05 PROCEDURE — 99999 PR PBB SHADOW E&M-EST. PATIENT-LVL V: CPT | Mod: PBBFAC,,, | Performed by: INTERNAL MEDICINE

## 2024-01-05 NOTE — PROGRESS NOTES
"[facility]  [facility]   [address] [facility]       Patient ID: Charla Patrick     Chief Complaint:   Chief Complaint   Patient presents with    Follow-up     6 month           HPI:  Routine office visit to review labs.  I did review the labs done by Dr. French and they look good except her sugar is a little high.  She is due for an A1c which we will get today.  She was taking Trulicity 1.5 mg per week but has noticed that she is gaining weight and would like to try Mounjaro instead so I have sent in a prescription for 7.5 mg of Mounjaro per week.  Vital signs do look very good.  She will be due for a low-dose CT of her chest in a few weeks so I have dropped that order.  She does need a diabetic eye exam and I have given her a referral to optometry.  The remainder of her health maintenance is up-to-date.    Review of Systems       Negative     Objective:      Physical Exam   Physical Exam       Obese     Vitals:   Vitals:    01/05/24 1438   BP: 122/66   Pulse: 75   SpO2: 97%   Weight: 85.8 kg (189 lb 2.5 oz)   Height: 5' 5" (1.651 m)        Assessment:           Plan:       Charla Patrick  was seen today for follow-up and may need lab work.    Diagnoses and all orders for this visit:    Charla was seen today for follow-up.    Diagnoses and all orders for this visit:    Type 2 diabetes mellitus without complication, without long-term current use of insulin  -     Hemoglobin A1C; Future  -     tirzepatide 7.5 mg/0.5 mL PnIj; Inject 7.5 mg into the skin every 7 days.  -     Ambulatory referral/consult to Optometry; Future  Labs today     History of nicotine dependence  -     CT Chest Lung Screening Low Dose; Future  Schedule CT     Mixed conductive and sensorineural hearing loss of both ears  Stable     Other hyperlipidemia  Controlled with med     Class 1 obesity due to excess calories with serious comorbidity and body mass index (BMI) of 31.0 to 31.9 in adult  Monitor     Gastroesophageal " reflux disease without esophagitis  Controlled            Deyvi Vela MD

## 2024-01-14 ENCOUNTER — PATIENT MESSAGE (OUTPATIENT)
Dept: FAMILY MEDICINE | Facility: CLINIC | Age: 64
End: 2024-01-14
Payer: OTHER GOVERNMENT

## 2024-01-22 ENCOUNTER — DOCUMENTATION ONLY (OUTPATIENT)
Dept: REHABILITATION | Facility: HOSPITAL | Age: 64
End: 2024-01-22
Payer: OTHER GOVERNMENT

## 2024-01-22 NOTE — PROGRESS NOTES
Occupational Therapy D/C Note  Date: 1/22/2024    Pt was referred by  Tristin Jordan PA-C and seen for initial evaluation on 5/9/2023  for  Injury of left elbow, initial encounter [S59.902A] Lateral epicondylitis of left elbow [M77.12] . Pt attended 5 OT visits for pain in L elbow, stiffness L wrist, weakness LUE and Impaired ADL (therapy Dx). See last OT Tx note dated 6/06/2023, for last objective measures as well as goal achievement.  Pt has not attended OT since. Current status is unknown.   Pt to be discharged from OT at this time.    VIC Heard/[place]

## 2024-01-23 ENCOUNTER — PATIENT MESSAGE (OUTPATIENT)
Dept: FAMILY MEDICINE | Facility: CLINIC | Age: 64
End: 2024-01-23
Payer: OTHER GOVERNMENT

## 2024-01-24 DIAGNOSIS — E11.9 TYPE 2 DIABETES MELLITUS WITHOUT COMPLICATION, WITHOUT LONG-TERM CURRENT USE OF INSULIN: Primary | ICD-10-CM

## 2024-01-26 ENCOUNTER — HOSPITAL ENCOUNTER (OUTPATIENT)
Dept: RADIOLOGY | Facility: HOSPITAL | Age: 64
Discharge: HOME | End: 2024-01-26
Attending: INTERNAL MEDICINE
Payer: OTHER GOVERNMENT

## 2024-01-26 DIAGNOSIS — Z87.891 HISTORY OF NICOTINE DEPENDENCE: ICD-10-CM

## 2024-01-26 PROCEDURE — 71271 CT THORAX LUNG CANCER SCR C-: CPT | Mod: 26,,, | Performed by: RADIOLOGY

## 2024-01-26 PROCEDURE — 71271 CT THORAX LUNG CANCER SCR C-: CPT | Mod: TC,PO

## 2024-01-31 ENCOUNTER — PATIENT MESSAGE (OUTPATIENT)
Dept: FAMILY MEDICINE | Facility: CLINIC | Age: 64
End: 2024-01-31
Payer: OTHER GOVERNMENT

## 2024-01-31 ENCOUNTER — TELEPHONE (OUTPATIENT)
Dept: FAMILY MEDICINE | Facility: CLINIC | Age: 64
End: 2024-01-31
Payer: OTHER GOVERNMENT

## 2024-01-31 DIAGNOSIS — K76.9 LIVER DISEASE, UNSPECIFIED: Primary | ICD-10-CM

## 2024-01-31 DIAGNOSIS — K76.9 LIVER LESION: ICD-10-CM

## 2024-01-31 NOTE — TELEPHONE ENCOUNTER
I spoke with my Radiology colleagues and he recommended getting an MRI of your liver to fully characterize this as a cyst or not.  Orders have been placed.

## 2024-01-31 NOTE — TELEPHONE ENCOUNTER
Spoke with patient in regards to scheduling MRI of the liver. Patient was scheduled for 2/20 at 2:00 pm.

## 2024-02-20 ENCOUNTER — HOSPITAL ENCOUNTER (OUTPATIENT)
Dept: RADIOLOGY | Facility: HOSPITAL | Age: 64
Discharge: HOME | End: 2024-02-20
Attending: INTERNAL MEDICINE
Payer: OTHER GOVERNMENT

## 2024-02-20 DIAGNOSIS — K76.9 LIVER DISEASE, UNSPECIFIED: ICD-10-CM

## 2024-02-20 DIAGNOSIS — K76.9 LIVER LESION: ICD-10-CM

## 2024-02-20 PROCEDURE — 74183 MRI ABD W/O CNTR FLWD CNTR: CPT | Mod: TC,PO

## 2024-02-20 PROCEDURE — 25500020 PHARM REV CODE 255: Mod: PO | Performed by: INTERNAL MEDICINE

## 2024-02-20 PROCEDURE — A9585 GADOBUTROL INJECTION: HCPCS | Mod: PO | Performed by: INTERNAL MEDICINE

## 2024-02-20 PROCEDURE — 74183 MRI ABD W/O CNTR FLWD CNTR: CPT | Mod: 26,,, | Performed by: RADIOLOGY

## 2024-02-20 RX ORDER — GADOBUTROL 604.72 MG/ML
8 INJECTION INTRAVENOUS
Status: COMPLETED | OUTPATIENT
Start: 2024-02-20 | End: 2024-02-20

## 2024-02-20 RX ADMIN — GADOBUTROL 8 ML: 604.72 INJECTION INTRAVENOUS at 02:02

## 2024-02-23 ENCOUNTER — PATIENT MESSAGE (OUTPATIENT)
Dept: FAMILY MEDICINE | Facility: CLINIC | Age: 64
End: 2024-02-23
Payer: OTHER GOVERNMENT

## 2024-02-27 ENCOUNTER — TELEPHONE (OUTPATIENT)
Dept: FAMILY MEDICINE | Facility: CLINIC | Age: 64
End: 2024-02-27
Payer: OTHER GOVERNMENT

## 2024-02-27 ENCOUNTER — TELEPHONE (OUTPATIENT)
Dept: HEMATOLOGY/ONCOLOGY | Facility: CLINIC | Age: 64
End: 2024-02-27
Payer: OTHER GOVERNMENT

## 2024-02-27 ENCOUNTER — PATIENT MESSAGE (OUTPATIENT)
Dept: FAMILY MEDICINE | Facility: CLINIC | Age: 64
End: 2024-02-27
Payer: OTHER GOVERNMENT

## 2024-02-27 DIAGNOSIS — R16.0 LIVER MASS: Primary | ICD-10-CM

## 2024-02-27 NOTE — TELEPHONE ENCOUNTER
LVM for pt to call back to schedule from referral.  Diagnostic clinic referral in [facility].   MRI: Liver mass    Pt returned call and scheduled on Diagnosis clinic for next Tuesday with Dr Partida.    Pt stated her insurance changes this Friday March 1st to [facility] medicare.     Pt confirmed the appt date time and location.

## 2024-02-27 NOTE — TELEPHONE ENCOUNTER
----- Message from Deyvi Vela MD sent at 2/26/2024  8:52 PM CST -----  MRI shows the mass in the liver, but it has not certain if this is a benign cyst or something more concerning like liver or biliary cancer.  We have a special clinic dedicated to these types of things that can help fast track with the next steps in the workup.  May I refer you there?

## 2024-02-27 NOTE — TELEPHONE ENCOUNTER
----- Message from Netta Mercer RN sent at 2/27/2024  1:04 PM CST -----  Regarding: Diagnosis clinic referral for Liver Mass  Teams Message:    [12:38 PM] Deyvi Dane    MRN: [id number]. I just placed a referral to the suspected cancer clinic for a liver mass.

## 2024-03-05 ENCOUNTER — OFFICE VISIT (OUTPATIENT)
Dept: HEMATOLOGY/ONCOLOGY | Facility: CLINIC | Age: 64
End: 2024-03-05
Payer: MEDICARE

## 2024-03-05 VITALS
SYSTOLIC BLOOD PRESSURE: 133 MMHG | OXYGEN SATURATION: 99 % | BODY MASS INDEX: 30.19 KG/M2 | DIASTOLIC BLOOD PRESSURE: 81 MMHG | WEIGHT: 181.19 LBS | HEIGHT: 65 IN | TEMPERATURE: 98 F | HEART RATE: 70 BPM

## 2024-03-05 DIAGNOSIS — E66.09 CLASS 1 OBESITY DUE TO EXCESS CALORIES WITH SERIOUS COMORBIDITY AND BODY MASS INDEX (BMI) OF 31.0 TO 31.9 IN ADULT: ICD-10-CM

## 2024-03-05 DIAGNOSIS — Z87.891 HISTORY OF NICOTINE DEPENDENCE: ICD-10-CM

## 2024-03-05 DIAGNOSIS — D84.821 IMMUNOSUPPRESSION DUE TO DRUG THERAPY: ICD-10-CM

## 2024-03-05 DIAGNOSIS — Z79.899 IMMUNOSUPPRESSION DUE TO DRUG THERAPY: ICD-10-CM

## 2024-03-05 DIAGNOSIS — R16.0 LIVER MASS: Primary | ICD-10-CM

## 2024-03-05 DIAGNOSIS — L40.50 PSORIATIC ARTHRITIS: ICD-10-CM

## 2024-03-05 PROCEDURE — 99205 OFFICE O/P NEW HI 60 MIN: CPT | Mod: S$GLB,,, | Performed by: INTERNAL MEDICINE

## 2024-03-05 PROCEDURE — 99999 PR PBB SHADOW E&M-EST. PATIENT-LVL V: CPT | Mod: PBBFAC,,, | Performed by: INTERNAL MEDICINE

## 2024-03-05 NOTE — PROGRESS NOTES
"DIAGNOSIS CLINIC NOTE    Name: Charla Patrick  MRN:  [id number]  :  1960 Age 63 y.o.  Date of Service: 3/5/2024    Reason for visit:  Charla Patrick is a 63 y.o. female here regarding liver mass.    ONCOLOGICAL HISTORY/HISTORY OF PRESENT ILLNESS:  Charla Patrick has medical problems including those listed below     Charla Patrick presents to diagnosis for evaluation of liver mass seen on MRI abd on 24 which was completed due to abnormality originally seen on low-dose CT for lung cancer screening.    She quit smoking 5 years ago.  Rarely drinks.  She reports 15 lb weight loss in the last 4 months, intentional, on Mounjaro.  Reports intermittent right upper quadrant pain, nonspecific.    Of note she has psoriatic arthritis and has been closely followed by Rheumatology and has been on a number of biologics and immune modulators.      Past Medical History:   Diagnosis Date    Allergic rhinitis     Diabetes mellitus, type 2     History of colon polyps     Hyperlipidemia     Psoriatic arthritis        Past Surgical History:   Procedure Laterality Date    APPENDECTOMY      BREAST LUMPECTOMY      BREAST SURGERY      left, "benign tumor"    COLONOSCOPY N/A 08/10/2021    Procedure: COLONOSCOPY  Banding of hemorrhoids possible;  Surgeon: Faizan Mejia MD;  Location: [facility];  Service: Endoscopy;  Laterality: N/A;    FOOT SURGERY Bilateral     plantar fasciitis and neuroma by Abimael    HYSTERECTOMY      OOPHORECTOMY         Allergies as of 2024 - Reviewed 2024   Allergen Reaction Noted    Court Other (See Comments) 2021    Codeine  2020    Zoloft [sertraline]  2020    Enbrel [etanercept] Rash 2020    Lamisil [terbinafine hcl] Rash 2020       Family History   Problem Relation Age of Onset    Heart disease Mother     Diabetes Mother     Brain cancer Father         Glioblastoma     Psoriasis Father     Thyroid disease Sister         hypothyroidism     Diabetes " "Sister     Psoriasis Sister     Cerebral palsy Daughter     Heart disease Daughter     Diabetes Daughter     No Known Problems Son     Stroke Maternal Grandmother     Diabetes Maternal Grandmother     Glaucoma Maternal Grandmother     Diabetes Maternal Grandfather     Ovarian cancer Paternal Grandmother     Psoriasis Paternal Grandmother     Pancreatic cancer Paternal Grandfather     No Known Problems Sister     Psoriasis Brother     Macular degeneration Neg Hx     Eczema Neg Hx     Lupus Neg Hx     Melanoma Neg Hx        Social History     Tobacco Use    Smoking status: Former     Current packs/day: 0.00     Average packs/day: 1 pack/day for 40.0 years (40.0 ttl pk-yrs)     Types: Cigarettes     Start date: 1979     Quit date: 2019     Years since quittin.0    Smokeless tobacco: Never    Tobacco comments:     former smoker   Substance Use Topics    Alcohol use: Never    Drug use: Never         PHYSICAL EXAMINATION:  /81 (BP Location: Right arm, Patient Position: Sitting, BP Method: Medium (Automatic))   Pulse 70   Temp 98 °F (36.7 °C) (Temporal)   Ht 5' 5" (1.651 m)   Wt 82.2 kg (181 lb 3.5 oz)   SpO2 99%   BMI 30.16 kg/m²   Wt Readings from Last 3 Encounters:   24 82.2 kg (181 lb 3.5 oz)   24 85.8 kg (189 lb 2.5 oz)   23 85 kg (187 lb 6.3 oz)     ECOG PERFORMANCE STATUS: 1  General:  Well-appearing, nontoxic  Eyes:  Equal and round pupils, EOMI, no scleral icterus  Mouth:  No lesions, moist  Cardiovascular:  Warm, well-perfused, no peripheral edema  Lungs:  Unlabored on room air, no wheezing  Neurologic:  Awake, alert and oriented, participating in the exam  Psych:  Appropriate mood and affect  Skin:  Normal pallor, No rashes  Heme:  No petechiae, no purpura      LABORATORY:  CBC  Lab Results   Component Value Date    WBC 6.17 12/15/2023    HGB 13.0 12/15/2023    HCT 40.1 12/15/2023    MCV 91 12/15/2023     12/15/2023         BMP  Lab Results   Component Value " Date     12/15/2023    K 4.5 12/15/2023     12/15/2023    CO2 25 12/15/2023    BUN 17 12/15/2023    CREATININE 1.0 02/20/2024    CALCIUM 9.6 12/15/2023    ANIONGAP 9 12/15/2023    ESTGFRAFRICA >60.0 04/21/2022    EGFRNONAA >60.0 04/21/2022           PERTINENT PATHOLOGY:        PERTINENT RADIOLOGY:  MRI Liver 2/20/24  Impression:     1. 43 x 47 x 52 mm septated cystic mass within the left hepatic lobe which apparently head increased in size when compared with the patient's prior CT examination performed 01/26/2023.  While this certainly could represent an enlarging hepatic cyst or hydatid cyst, biliary cystadenoma and biliary cystadenocarcinoma cannot be excluded.  No internal calcifications were noted at the time of the patient's recent CT scan.  2. Cholelithiasis  3. Fusiform dilatation of the extrahepatic common bile duct up to 10 mm with tapering of the distal most common bile duct in the vicinity of the sphincter of Oddi.  No obvious pancreatic head mass or choledocholithiasis.  No dilatation of the pancreatic duct.  If there is a clinical concern of biliary obstruction at the level of the sphincter of Oddi, consideration should be given to performing ERCP.  4. Hepatomegaly and hepatic steatosis with geographic focal fatty sparing adjacent the gallbladder fossa.  5. 4 mm subcapsular simple left hepatic lobe cyst  6. Fat containing umbilical hernia  7. 13 mm focus of probable transient hepatic enhancement difference/flow related phenomenon within segment VI of the right hepatic lobe, visible only on the early arterial phase contrast enhanced images.  This report was flagged in Epic as abnormal.        ASSESSMENT AND PLAN:    Charla Patrick is a 63 y.o. female with...    #Liver Mass   -given concern for possible adenocarcinoma we should proceed with biopsy, referral placed and contacted     # history of tobacco use-completing low-dose Cts  # psoriatic arthritis-follows closely with  Rheumatology, on a biologic, reports modest control  # weight loss-intentional with Mounjaro, 15 lb in the last 4 months        Please note, presumed malignancy symptom management prior to tissue diagnosis remains the responsibility of the referring physician, primary care, or emergency department.  Symptom management will transfer to the treating oncologist after tissue diagnosis and initial visit with the treating oncologist.       Route Chart for Scheduling    Med Onc Chart Routing      Follow up with physician 1 month.   Follow up with MURIEL    Infusion scheduling note    Injection scheduling note    Labs   Scheduling:  Preferred lab:  Lab interval:  No labs   Imaging    Pharmacy appointment    Other referrals         Referral to IR, rajan Partida M.D.  Hematology/Oncology

## 2024-03-07 PROBLEM — Z79.899 IMMUNOSUPPRESSION DUE TO DRUG THERAPY: Status: ACTIVE | Noted: 2024-03-07

## 2024-03-07 PROBLEM — D84.821 IMMUNOSUPPRESSION DUE TO DRUG THERAPY: Status: ACTIVE | Noted: 2024-03-07

## 2024-03-12 ENCOUNTER — LAB VISIT (OUTPATIENT)
Dept: LAB | Facility: HOSPITAL | Age: 64
End: 2024-03-12
Attending: INTERNAL MEDICINE
Payer: MEDICARE

## 2024-03-12 DIAGNOSIS — Z79.899 HIGH RISK MEDICATIONS (NOT ANTICOAGULANTS) LONG-TERM USE: ICD-10-CM

## 2024-03-12 DIAGNOSIS — L40.50 PSORIATIC ARTHRITIS: ICD-10-CM

## 2024-03-12 DIAGNOSIS — L40.9 SCALP PSORIASIS: ICD-10-CM

## 2024-03-12 DIAGNOSIS — D84.821 IMMUNOSUPPRESSION DUE TO DRUG THERAPY: ICD-10-CM

## 2024-03-12 DIAGNOSIS — Z79.899 IMMUNOSUPPRESSION DUE TO DRUG THERAPY: ICD-10-CM

## 2024-03-12 LAB
ALBUMIN SERPL BCP-MCNC: 3.9 G/DL (ref 3.5–5.2)
ALP SERPL-CCNC: 92 U/L (ref 55–135)
ALT SERPL W/O P-5'-P-CCNC: 36 U/L (ref 10–44)
ANION GAP SERPL CALC-SCNC: 6 MMOL/L (ref 8–16)
AST SERPL-CCNC: 26 U/L (ref 10–40)
BASOPHILS # BLD AUTO: 0.04 K/UL (ref 0–0.2)
BASOPHILS NFR BLD: 0.7 % (ref 0–1.9)
BILIRUB SERPL-MCNC: 0.5 MG/DL (ref 0.1–1)
BUN SERPL-MCNC: 13 MG/DL (ref 8–23)
CALCIUM SERPL-MCNC: 9.9 MG/DL (ref 8.7–10.5)
CHLORIDE SERPL-SCNC: 107 MMOL/L (ref 95–110)
CO2 SERPL-SCNC: 25 MMOL/L (ref 23–29)
CREAT SERPL-MCNC: 1.1 MG/DL (ref 0.5–1.4)
CRP SERPL-MCNC: 5.2 MG/L (ref 0–8.2)
DIFFERENTIAL METHOD BLD: NORMAL
EOSINOPHIL # BLD AUTO: 0.1 K/UL (ref 0–0.5)
EOSINOPHIL NFR BLD: 2.3 % (ref 0–8)
ERYTHROCYTE [DISTWIDTH] IN BLOOD BY AUTOMATED COUNT: 14 % (ref 11.5–14.5)
ERYTHROCYTE [SEDIMENTATION RATE] IN BLOOD BY PHOTOMETRIC METHOD: 6 MM/HR (ref 0–36)
EST. GFR  (NO RACE VARIABLE): 56.1 ML/MIN/1.73 M^2
GLUCOSE SERPL-MCNC: 120 MG/DL (ref 70–110)
HCT VFR BLD AUTO: 41.9 % (ref 37–48.5)
HGB BLD-MCNC: 13.4 G/DL (ref 12–16)
IMM GRANULOCYTES # BLD AUTO: 0.02 K/UL (ref 0–0.04)
IMM GRANULOCYTES NFR BLD AUTO: 0.4 % (ref 0–0.5)
LYMPHOCYTES # BLD AUTO: 1.5 K/UL (ref 1–4.8)
LYMPHOCYTES NFR BLD: 26.5 % (ref 18–48)
MCH RBC QN AUTO: 29.3 PG (ref 27–31)
MCHC RBC AUTO-ENTMCNC: 32 G/DL (ref 32–36)
MCV RBC AUTO: 92 FL (ref 82–98)
MONOCYTES # BLD AUTO: 0.5 K/UL (ref 0.3–1)
MONOCYTES NFR BLD: 8.8 % (ref 4–15)
NEUTROPHILS # BLD AUTO: 3.5 K/UL (ref 1.8–7.7)
NEUTROPHILS NFR BLD: 61.3 % (ref 38–73)
NRBC BLD-RTO: 0 /100 WBC
PLATELET # BLD AUTO: 219 K/UL (ref 150–450)
PMV BLD AUTO: 10.8 FL (ref 9.2–12.9)
POTASSIUM SERPL-SCNC: 4.4 MMOL/L (ref 3.5–5.1)
PROT SERPL-MCNC: 6.9 G/DL (ref 6–8.4)
RBC # BLD AUTO: 4.57 M/UL (ref 4–5.4)
SODIUM SERPL-SCNC: 138 MMOL/L (ref 136–145)
WBC # BLD AUTO: 5.66 K/UL (ref 3.9–12.7)

## 2024-03-12 PROCEDURE — 36415 COLL VENOUS BLD VENIPUNCTURE: CPT | Mod: PO | Performed by: INTERNAL MEDICINE

## 2024-03-12 PROCEDURE — 86140 C-REACTIVE PROTEIN: CPT | Performed by: INTERNAL MEDICINE

## 2024-03-12 PROCEDURE — 85652 RBC SED RATE AUTOMATED: CPT | Performed by: INTERNAL MEDICINE

## 2024-03-12 PROCEDURE — 80053 COMPREHEN METABOLIC PANEL: CPT | Performed by: INTERNAL MEDICINE

## 2024-03-12 PROCEDURE — 85025 COMPLETE CBC W/AUTO DIFF WBC: CPT | Performed by: INTERNAL MEDICINE

## 2024-03-15 DIAGNOSIS — K21.9 GASTROESOPHAGEAL REFLUX DISEASE WITHOUT ESOPHAGITIS: ICD-10-CM

## 2024-03-15 NOTE — TELEPHONE ENCOUNTER
No care due was identified.  [facility] Embedded Care Due Messages. Reference number: [id number].   3/15/2024 6:30:29 PM CDT

## 2024-03-16 RX ORDER — PANTOPRAZOLE SODIUM 40 MG/1
TABLET, DELAYED RELEASE ORAL
Qty: 90 TABLET | Refills: 3 | Status: SHIPPED | OUTPATIENT
Start: 2024-03-16

## 2024-03-16 NOTE — TELEPHONE ENCOUNTER
Refill Decision Note   Charla Celina  is requesting a refill authorization.  Brief Assessment and Rationale for Refill:  Approve     Medication Therapy Plan:        Comments:     Note composed:8:30 AM 03/16/2024

## 2024-03-20 ENCOUNTER — PATIENT MESSAGE (OUTPATIENT)
Dept: FAMILY MEDICINE | Facility: CLINIC | Age: 64
End: 2024-03-20
Payer: MEDICARE

## 2024-04-08 ENCOUNTER — PATIENT MESSAGE (OUTPATIENT)
Dept: FAMILY MEDICINE | Facility: CLINIC | Age: 64
End: 2024-04-08
Payer: MEDICARE

## 2024-04-08 DIAGNOSIS — E11.9 TYPE 2 DIABETES MELLITUS WITHOUT COMPLICATION, WITHOUT LONG-TERM CURRENT USE OF INSULIN: Primary | ICD-10-CM

## 2024-04-10 DIAGNOSIS — Z78.0 MENOPAUSE: ICD-10-CM

## 2024-04-10 RX ORDER — SEMAGLUTIDE 1.34 MG/ML
1 INJECTION, SOLUTION SUBCUTANEOUS
Qty: 1 EACH | Refills: 11 | Status: SHIPPED | OUTPATIENT
Start: 2024-04-10 | End: 2025-04-10

## 2024-04-15 ENCOUNTER — OFFICE VISIT (OUTPATIENT)
Dept: HEMATOLOGY/ONCOLOGY | Facility: CLINIC | Age: 64
End: 2024-04-15
Payer: MEDICARE

## 2024-04-15 VITALS
DIASTOLIC BLOOD PRESSURE: 76 MMHG | BODY MASS INDEX: 29.68 KG/M2 | HEART RATE: 66 BPM | TEMPERATURE: 97 F | WEIGHT: 178.38 LBS | OXYGEN SATURATION: 98 % | SYSTOLIC BLOOD PRESSURE: 118 MMHG

## 2024-04-15 DIAGNOSIS — R16.0 LIVER MASS: Primary | ICD-10-CM

## 2024-04-15 DIAGNOSIS — L40.50 PSORIATIC ARTHRITIS: ICD-10-CM

## 2024-04-15 DIAGNOSIS — K80.50 BILIARY COLIC: ICD-10-CM

## 2024-04-15 DIAGNOSIS — R16.0 HEPATOMEGALY: ICD-10-CM

## 2024-04-15 PROCEDURE — 3074F SYST BP LT 130 MM HG: CPT | Mod: CPTII,S$GLB,, | Performed by: INTERNAL MEDICINE

## 2024-04-15 PROCEDURE — 99214 OFFICE O/P EST MOD 30 MIN: CPT | Mod: S$GLB,,, | Performed by: INTERNAL MEDICINE

## 2024-04-15 PROCEDURE — 1101F PT FALLS ASSESS-DOCD LE1/YR: CPT | Mod: CPTII,S$GLB,, | Performed by: INTERNAL MEDICINE

## 2024-04-15 PROCEDURE — 1159F MED LIST DOCD IN RCRD: CPT | Mod: CPTII,S$GLB,, | Performed by: INTERNAL MEDICINE

## 2024-04-15 PROCEDURE — 3044F HG A1C LEVEL LT 7.0%: CPT | Mod: CPTII,S$GLB,, | Performed by: INTERNAL MEDICINE

## 2024-04-15 PROCEDURE — 3078F DIAST BP <80 MM HG: CPT | Mod: CPTII,S$GLB,, | Performed by: INTERNAL MEDICINE

## 2024-04-15 PROCEDURE — 3008F BODY MASS INDEX DOCD: CPT | Mod: CPTII,S$GLB,, | Performed by: INTERNAL MEDICINE

## 2024-04-15 PROCEDURE — 99999 PR PBB SHADOW E&M-EST. PATIENT-LVL IV: CPT | Mod: PBBFAC,,, | Performed by: INTERNAL MEDICINE

## 2024-04-15 PROCEDURE — 3288F FALL RISK ASSESSMENT DOCD: CPT | Mod: CPTII,S$GLB,, | Performed by: INTERNAL MEDICINE

## 2024-04-15 NOTE — PROGRESS NOTES
"DIAGNOSIS CLINIC NOTE    Name: Charla Patrick  MRN:  [id number]  :  1960 Age 64 y.o.  Date of Service: 4/15/2024    Reason for visit:  Charla Patrick is a 64 y.o. female here regarding liver mass.    ONCOLOGICAL HISTORY/HISTORY OF PRESENT ILLNESS:  Charla Patrick has medical problems including those listed below     Charla Patrick presents to diagnosis for evaluation of liver mass seen on MRI abd on 24 which was completed due to abnormality originally seen on low-dose CT for lung cancer screening.    She quit smoking 5 years ago.  Rarely drinks.  She reports 15 lb weight loss in the last 4 months, intentional, on Mounjaro.  Reports intermittent right upper quadrant pain, nonspecific.    Of note she has psoriatic arthritis and has been closely followed by Rheumatology and has been on a number of biologics and immune modulators.    Interval history-had IR biopsy/date drainage of hepatic cysts without any malignant or adventitious findings, reports biliary colic from time to time      Past Medical History:   Diagnosis Date    Allergic rhinitis     Diabetes mellitus, type 2     History of colon polyps     Hyperlipidemia     Psoriatic arthritis        Past Surgical History:   Procedure Laterality Date    APPENDECTOMY      BREAST LUMPECTOMY      BREAST SURGERY      left, "benign tumor"    COLONOSCOPY N/A 08/10/2021    Procedure: COLONOSCOPY  Banding of hemorrhoids possible;  Surgeon: Faizan Mejia MD;  Location: [facility];  Service: Endoscopy;  Laterality: N/A;    FOOT SURGERY Bilateral     plantar fasciitis and neuroma by Abimael    HYSTERECTOMY      OOPHORECTOMY         Allergies as of 04/15/2024 - Reviewed 04/15/2024   Allergen Reaction Noted    [facility] (See Comments) 2021    Codeine  2020    Zoloft [sertraline]  2020    Enbrel [etanercept] Rash 2020    Lamisil [terbinafine hcl] Rash 2020       Family History   Problem Relation Name Age of Onset    Heart disease " Mother      Diabetes Mother      Brain cancer Father          Glioblastoma     Psoriasis Father      Thyroid disease Sister          hypothyroidism     Diabetes Sister      Psoriasis Sister      Cerebral palsy Daughter      Heart disease Daughter      Diabetes Daughter      No Known Problems Son      Stroke Maternal Grandmother      Diabetes Maternal Grandmother      Glaucoma Maternal Grandmother      Diabetes Maternal Grandfather      Ovarian cancer Paternal Grandmother      Psoriasis Paternal Grandmother      Pancreatic cancer Paternal Grandfather      No Known Problems Sister      Psoriasis Brother      Macular degeneration Neg Hx      Eczema Neg Hx      Lupus Neg Hx      Melanoma Neg Hx         Social History     Tobacco Use    Smoking status: Former     Current packs/day: 0.00     Average packs/day: 1 pack/day for 40.0 years (40.0 ttl pk-yrs)     Types: Cigarettes     Start date: 1979     Quit date: 2019     Years since quittin.1    Smokeless tobacco: Never    Tobacco comments:     former smoker   Substance Use Topics    Alcohol use: Never    Drug use: Never         PHYSICAL EXAMINATION:  /76 (BP Location: Right arm, Patient Position: Sitting, BP Method: Medium (Automatic))   Pulse 66   Temp 96.7 °F (35.9 °C) (Temporal)   Wt 80.9 kg (178 lb 5.6 oz)   SpO2 98%   BMI 29.68 kg/m²   Wt Readings from Last 3 Encounters:   04/15/24 80.9 kg (178 lb 5.6 oz)   24 81.6 kg (180 lb)   24 82.2 kg (181 lb 3.5 oz)     ECOG PERFORMANCE STATUS: 1  General:  Well-appearing, nontoxic  Eyes:  Equal and round pupils, EOMI, no scleral icterus  Mouth:  No lesions, moist  Cardiovascular:  Warm, well-perfused, no peripheral edema  Abdomen:  Mild tenderness to deep palpation of the right upper quadrant  Lungs:  Unlabored on room air, no wheezing  Neurologic:  Awake, alert and oriented, participating in the exam  Psych:  Appropriate mood and affect  Skin:  Normal pallor, No rashes  Heme:  No petechiae,  "no purpura      LABORATORY:  CBC  Lab Results   Component Value Date    WBC 5.66 03/12/2024    HGB 13.4 03/12/2024    HCT 41.9 03/12/2024    MCV 92 03/12/2024     03/12/2024         BMP  Lab Results   Component Value Date     03/12/2024    K 4.4 03/12/2024     03/12/2024    CO2 25 03/12/2024    BUN 13 03/12/2024    CREATININE 1.1 03/12/2024    CALCIUM 9.9 03/12/2024    ANIONGAP 6 (L) 03/12/2024    ESTGFRAFRICA >60.0 04/21/2022    EGFRNONAA >60.0 04/21/2022           PERTINENT PATHOLOGY:  DIAGNOSIS:   03/19/2024 JWH/kl     LIVER, FURTHER DESIGNATED "HEPATIC CYST," FINE NEEDLE ASPIRATION:   --NEGATIVE FOR MALIGNANT CELLS.       PERTINENT RADIOLOGY:  MRI Liver 2/20/24  Impression:     1. 43 x 47 x 52 mm septated cystic mass within the left hepatic lobe which apparently head increased in size when compared with the patient's prior CT examination performed 01/26/2023.  While this certainly could represent an enlarging hepatic cyst or hydatid cyst, biliary cystadenoma and biliary cystadenocarcinoma cannot be excluded.  No internal calcifications were noted at the time of the patient's recent CT scan.  2. Cholelithiasis  3. Fusiform dilatation of the extrahepatic common bile duct up to 10 mm with tapering of the distal most common bile duct in the vicinity of the sphincter of Oddi.  No obvious pancreatic head mass or choledocholithiasis.  No dilatation of the pancreatic duct.  If there is a clinical concern of biliary obstruction at the level of the sphincter of Oddi, consideration should be given to performing ERCP.  4. Hepatomegaly and hepatic steatosis with geographic focal fatty sparing adjacent the gallbladder fossa.  5. 4 mm subcapsular simple left hepatic lobe cyst  6. Fat containing umbilical hernia  7. 13 mm focus of probable transient hepatic enhancement difference/flow related phenomenon within segment VI of the right hepatic lobe, visible only on the early arterial phase contrast enhanced " images.  This report was flagged in Epic as abnormal.        ASSESSMENT AND PLAN:    Charla Patrick is a 64 y.o. female with...    #Liver cyst  -there initially was concern for possible adenocarcinoma on the MRI liver completed in February 2024, subsequent IR biopsy showed no solid component in only cystic fluid-filled, this was drained and showed no malignancy or adventitious findings on cytology  -no additional workup is necessary for this    # hepatomegaly-compatible with hepatic steatosis, has nearly no alcohol intake, referral to hepatology  # biliary colic-advised her to continue to monitor this and if increased should pursue primary care versus surgical appointment for possible cholecystectomy    # history of tobacco use-completing low-dose Cts  # psoriatic arthritis-follows closely with Rheumatology, on a biologic, reports modest control  # weight loss-intentional with Mounjaro, 15 lb in the last 4 months, has recently joined a gym        Please note, presumed malignancy symptom management prior to tissue diagnosis remains the responsibility of the referring physician, primary care, or emergency department.  Symptom management will transfer to the treating oncologist after tissue diagnosis and initial visit with the treating oncologist.       Route Chart for Scheduling    Med Onc Chart Routing      Follow up with physician No follow up needed.   Follow up with MURIEL    Infusion scheduling note    Injection scheduling note    Labs    Imaging    Pharmacy appointment    Other referrals       Additional referrals needed  referral to hepatologist non urgent              Tala Partida M.D.  Hematology/Oncology

## 2024-04-18 ENCOUNTER — PATIENT MESSAGE (OUTPATIENT)
Dept: FAMILY MEDICINE | Facility: CLINIC | Age: 64
End: 2024-04-18
Payer: MEDICARE

## 2024-04-18 DIAGNOSIS — I25.10 CORONARY ARTERY CALCIFICATION SEEN ON CAT SCAN: Primary | ICD-10-CM

## 2024-04-19 ENCOUNTER — OFFICE VISIT (OUTPATIENT)
Dept: RHEUMATOLOGY | Facility: CLINIC | Age: 64
End: 2024-04-19
Payer: MEDICARE

## 2024-04-19 VITALS
WEIGHT: 181.44 LBS | DIASTOLIC BLOOD PRESSURE: 74 MMHG | HEIGHT: 65 IN | HEART RATE: 71 BPM | BODY MASS INDEX: 30.23 KG/M2 | SYSTOLIC BLOOD PRESSURE: 127 MMHG

## 2024-04-19 DIAGNOSIS — Z79.899 IMMUNOSUPPRESSION DUE TO DRUG THERAPY: ICD-10-CM

## 2024-04-19 DIAGNOSIS — D84.821 IMMUNOSUPPRESSION DUE TO DRUG THERAPY: ICD-10-CM

## 2024-04-19 DIAGNOSIS — L40.9 SCALP PSORIASIS: ICD-10-CM

## 2024-04-19 DIAGNOSIS — L40.50 PSORIATIC ARTHRITIS: Primary | ICD-10-CM

## 2024-04-19 PROCEDURE — 3078F DIAST BP <80 MM HG: CPT | Mod: CPTII,S$GLB,, | Performed by: INTERNAL MEDICINE

## 2024-04-19 PROCEDURE — 3074F SYST BP LT 130 MM HG: CPT | Mod: CPTII,S$GLB,, | Performed by: INTERNAL MEDICINE

## 2024-04-19 PROCEDURE — 3044F HG A1C LEVEL LT 7.0%: CPT | Mod: CPTII,S$GLB,, | Performed by: INTERNAL MEDICINE

## 2024-04-19 PROCEDURE — 99999 PR PBB SHADOW E&M-EST. PATIENT-LVL IV: CPT | Mod: PBBFAC,,, | Performed by: INTERNAL MEDICINE

## 2024-04-19 PROCEDURE — 3008F BODY MASS INDEX DOCD: CPT | Mod: CPTII,S$GLB,, | Performed by: INTERNAL MEDICINE

## 2024-04-19 PROCEDURE — 1101F PT FALLS ASSESS-DOCD LE1/YR: CPT | Mod: CPTII,S$GLB,, | Performed by: INTERNAL MEDICINE

## 2024-04-19 PROCEDURE — 3288F FALL RISK ASSESSMENT DOCD: CPT | Mod: CPTII,S$GLB,, | Performed by: INTERNAL MEDICINE

## 2024-04-19 PROCEDURE — 1159F MED LIST DOCD IN RCRD: CPT | Mod: CPTII,S$GLB,, | Performed by: INTERNAL MEDICINE

## 2024-04-19 PROCEDURE — 99215 OFFICE O/P EST HI 40 MIN: CPT | Mod: S$GLB,,, | Performed by: INTERNAL MEDICINE

## 2024-04-19 ASSESSMENT — ROUTINE ASSESSMENT OF PATIENT INDEX DATA (RAPID3)
FATIGUE SCORE: 1.1
TOTAL RAPID3 SCORE: 1.67
MDHAQ FUNCTION SCORE: 0.3
PSYCHOLOGICAL DISTRESS SCORE: 0
PATIENT GLOBAL ASSESSMENT SCORE: 2
PAIN SCORE: 2

## 2024-04-19 NOTE — PROGRESS NOTES
Subjective:          Chief Complaint: Charla Patrick is a 64 y.o. female who had concerns including Disease Management.    HPI:  Patient is a 64-year-old female previously seen [place] for psoriatic arthritis moved from [place] patient was seen in the [facility] in [place] her last office visit being 01/12/2021. Diagnosed approx 2006 - acute psoriasis.   Joints began shortly after the skin, joint pain was located in the fingers, hips, and both feet (chronic plantar fasciitis) .      Aggravating factors with the morning evening and during activity.    Alleviating factors were exercise and medications associated factors joint stiffness, fatigue, lack of sleep, joint pain, joint swelling, abdominal pain and night sweats.      She also notes dry eyes and dry mouth psoriasis family history of other autoimmune conditions.    Patient denies ulcerative colitis, crohns or other colitis. Only IBS to date.   Previous issues with continued GI complaints and c/o depression more difficult to manage with Otezla so discontinued.   Small recurrent scalp lesion is most prevalent historically.   Started Cosentyx (8/2021-8/2022)- despite titration to 300mg q month still with progressive arthritis.   Stelara started 9/2022- present.     Left elbow with injury still hurting.   Right lateral hip waking with pain. Typically it will stop or be brief now seems present every morning for over 30min.   Rates all this pain 2/10 so when comparing to previous visit. Seems to be doing well with Stelara.   Morning stiffness: 15-20min but ++gelling phenomena.   Multi-Dimensional Health Assessment 8/25/2022 12/19/2022 4/19/2023   MHAQ Score 0.7 0.4 0.4   Psychologic Score 1.1 2.2 1.1   Pain Score 6 4 2   Fatigue Score 2.2 1.1 0   Global Health Score 6 3 1   RAPID3 Score 4.78 2.78 1.44     8/2023:  2.33   12/2023: 2.11 global 2.0    Current medication:  Stelara 45mg Q 12 w (start 9/2022)   MTX 4 tabs weekly  Folic acid 1  mg daily  tizandine 4mg tablets.   Tylenol PRN    Previous medications trialed:    Methotrexate began 2007.  Discontinued methotrexate 05/2017 no ASE or LAE.   Enbrel without adverse side effects no mention exact type.  Humira was loss of back efficacy over time,  Cimzia no benefit.   Otezla 30 mg once daily ([id number])- losing efficacy  Celebrex but developed a  gastritis despite this.  Cosentyx 300mg monthly (DC 9/2022) loss of efficacy  No personal hx of Cancer  No DVT/seizure or stroke, startin estradiol     Patient also has osteopenia her last DEXA 2018 was osteopenia but her FRAX score was below recommended bisphosphonate or other therapeutic ranges so she was continued on regular weight-bearing calcium and vitamin-D and repeat DXA is every 2 years    [facility] 7 10/23/2019 ultrasound bilateral hands  Left hand visualized portions of the bone muscle tendon joints and median nerve are normal appearing particular evaluation at the MCP 2 3 and 5 transverse views also obtained no evidence of any erosions power Doppler use for synovitis and tenosynovitis which was also negative.  I have a TB from 03/23/2020 as a QuantiFERON gold that is negative      REVIEW OF SYSTEMS:    Review of Systems   Constitutional:  Negative for fever, malaise/fatigue and weight loss.   HENT:  Negative for sore throat.    Eyes:  Negative for double vision, photophobia and redness.   Respiratory:  Negative for cough, shortness of breath and wheezing.    Cardiovascular:  Negative for chest pain, palpitations and orthopnea.   Gastrointestinal:  Negative for abdominal pain, constipation and diarrhea.   Genitourinary:  Negative for dysuria, hematuria and urgency.   Musculoskeletal:  Positive for joint pain. Negative for back pain and myalgias.   Skin:  Negative for rash.   Neurological:  Negative for dizziness, tingling, focal weakness and headaches.   Endo/Heme/Allergies:  Does not bruise/bleed easily.   Psychiatric/Behavioral:  Negative for  depression, hallucinations and suicidal ideas.                Objective:            Past Medical History:   Diagnosis Date    Allergic rhinitis     Diabetes mellitus, type 2     History of colon polyps     Hyperlipidemia     Psoriatic arthritis      Family History   Problem Relation Name Age of Onset    Heart disease Mother      Diabetes Mother      Brain cancer Father          Glioblastoma     Psoriasis Father      Thyroid disease Sister          hypothyroidism     Diabetes Sister      Psoriasis Sister      Cerebral palsy Daughter      Heart disease Daughter      Diabetes Daughter      No Known Problems Son      Stroke Maternal Grandmother      Diabetes Maternal Grandmother      Glaucoma Maternal Grandmother      Diabetes Maternal Grandfather      Ovarian cancer Paternal Grandmother      Psoriasis Paternal Grandmother      Pancreatic cancer Paternal Grandfather      No Known Problems Sister      Psoriasis Brother      Macular degeneration Neg Hx      Eczema Neg Hx      Lupus Neg Hx      Melanoma Neg Hx       Social History     Tobacco Use    Smoking status: Former     Current packs/day: 0.00     Average packs/day: 1 pack/day for 40.0 years (40.0 ttl pk-yrs)     Types: Cigarettes     Start date: 1979     Quit date: 2019     Years since quittin.1    Smokeless tobacco: Never    Tobacco comments:     former smoker   Substance Use Topics    Alcohol use: Never    Drug use: Never         Current Outpatient Medications on File Prior to Visit   Medication Sig Dispense Refill    ascorbic acid, vitamin C, (VITAMIN C) 500 MG tablet Take 500 mg by mouth once daily.      aspirin 81 MG Chew Take 81 mg by mouth once daily.      blood sugar diagnostic (FREESTYLE LITE STRIPS) Strp TEST BLOOD SUGAR TWICE DAILY 200 strip 3    cholecalciferol, vitamin D3, (VITAMIN D3) 50 mcg (2,000 unit) Cap capsule Take 2,000 Units by mouth.      clobetasol 0.05% (TEMOVATE) 0.05 % Oint Clobetasol propionate 0.05% once daily at night for  4 weeks, then alternate nights for 4 weeks, and then twice weekly for 4 weeks 45 g 2    famotidine (PEPCID) 40 MG tablet TAKE 1 TABLET DAILY 90 tablet 3    fluocinonide 0.05% (LIDEX) 0.05 % cream Apply topically 2 (two) times daily. 120 g 3    folic acid (FOLVITE) 1 MG tablet Take 1 tablet (1 mg total) by mouth once daily. 100 tablet 3    loratadine (CLARITIN) 10 mg tablet TAKE 1 TABLET DAILY 90 tablet 3    methotrexate 2.5 MG Tab TAKE 4 TABLETS EVERY 7 DAYS 48 tablet 3    pantoprazole (PROTONIX) 40 MG tablet TAKE 1 TABLET DAILY 90 tablet 3    rosuvastatin (CRESTOR) 5 MG tablet Take 1 tablet (5 mg total) by mouth every evening. 90 tablet 3    semaglutide (OZEMPIC) 1 mg/dose (4 mg/3 mL) Inject 1 mg into the skin every 7 days. 1 each 11    sulfacetamide sodium-sulfur 10-5 % (w/w) Clsr Use to wash face daily 340 g 11    turmeric root extract 500 mg Cap Take 500 mg by mouth once daily at 6am.      ustekinumab (STELARA) 45 mg/0.5 mL Syrg syringe AT THE START OF THERAPY, INJECT 45 MG UNDER THE SKIN ON WEEK 0 AND WEEK 4, THEN EVERY 12 WEEKS THEREAFTER 0.5 mL 3    vitamin B complex (SUPER B COMPLEX-B-12 ORAL)       estradioL (ESTRACE) 0.01 % (0.1 mg/gram) vaginal cream Place 2 g vaginally every evening for 14 days, THEN 1 g every evening for 14 days, THEN 1 g twice a week. Continue 1g twice weekly for maintenance.. 2 each 1    tiZANidine (ZANAFLEX) 4 MG tablet Take 1 tablet (4 mg total) by mouth nightly as needed (spasm of muscle). (Patient not taking: Reported on 4/19/2024) 90 tablet 3     No current facility-administered medications on file prior to visit.       Vitals:    04/19/24 1359   BP: 127/74   Pulse: 71       Physical Exam:    Physical Exam  Constitutional:       Appearance: She is well-developed.   Eyes:      General: Lids are normal.   Skin:     Comments: Flare psoriasis EAC bilaterally with prominent scale and erythema.    Neurological:      Mental Status: She is oriented to person, place, and time.    Psychiatric:         Behavior: Behavior normal.         Thought Content: Thought content normal.     There is currently no information documented on the homunculus. Go to the Rheumatology activity and complete the homunculus joint exam.          Rapid 3: 4.78. (8/2022)           Assessment:       Encounter Diagnoses   Name Primary?    Psoriatic arthritis Yes    Scalp psoriasis     Immunosuppression due to drug therapy                 Plan:        Psoriatic arthritis    Scalp psoriasis    Immunosuppression due to drug therapy           Patient with long standing PsA:    -Started Stelara 9/2022 [facility]   [facility] no PA needed per [facility] 9/2022 [facility] which I did on 9/7/2022 with 5 refills. Patient is due 1/4/2023.     Enbrel without adverse side effects no mention exact type.  Humira was loss of back efficacy over time,  Cimzia no benefit.   Otezla 30 mg once daily ([id number])- losing efficacy  Celebrex but developed a  gastritis despite this.  Cosentyx 300mg monthly still with rising Rapid 3 and skin changes.       Continue Stelara  Continue Methotrexate. She is to take 4 tablets ONE TIME WEEKLY. Folic acid is one tablet daily  Labs in 3 months.      No follow-ups on file.        40min consultation with greater than 50% of that time included Preparing to see the patient (review records, tests), Obtaining and/or reviewing separately obtained historical data, Performing a medically appropriate examination and/or evaluation , Ordering medications, tests, and/or procedures, Referring and communicating with other healthcare professionals , Documenting clinical information in the electronic or other health record and Independently interpreting results  (as warranted) & communicating results to the patient/family/caregiver. All questions answered.

## 2024-04-22 ENCOUNTER — TELEPHONE (OUTPATIENT)
Dept: CARDIOLOGY | Facility: CLINIC | Age: 64
End: 2024-04-22
Payer: MEDICARE

## 2024-04-22 NOTE — TELEPHONE ENCOUNTER
----- Message from Mone Mujica sent at 4/22/2024 11:47 AM CDT -----  Regarding: appointment  Contact: patient  Type:  Sooner Appointment Request    Caller is requesting a sooner appointment.  Caller declined first available appointment listed below.  Caller will not accept being placed on the waitlist and is requesting a message be sent to doctor.    Name of Caller:  patient  When is the first available appointment?  05/21/24  Symptoms:  referral  Would the patient rather a call back or a response via [facility]? call  Best Call Back Number: [phone]    Additional Information:  Please call patient to advise.  Thanks!  
Confirmed appt on 5/17/24. Verbalized understanding.  
positive

## 2024-05-17 ENCOUNTER — OFFICE VISIT (OUTPATIENT)
Dept: CARDIOLOGY | Facility: CLINIC | Age: 64
End: 2024-05-17
Payer: MEDICARE

## 2024-05-17 VITALS
WEIGHT: 179.25 LBS | HEART RATE: 66 BPM | BODY MASS INDEX: 29.87 KG/M2 | DIASTOLIC BLOOD PRESSURE: 79 MMHG | SYSTOLIC BLOOD PRESSURE: 124 MMHG | HEIGHT: 65 IN

## 2024-05-17 DIAGNOSIS — R09.89 ARTERIAL BRUIT: ICD-10-CM

## 2024-05-17 DIAGNOSIS — E66.3 OVERWEIGHT (BMI 25.0-29.9): Chronic | ICD-10-CM

## 2024-05-17 DIAGNOSIS — Z87.891 H/O TOBACCO USE, PRESENTING HAZARDS TO HEALTH: Chronic | ICD-10-CM

## 2024-05-17 DIAGNOSIS — E78.5 HYPERLIPIDEMIA ASSOCIATED WITH TYPE 2 DIABETES MELLITUS: Chronic | ICD-10-CM

## 2024-05-17 DIAGNOSIS — I73.9 CLAUDICATION: Chronic | ICD-10-CM

## 2024-05-17 DIAGNOSIS — R06.02 SOB (SHORTNESS OF BREATH): ICD-10-CM

## 2024-05-17 DIAGNOSIS — R94.31 LEFT AXIS DEVIATION: ICD-10-CM

## 2024-05-17 DIAGNOSIS — E11.69 HYPERLIPIDEMIA ASSOCIATED WITH TYPE 2 DIABETES MELLITUS: Chronic | ICD-10-CM

## 2024-05-17 DIAGNOSIS — I25.10 CORONARY ARTERY CALCIFICATION SEEN ON CAT SCAN: Primary | ICD-10-CM

## 2024-05-17 DIAGNOSIS — I70.0 THORACIC AORTIC ATHEROSCLEROSIS: ICD-10-CM

## 2024-05-17 DIAGNOSIS — R07.89 CHEST PRESSURE: ICD-10-CM

## 2024-05-17 PROBLEM — R01.1 SYSTOLIC MURMUR: Status: ACTIVE | Noted: 2024-05-17

## 2024-05-17 LAB
OHS QRS DURATION: 82 MS
OHS QTC CALCULATION: 435 MS

## 2024-05-17 PROCEDURE — 1159F MED LIST DOCD IN RCRD: CPT | Mod: CPTII,S$GLB,, | Performed by: INTERNAL MEDICINE

## 2024-05-17 PROCEDURE — 99204 OFFICE O/P NEW MOD 45 MIN: CPT | Mod: 25,S$GLB,, | Performed by: INTERNAL MEDICINE

## 2024-05-17 PROCEDURE — 3008F BODY MASS INDEX DOCD: CPT | Mod: CPTII,S$GLB,, | Performed by: INTERNAL MEDICINE

## 2024-05-17 PROCEDURE — 3044F HG A1C LEVEL LT 7.0%: CPT | Mod: CPTII,S$GLB,, | Performed by: INTERNAL MEDICINE

## 2024-05-17 PROCEDURE — 93010 ELECTROCARDIOGRAM REPORT: CPT | Mod: S$GLB,,, | Performed by: INTERNAL MEDICINE

## 2024-05-17 PROCEDURE — 1101F PT FALLS ASSESS-DOCD LE1/YR: CPT | Mod: CPTII,S$GLB,, | Performed by: INTERNAL MEDICINE

## 2024-05-17 PROCEDURE — 3074F SYST BP LT 130 MM HG: CPT | Mod: CPTII,S$GLB,, | Performed by: INTERNAL MEDICINE

## 2024-05-17 PROCEDURE — 99999 PR PBB SHADOW E&M-EST. PATIENT-LVL V: CPT | Mod: PBBFAC,,, | Performed by: INTERNAL MEDICINE

## 2024-05-17 PROCEDURE — 3078F DIAST BP <80 MM HG: CPT | Mod: CPTII,S$GLB,, | Performed by: INTERNAL MEDICINE

## 2024-05-17 PROCEDURE — 3288F FALL RISK ASSESSMENT DOCD: CPT | Mod: CPTII,S$GLB,, | Performed by: INTERNAL MEDICINE

## 2024-05-17 PROCEDURE — 93005 ELECTROCARDIOGRAM TRACING: CPT | Mod: PO

## 2024-05-17 NOTE — PROGRESS NOTES
"Subjective:    Patient ID:  Charla Patrick is a 64 y.o. female who presents for Heart Problem and Coronary Artery Disease        HPI    NEW PATIENT EVALUATION WAS SEEN BY DR. DRUMMOND IN THE PAST RECENT CT SCAN SHOWED CORONARY CALCIFICATION AND THORACIC AORTIC CALCIFICATION, RECENT LABS CMP WITH GFR OF 56, LAST HBA1C 6.4% LAST LIPIDS LDL 47 HDL 45, TRIGLYCERIDE 153, HAD CATH 15 YEARS AGO, MILD CAD, ? ANOMALOUS CORONARY ?, WAS ON BB, LATELY HAS BEEN HAVING CHEST PRESSURE, WITH MOVEMENT , STRESS, BENDING OVER, FORMER SMOKER STOPPED 5 YEARS AGO, MILD / MOD [place], NOT ACTIVE, SEE ROS  Past Medical History:   Diagnosis Date    Allergic rhinitis     Diabetes mellitus, type 2     History of colon polyps     Hyperlipidemia     Psoriatic arthritis      Past Surgical History:   Procedure Laterality Date    APPENDECTOMY      BREAST LUMPECTOMY      BREAST SURGERY      left, "benign tumor"    COLONOSCOPY N/A 08/10/2021    Procedure: COLONOSCOPY  Banding of hemorrhoids possible;  Surgeon: Faizan Mejia MD;  Location: [facility];  Service: Endoscopy;  Laterality: N/A;    FOOT SURGERY Bilateral     plantar fasciitis and neuroma by [place]    HYSTERECTOMY      OOPHORECTOMY       Family History   Problem Relation Name Age of Onset    Heart disease Mother      Diabetes Mother      Brain cancer Father          Glioblastoma     Psoriasis Father      Thyroid disease Sister          hypothyroidism     Diabetes Sister      Psoriasis Sister      Cerebral palsy Daughter      Heart disease Daughter      Diabetes Daughter      No Known Problems Son      Stroke Maternal Grandmother      Diabetes Maternal Grandmother      Glaucoma Maternal Grandmother      Diabetes Maternal Grandfather      Ovarian cancer Paternal Grandmother      Psoriasis Paternal Grandmother      Pancreatic cancer Paternal Grandfather      No Known Problems Sister      Psoriasis Brother      Macular degeneration Neg Hx      Eczema Neg Hx      Lupus Neg Hx      Melanoma Neg Hx "       Social History     Socioeconomic History    Marital status:    Tobacco Use    Smoking status: Former     Current packs/day: 0.00     Average packs/day: 1 pack/day for 40.0 years (40.0 ttl pk-yrs)     Types: Cigarettes     Start date: 1979     Quit date: 2019     Years since quittin.2    Smokeless tobacco: Never    Tobacco comments:     former smoker   Substance and Sexual Activity    Alcohol use: Never    Drug use: Never    Sexual activity: Never     Partners: Male     Social Determinants of Health     Financial Resource Strain: Low Risk  (2024)    Overall Financial Resource Strain (CARDIA)     Difficulty of Paying Living Expenses: Not hard at all   Food Insecurity: No Food Insecurity (2024)    Hunger Vital Sign     Worried About Running Out of Food in the Last Year: Never true     Ran Out of Food in the Last Year: Never true   Transportation Needs: No Transportation Needs (2024)    PRAPARE - Transportation     Lack of Transportation (Medical): No     Lack of Transportation (Non-Medical): No   Physical Activity: Inactive (2024)    Exercise Vital Sign     Days of Exercise per Week: 0 days     Minutes of Exercise per Session: 0 min   Stress: Stress Concern Present (2024)    [employer] - Occupational Stress Questionnaire     Feeling of Stress : To some extent   Housing Stability: Unknown (2024)    Housing Stability Vital Sign     Unable to Pay for Housing in the Last Year: No       Review of patient's allergies indicates:   Allergen Reactions    Court Other (See Comments)     Angioedema     Codeine      Nausea and Shaky    Zoloft [sertraline]      Feeling a roller coaster in chest up to neck     Enbrel [etanercept] Rash    Lamisil [terbinafine hcl] Rash       Current Outpatient Medications:     aspirin 81 MG Chew, Take 81 mg by mouth once daily., Disp: , Rfl:     blood sugar diagnostic (FREESTYLE LITE STRIPS) Strp, TEST BLOOD  SUGAR TWICE DAILY, Disp: 200 strip, Rfl: 3    cholecalciferol, vitamin D3, (VITAMIN D3) 50 mcg (2,000 unit) Cap capsule, Take 2,000 Units by mouth., Disp: , Rfl:     famotidine (PEPCID) 40 MG tablet, TAKE 1 TABLET DAILY, Disp: 90 tablet, Rfl: 3    folic acid (FOLVITE) 1 MG tablet, Take 1 tablet (1 mg total) by mouth once daily., Disp: 100 tablet, Rfl: 3    methotrexate 2.5 MG Tab, TAKE 4 TABLETS EVERY 7 DAYS, Disp: 48 tablet, Rfl: 3    pantoprazole (PROTONIX) 40 MG tablet, TAKE 1 TABLET DAILY, Disp: 90 tablet, Rfl: 3    rosuvastatin (CRESTOR) 5 MG tablet, Take 1 tablet (5 mg total) by mouth every evening., Disp: 90 tablet, Rfl: 3    semaglutide (OZEMPIC) 1 mg/dose (4 mg/3 mL), Inject 1 mg into the skin every 7 days., Disp: 1 each, Rfl: 11    sulfacetamide sodium-sulfur 10-5 % (w/w) Clsr, Use to wash face daily, Disp: 340 g, Rfl: 11    tiZANidine (ZANAFLEX) 4 MG tablet, Take 1 tablet (4 mg total) by mouth nightly as needed (spasm of muscle)., Disp: 90 tablet, Rfl: 3    turmeric root extract 500 mg Cap, Take 500 mg by mouth once daily at 6am., Disp: , Rfl:     ustekinumab (STELARA) 45 mg/0.5 mL Syrg syringe, AT THE START OF THERAPY, INJECT 45 MG UNDER THE SKIN ON WEEK 0 AND WEEK 4, THEN EVERY 12 WEEKS THEREAFTER, Disp: 0.5 mL, Rfl: 3    vitamin B complex (SUPER B COMPLEX-B-12 ORAL), , Disp: , Rfl:     ascorbic acid, vitamin C, (VITAMIN C) 500 MG tablet, Take 500 mg by mouth once daily. (Patient not taking: Reported on 5/17/2024), Disp: , Rfl:     clobetasol 0.05% (TEMOVATE) 0.05 % Oint, Clobetasol propionate 0.05% once daily at night for 4 weeks, then alternate nights for 4 weeks, and then twice weekly for 4 weeks (Patient not taking: Reported on 5/17/2024), Disp: 45 g, Rfl: 2    estradioL (ESTRACE) 0.01 % (0.1 mg/gram) vaginal cream, Place 2 g vaginally every evening for 14 days, THEN 1 g every evening for 14 days, THEN 1 g twice a week. Continue 1g twice weekly for maintenance.. (Patient not taking: Reported on  "5/17/2024), Disp: 2 each, Rfl: 1    fluocinonide 0.05% (LIDEX) 0.05 % cream, Apply topically 2 (two) times daily. (Patient not taking: Reported on 5/17/2024), Disp: 120 g, Rfl: 3    loratadine (CLARITIN) 10 mg tablet, TAKE 1 TABLET DAILY (Patient not taking: Reported on 5/17/2024), Disp: 90 tablet, Rfl: 3    Review of Systems   Constitutional: Positive for malaise/fatigue (LACK OF ENERGY). Negative for chills, decreased appetite, diaphoresis, fever, night sweats and weight gain.   HENT:  Negative for congestion and nosebleeds.    Cardiovascular:  Positive for chest pain, claudication, dyspnea on exertion and palpitations (OCC). Negative for cyanosis (B), leg swelling, near-syncope, orthopnea, paroxysmal nocturnal dyspnea and syncope.   Respiratory:  Negative for cough, hemoptysis and wheezing.    Endocrine: Negative for polyphagia and polyuria.   Hematologic/Lymphatic: Negative for adenopathy. Does not bruise/bleed easily.   Musculoskeletal:  Positive for arthritis (PSORIATIC ARTHRITIS) and joint pain (FEET). Negative for falls.   Gastrointestinal:  Negative for abdominal pain, dysphagia, jaundice and melena. Heartburn: ON MEDS.  Genitourinary:  Negative for dysuria and flank pain.   Neurological:  Negative for brief paralysis.   Psychiatric/Behavioral:  Negative for altered mental status and depression. Nervous/anxious: STRESS.         Objective:      Vitals:    05/17/24 0823   BP: 124/79   Pulse: 66   Weight: 81.3 kg (179 lb 3.7 oz)   Height: 5' 5" (1.651 m)   PainSc: 0-No pain     Body mass index is 29.83 kg/m².    Physical Exam  Constitutional:       Appearance: She is overweight.   Neck:      Vascular: Normal carotid pulses. Carotid bruit present. No JVD.   Cardiovascular:      Rate and Rhythm: Normal rate and regular rhythm. No extrasystoles are present.     Pulses:           Carotid pulses are 2+ on the right side and 2+ on the left side with bruit.       Radial pulses are 2+ on the right side and 2+ on the " left side.        Femoral pulses are 2+ on the right side and 2+ on the left side.       Posterior tibial pulses are 2+ on the right side and 2+ on the left side.      Heart sounds: Murmur heard.      Systolic murmur is present with a grade of 1/6 at the upper right sternal border.      No gallop. No S4 sounds.   Pulmonary:      Effort: Pulmonary effort is normal.      Breath sounds: Normal breath sounds and air entry.   Chest:      Chest wall: No deformity.   Abdominal:      Palpations: Abdomen is soft. There is no hepatomegaly.   Musculoskeletal:      Right lower leg: No edema.      Left lower leg: No edema.   Skin:     General: Skin is warm and dry.      Capillary Refill: Capillary refill takes less than 2 seconds.   Neurological:      Mental Status: She is alert and oriented to person, place, and time.      Cranial Nerves: Cranial nerves 2-12 are intact.   Psychiatric:         Mood and Affect: Mood normal.         Speech: Speech normal.         Behavior: Behavior normal.                 ..    Chemistry        Component Value Date/Time     03/12/2024 0946    K 4.4 03/12/2024 0946     03/12/2024 0946    CO2 25 03/12/2024 0946    BUN 13 03/12/2024 0946    CREATININE 1.1 03/12/2024 0946     (H) 03/12/2024 0946        Component Value Date/Time    CALCIUM 9.9 03/12/2024 0946    ALKPHOS 92 03/12/2024 0946    AST 26 03/12/2024 0946    ALT 36 03/12/2024 0946    BILITOT 0.5 03/12/2024 0946    ESTGFRAFRICA >60.0 04/21/2022 1014    EGFRNONAA >60.0 04/21/2022 1014            ..  Lab Results   Component Value Date    CHOL 123 09/01/2023    CHOL 158 05/30/2023    CHOL 131 07/11/2022     Lab Results   Component Value Date    HDL 45 09/01/2023    HDL 45 05/30/2023    HDL 50 07/11/2022     Lab Results   Component Value Date    LDLCALC 47.4 (L) 09/01/2023    LDLCALC 61.8 (L) 05/30/2023    LDLCALC 58.4 (L) 07/11/2022     Lab Results   Component Value Date    TRIG 153 (H) 09/01/2023    TRIG 256 (H) 05/30/2023     TRIG 113 07/11/2022     Lab Results   Component Value Date    CHOLHDL 36.6 09/01/2023    CHOLHDL 28.5 05/30/2023    CHOLHDL 38.2 07/11/2022     ..  Lab Results   Component Value Date    WBC 5.66 03/12/2024    HGB 13.4 03/12/2024    HCT 41.9 03/12/2024    MCV 92 03/12/2024     03/12/2024       Test(s) Reviewed  I have reviewed the following in detail:  [] Stress test   [] Angiography   [] Echocardiogram   [x] Labs   [x] Other:       Assessment:         ICD-10-CM ICD-9-CM   1. Coronary artery calcification seen on CAT scan  I25.10 414.00   2. Chest pressure  R07.89 786.59   3. SOB (shortness of breath)  R06.02 786.05   4. Thoracic aortic atherosclerosis  I70.0 440.0   5. Left axis deviation  R94.31 794.31   6. Overweight (BMI 25.0-29.9)  E66.3 278.02   7. Hyperlipidemia associated with type 2 diabetes mellitus  E11.69 250.80    E78.5 272.4   8. Arterial bruit  R09.89 785.9   9. Claudication  I73.9 443.9   10. H/O tobacco use, presenting hazards to health  Z87.891 V15.82     Problem List Items Addressed This Visit          Pulmonary    SOB (shortness of breath)    Relevant Orders    Echo    Nuclear Stress - Cardiology Interpreted       Cardiac/Vascular    Hyperlipidemia    Thoracic aortic atherosclerosis    Left axis deviation    Relevant Orders    Nuclear Stress - Cardiology Interpreted    Coronary artery calcification seen on CAT scan - Primary    Relevant Orders    IN OFFICE EKG 12-LEAD (to [place])    IN OFFICE EKG 12-LEAD (to [place]) (Completed)    Echo    Nuclear Stress - Cardiology Interpreted    Arterial bruit    Relevant Orders    CV Ultrasound Bilateral Doppler Carotid    Claudication    Relevant Orders    CV Ultrasound doppler arterial legs bilat       Endocrine    Overweight (BMI 25.0-29.9)       Other    Chest pressure    Relevant Orders    Echo    Nuclear Stress - Cardiology Interpreted    H/O tobacco use, presenting hazards to health        Plan:         EKG SINUS RHYTHM LEFT AXIS DEVIATION WILL NEED  FURTHER EVALUATION IN VIEW OF SYMPTOMS CORONARY CALCIFICATION HIGH CV RISK WITH DIABETES MULTIPLE RISK FACTORS, CHECK ECHOCARDIOGRAM NUCLEAR STRESS TEST UNABLE TO WALK TREADMILL, CAROTID ULTRASOUND AND ARTERIAL DOPPLER OF THE LOWER EXTREMITIES ASSESS ANGINA SYMPTOMS AND CLAUDICATION SYMPTOMS DISCUSSED PLAN AT LENGTH WITH THE PATIENT AND HER  RETURN TO CLINIC IN FEW WEEKS AFTER TESTS  Coronary artery calcification seen on CAT scan  -     Ambulatory referral/consult to Cardiology  -     IN OFFICE EKG 12-LEAD (to [place])  -     IN OFFICE EKG 12-LEAD (to [place])  -     Echo  -     Nuclear Stress - Cardiology Interpreted; Future    Chest pressure  -     Echo  -     Nuclear Stress - Cardiology Interpreted; Future    SOB (shortness of breath)  -     Echo  -     Nuclear Stress - Cardiology Interpreted; Future    Thoracic aortic atherosclerosis    Left axis deviation  -     Nuclear Stress - Cardiology Interpreted; Future    Overweight (BMI 25.0-29.9)    Hyperlipidemia associated with type 2 diabetes mellitus    Arterial bruit  -     CV Ultrasound Bilateral Doppler Carotid; Future    Claudication  -     CV Ultrasound doppler arterial legs bilat; Future    H/O tobacco use, presenting hazards to health    RTC Low level/low impact aerobic exercise 5x's/wk. Heart healthy diet and risk factor modification.    See labs and med orders.    Aerobic exercise 5x's/wk. Heart healthy diet and risk factor modification.    See labs and med orders.

## 2024-05-22 ENCOUNTER — PATIENT MESSAGE (OUTPATIENT)
Dept: FAMILY MEDICINE | Facility: CLINIC | Age: 64
End: 2024-05-22
Payer: MEDICARE

## 2024-05-28 ENCOUNTER — TELEPHONE (OUTPATIENT)
Dept: CARDIOLOGY | Facility: CLINIC | Age: 64
End: 2024-05-28
Payer: MEDICARE

## 2024-05-28 ENCOUNTER — PATIENT MESSAGE (OUTPATIENT)
Dept: CARDIOLOGY | Facility: CLINIC | Age: 64
End: 2024-05-28
Payer: MEDICARE

## 2024-05-29 ENCOUNTER — PATIENT MESSAGE (OUTPATIENT)
Dept: CARDIOLOGY | Facility: HOSPITAL | Age: 64
End: 2024-05-29
Payer: MEDICARE

## 2024-05-31 ENCOUNTER — HOSPITAL ENCOUNTER (OUTPATIENT)
Dept: RADIOLOGY | Facility: HOSPITAL | Age: 64
Discharge: HOME | End: 2024-05-31
Attending: INTERNAL MEDICINE
Payer: MEDICARE

## 2024-05-31 ENCOUNTER — HOSPITAL ENCOUNTER (OUTPATIENT)
Dept: CARDIOLOGY | Facility: HOSPITAL | Age: 64
Discharge: HOME | End: 2024-05-31
Attending: INTERNAL MEDICINE
Payer: MEDICARE

## 2024-05-31 VITALS — HEIGHT: 65 IN | WEIGHT: 179 LBS | BODY MASS INDEX: 29.82 KG/M2

## 2024-05-31 DIAGNOSIS — R94.31 LEFT AXIS DEVIATION: ICD-10-CM

## 2024-05-31 DIAGNOSIS — R06.02 SOB (SHORTNESS OF BREATH): ICD-10-CM

## 2024-05-31 DIAGNOSIS — I25.10 CORONARY ARTERY CALCIFICATION SEEN ON CAT SCAN: ICD-10-CM

## 2024-05-31 DIAGNOSIS — R07.89 CHEST PRESSURE: ICD-10-CM

## 2024-05-31 LAB
CV PHARM DOSE: 0.4 MG
CV STRESS BASE HR: 75 BPM
DIASTOLIC BLOOD PRESSURE: 73 MMHG
NUC REST EJECTION FRACTION: 73
OHS CV CPX 1 MINUTE RECOVERY HEART RATE: 100 BPM
OHS CV CPX 85 PERCENT MAX PREDICTED HEART RATE MALE: 132
OHS CV CPX MAX PREDICTED HEART RATE: 155
OHS CV CPX PATIENT IS FEMALE: 1
OHS CV CPX PATIENT IS MALE: 0
OHS CV CPX PEAK DIASTOLIC BLOOD PRESSURE: 73 MMHG
OHS CV CPX PEAK HEAR RATE: 102 BPM
OHS CV CPX PEAK RATE PRESSURE PRODUCT: NORMAL
OHS CV CPX PEAK SYSTOLIC BLOOD PRESSURE: 135 MMHG
OHS CV CPX PERCENT MAX PREDICTED HEART RATE ACHIEVED: 69
OHS CV CPX RATE PRESSURE PRODUCT PRESENTING: 9150
OHS CV PHARM TIME: 925 MIN
SYSTOLIC BLOOD PRESSURE: 122 MMHG

## 2024-05-31 PROCEDURE — 93306 TTE W/DOPPLER COMPLETE: CPT | Mod: PO

## 2024-05-31 PROCEDURE — 93016 CV STRESS TEST SUPVJ ONLY: CPT | Mod: ,,, | Performed by: INTERNAL MEDICINE

## 2024-05-31 PROCEDURE — 78452 HT MUSCLE IMAGE SPECT MULT: CPT | Mod: 26,,, | Performed by: INTERNAL MEDICINE

## 2024-05-31 PROCEDURE — 93017 CV STRESS TEST TRACING ONLY: CPT | Mod: PO

## 2024-05-31 PROCEDURE — 63600175 PHARM REV CODE 636 W HCPCS: Mod: PO | Performed by: INTERNAL MEDICINE

## 2024-05-31 PROCEDURE — 93018 CV STRESS TEST I&R ONLY: CPT | Mod: ,,, | Performed by: INTERNAL MEDICINE

## 2024-05-31 PROCEDURE — A9502 TC99M TETROFOSMIN: HCPCS | Mod: PO | Performed by: INTERNAL MEDICINE

## 2024-05-31 PROCEDURE — 78452 HT MUSCLE IMAGE SPECT MULT: CPT | Mod: PO

## 2024-05-31 PROCEDURE — 93306 TTE W/DOPPLER COMPLETE: CPT | Mod: 26,,, | Performed by: INTERNAL MEDICINE

## 2024-05-31 RX ORDER — REGADENOSON 0.08 MG/ML
0.4 INJECTION, SOLUTION INTRAVENOUS
Status: COMPLETED | OUTPATIENT
Start: 2024-05-31 | End: 2024-05-31

## 2024-05-31 RX ADMIN — TETROFOSMIN 10.4 MILLICURIE: 1.38 INJECTION, POWDER, LYOPHILIZED, FOR SOLUTION INTRAVENOUS at 09:05

## 2024-05-31 RX ADMIN — REGADENOSON 0.4 MG: 0.08 INJECTION, SOLUTION INTRAVENOUS at 09:05

## 2024-05-31 RX ADMIN — TETROFOSMIN 33 MILLICURIE: 1.38 INJECTION, POWDER, LYOPHILIZED, FOR SOLUTION INTRAVENOUS at 09:05

## 2024-06-02 LAB
ASCENDING AORTA: 2.74 CM
AV INDEX (PROSTH): 1.02
AV MEAN GRADIENT: 4 MMHG
AV PEAK GRADIENT: 7 MMHG
AV VALVE AREA BY VELOCITY RATIO: 3.66 CM²
AV VALVE AREA: 3.6 CM²
AV VELOCITY RATIO: 1.04
BSA FOR ECHO PROCEDURE: 1.93 M2
CV ECHO LV RWT: 0.39 CM
DOP CALC AO PEAK VEL: 1.32 M/S
DOP CALC AO VTI: 26.1 CM
DOP CALC LVOT AREA: 3.5 CM2
DOP CALC LVOT DIAMETER: 2.12 CM
DOP CALC LVOT PEAK VEL: 1.37 M/S
DOP CALC LVOT STROKE VOLUME: 93.85 CM3
DOP CALCLVOT PEAK VEL VTI: 26.6 CM
E WAVE DECELERATION TIME: 164.8 MSEC
E/A RATIO: 0.87
E/E' RATIO: 8.94 M/S
ECHO LV POSTERIOR WALL: 0.94 CM (ref 0.6–1.1)
EJECTION FRACTION: 55 %
FRACTIONAL SHORTENING: 29 % (ref 28–44)
INTERVENTRICULAR SEPTUM: 0.77 CM (ref 0.6–1.1)
LEFT ATRIUM SIZE: 3.58 CM
LEFT ATRIUM VOLUME INDEX MOD: 34.5 ML/M2
LEFT ATRIUM VOLUME MOD: 65.27 CM3
LEFT INTERNAL DIMENSION IN SYSTOLE: 3.46 CM (ref 2.1–4)
LEFT VENTRICLE DIASTOLIC VOLUME INDEX: 57.89 ML/M2
LEFT VENTRICLE DIASTOLIC VOLUME: 109.42 ML
LEFT VENTRICLE MASS INDEX: 74 G/M2
LEFT VENTRICLE SYSTOLIC VOLUME INDEX: 26.1 ML/M2
LEFT VENTRICLE SYSTOLIC VOLUME: 49.32 ML
LEFT VENTRICULAR INTERNAL DIMENSION IN DIASTOLE: 4.84 CM (ref 3.5–6)
LEFT VENTRICULAR MASS: 140.07 G
LV LATERAL E/E' RATIO: 7.6 M/S
LV SEPTAL E/E' RATIO: 10.86 M/S
LVOT MG: 3.66 MMHG
LVOT MV: 0.9 CM/S
MV PEAK A VEL: 0.87 M/S
MV PEAK E VEL: 0.76 M/S
MV STENOSIS PRESSURE HALF TIME: 47.79 MS
MV VALVE AREA P 1/2 METHOD: 4.6 CM2
PISA TR MAX VEL: 2.35 M/S
PULM VEIN S/D RATIO: 1.98
PV PEAK D VEL: 0.41 M/S
PV PEAK S VEL: 0.81 M/S
RA PRESSURE ESTIMATED: 8 MMHG
RA VOL SYS: 34.11 ML
RIGHT ATRIAL AREA: 14.5 CM2
RV TB RVSP: 10 MMHG
RV TISSUE DOPPLER FREE WALL SYSTOLIC VELOCITY 1 (APICAL 4 CHAMBER VIEW): 13.73 CM/S
SINUS: 3.22 CM
STJ: 2.76 CM
TDI LATERAL: 0.1 M/S
TDI SEPTAL: 0.07 M/S
TDI: 0.09 M/S
TR MAX PG: 22 MMHG
TRICUSPID ANNULAR PLANE SYSTOLIC EXCURSION: 2.02 CM
TV REST PULMONARY ARTERY PRESSURE: 30 MMHG
Z-SCORE OF LEFT VENTRICULAR DIMENSION IN END DIASTOLE: -0.89
Z-SCORE OF LEFT VENTRICULAR DIMENSION IN END SYSTOLE: 0.47

## 2024-06-18 ENCOUNTER — HOSPITAL ENCOUNTER (OUTPATIENT)
Dept: CARDIOLOGY | Facility: HOSPITAL | Age: 64
Discharge: HOME | End: 2024-06-18
Attending: INTERNAL MEDICINE
Payer: MEDICARE

## 2024-06-18 DIAGNOSIS — E78.49 OTHER HYPERLIPIDEMIA: ICD-10-CM

## 2024-06-18 DIAGNOSIS — I73.9 CLAUDICATION: Chronic | ICD-10-CM

## 2024-06-18 DIAGNOSIS — R09.89 ARTERIAL BRUIT: ICD-10-CM

## 2024-06-18 LAB
LEFT ANT TIBIAL SYS PSV: 64 CM/S
LEFT ARM DIASTOLIC BLOOD PRESSURE: 79 MMHG
LEFT ARM SYSTOLIC BLOOD PRESSURE: 126 MMHG
LEFT CBA DIAS: 21 CM/S
LEFT CBA SYS: 71 CM/S
LEFT CCA DIST DIAS: 34 CM/S
LEFT CCA DIST SYS: 104 CM/S
LEFT CCA MID DIAS: 24 CM/S
LEFT CCA MID SYS: 97 CM/S
LEFT CCA PROX DIAS: 27 CM/S
LEFT CCA PROX SYS: 89 CM/S
LEFT CFA PSV: 103 CM/S
LEFT ECA DIAS: 22 CM/S
LEFT ECA SYS: 113 CM/S
LEFT ICA DIST DIAS: 40 CM/S
LEFT ICA DIST SYS: 99 CM/S
LEFT ICA MID DIAS: 33 CM/S
LEFT ICA MID SYS: 99 CM/S
LEFT ICA PROX DIAS: 23 CM/S
LEFT ICA PROX SYS: 74 CM/S
LEFT PERONEAL SYS PSV: 35 CM/S
LEFT POPLITEAL PSV: 58 CM/S
LEFT POST TIBIAL SYS PSV: 104 CM/S
LEFT PROFUNDA SYS PSV: 78 CM/S
LEFT SUPER FEMORAL DIST SYS PSV: 74 CM/S
LEFT SUPER FEMORAL MID SYS PSV: 100 CM/S
LEFT SUPER FEMORAL OSTIAL SYS PSV: 107 CM/S
LEFT SUPER FEMORAL PROX SYS PSV: 100 CM/S
LEFT TIB/PER TRUNK SYS PSV: 93 CM/S
LEFT VERTEBRAL DIAS: 23 CM/S
LEFT VERTEBRAL SYS: 72 CM/S
OHS CV CAROTID RIGHT ICA EDV HIGHEST: 38
OHS CV CAROTID ULTRASOUND LEFT ICA/CCA RATIO: 0.95
OHS CV CAROTID ULTRASOUND RIGHT ICA/CCA RATIO: 1.38
OHS CV LEFT LOWER EXTREMITY ABI (NO CALC): 1.21
OHS CV PV CAROTID LEFT HIGHEST CCA: 104
OHS CV PV CAROTID LEFT HIGHEST ICA: 99
OHS CV PV CAROTID RIGHT HIGHEST CCA: 99
OHS CV PV CAROTID RIGHT HIGHEST ICA: 98
OHS CV RIGHT ABI LOWER EXTREMITY (NO CALC): 1.14
OHS CV US CAROTID LEFT HIGHEST EDV: 40
RIGHT ANT TIBIAL SYS PSV: 101 CM/S
RIGHT ARM DIASTOLIC BLOOD PRESSURE: 79 MMHG
RIGHT ARM SYSTOLIC BLOOD PRESSURE: 126 MMHG
RIGHT CBA DIAS: 21 CM/S
RIGHT CBA SYS: 71 CM/S
RIGHT CCA DIST DIAS: 22 CM/S
RIGHT CCA DIST SYS: 71 CM/S
RIGHT CCA MID DIAS: 25 CM/S
RIGHT CCA MID SYS: 99 CM/S
RIGHT CCA PROX DIAS: 20 CM/S
RIGHT CCA PROX SYS: 83 CM/S
RIGHT CFA PSV: 131 CM/S
RIGHT ECA DIAS: 18 CM/S
RIGHT ECA SYS: 98 CM/S
RIGHT ICA DIST DIAS: 38 CM/S
RIGHT ICA DIST SYS: 95 CM/S
RIGHT ICA MID DIAS: 38 CM/S
RIGHT ICA MID SYS: 98 CM/S
RIGHT ICA PROX DIAS: 30 CM/S
RIGHT ICA PROX SYS: 85 CM/S
RIGHT PERONEAL SYS PSV: 70 CM/S
RIGHT POPLITEAL PSV: 68 CM/S
RIGHT POST TIBIAL SYS PSV: 84 CM/S
RIGHT PROFUNDA SYS PSV: 102 CM/S
RIGHT SUPER FEMORAL DIST SYS PSV: 72 CM/S
RIGHT SUPER FEMORAL MID SYS PSV: 115 CM/S
RIGHT SUPER FEMORAL OSTIAL SYS PSV: 93 CM/S
RIGHT SUPER FEMORAL PROX SYS PSV: 103 CM/S
RIGHT TIB/PER TRUNK SYS PSV: 91 CM/S
RIGHT VERTEBRAL DIAS: 13 CM/S
RIGHT VERTEBRAL SYS: 52 CM/S

## 2024-06-18 PROCEDURE — 93880 EXTRACRANIAL BILAT STUDY: CPT | Mod: PO

## 2024-06-18 PROCEDURE — 93880 EXTRACRANIAL BILAT STUDY: CPT | Mod: 26,,, | Performed by: INTERNAL MEDICINE

## 2024-06-18 PROCEDURE — 93925 LOWER EXTREMITY STUDY: CPT | Mod: PO

## 2024-06-18 RX ORDER — ROSUVASTATIN CALCIUM 5 MG/1
5 TABLET, COATED ORAL NIGHTLY
Qty: 90 TABLET | Refills: 0 | Status: SHIPPED | OUTPATIENT
Start: 2024-06-18

## 2024-06-19 NOTE — TELEPHONE ENCOUNTER
Provider Staff:  Action required for this patient    Requires labs      Please see care gap opportunities below in Care Due Message.    Thanks!  [facility]     Appointments      Date Provider   Last Visit   1/5/2024 Deyvi Vela MD   Next Visit   7/5/2024 Deyvi Vela MD     Refill Decision Note   Charla Patrick  is requesting a refill authorization.  Brief Assessment and Rationale for Refill:  Approve     Medication Therapy Plan:         Comments:     Note composed:10:58 PM 06/18/2024

## 2024-06-20 LAB
LEFT ANT TIBIAL SYS PSV: 64 CM/S
LEFT CFA PSV: 103 CM/S
LEFT PERONEAL SYS PSV: 35 CM/S
LEFT POPLITEAL PSV: 58 CM/S
LEFT POST TIBIAL SYS PSV: 104 CM/S
LEFT PROFUNDA SYS PSV: 78 CM/S
LEFT SUPER FEMORAL DIST SYS PSV: 74 CM/S
LEFT SUPER FEMORAL MID SYS PSV: 100 CM/S
LEFT SUPER FEMORAL OSTIAL SYS PSV: 107 CM/S
LEFT SUPER FEMORAL PROX SYS PSV: 100 CM/S
LEFT TIB/PER TRUNK SYS PSV: 93 CM/S
OHS CV LEFT LOWER EXTREMITY ABI (NO CALC): 1.21
OHS CV RIGHT ABI LOWER EXTREMITY (NO CALC): 1.14
RIGHT ANT TIBIAL SYS PSV: 101 CM/S
RIGHT CFA PSV: 131 CM/S
RIGHT PERONEAL SYS PSV: 70 CM/S
RIGHT POPLITEAL PSV: 68 CM/S
RIGHT POST TIBIAL SYS PSV: 84 CM/S
RIGHT PROFUNDA SYS PSV: 102 CM/S
RIGHT SUPER FEMORAL DIST SYS PSV: 72 CM/S
RIGHT SUPER FEMORAL MID SYS PSV: 115 CM/S
RIGHT SUPER FEMORAL OSTIAL SYS PSV: 93 CM/S
RIGHT SUPER FEMORAL PROX SYS PSV: 103 CM/S
RIGHT TIB/PER TRUNK SYS PSV: 91 CM/S

## 2024-06-21 ENCOUNTER — PATIENT MESSAGE (OUTPATIENT)
Dept: CARDIOLOGY | Facility: CLINIC | Age: 64
End: 2024-06-21
Payer: MEDICARE

## 2024-06-23 ENCOUNTER — PATIENT MESSAGE (OUTPATIENT)
Dept: FAMILY MEDICINE | Facility: CLINIC | Age: 64
End: 2024-06-23
Payer: MEDICARE

## 2024-06-28 ENCOUNTER — PATIENT MESSAGE (OUTPATIENT)
Dept: FAMILY MEDICINE | Facility: CLINIC | Age: 64
End: 2024-06-28
Payer: MEDICARE

## 2024-06-28 ENCOUNTER — TELEPHONE (OUTPATIENT)
Dept: FAMILY MEDICINE | Facility: CLINIC | Age: 64
End: 2024-06-28
Payer: MEDICARE

## 2024-06-28 NOTE — TELEPHONE ENCOUNTER
Spoke with patient in regards to rescheduling her 7/5 appointment. Patient was scheduled for 7/17 at 11:00 am with Dr. Vela in person. Time slot is held.

## 2024-06-30 ENCOUNTER — PATIENT MESSAGE (OUTPATIENT)
Dept: RHEUMATOLOGY | Facility: CLINIC | Age: 64
End: 2024-06-30
Payer: MEDICARE

## 2024-07-01 ENCOUNTER — PATIENT MESSAGE (OUTPATIENT)
Dept: FAMILY MEDICINE | Facility: CLINIC | Age: 64
End: 2024-07-01
Payer: MEDICARE

## 2024-07-01 DIAGNOSIS — E11.9 TYPE 2 DIABETES MELLITUS WITHOUT COMPLICATION, WITHOUT LONG-TERM CURRENT USE OF INSULIN: Primary | ICD-10-CM

## 2024-07-01 NOTE — TELEPHONE ENCOUNTER
Ozempic is listed as an active medication in chart.  Pt is needing mounjaro 5mg.  Mounjaro 7.5 has been discontinued from her chart

## 2024-07-03 RX ORDER — TIRZEPATIDE 5 MG/.5ML
5 INJECTION, SOLUTION SUBCUTANEOUS
Qty: 12 PEN | Refills: 3 | Status: SHIPPED | OUTPATIENT
Start: 2024-07-03

## 2024-07-05 ENCOUNTER — PATIENT OUTREACH (OUTPATIENT)
Dept: RHEUMATOLOGY | Facility: CLINIC | Age: 64
End: 2024-07-05
Payer: MEDICARE

## 2024-07-05 DIAGNOSIS — L40.9 SCALP PSORIASIS: ICD-10-CM

## 2024-07-05 DIAGNOSIS — L40.50 PSORIATIC ARTHRITIS: Primary | ICD-10-CM

## 2024-07-05 RX ORDER — USTEKINUMAB 90 MG/ML
90 INJECTION, SOLUTION SUBCUTANEOUS
Qty: 1 ML | Refills: 4 | Status: SHIPPED | OUTPATIENT
Start: 2024-07-05

## 2024-07-05 NOTE — PROGRESS NOTES
"Patient email: request for treatment changes and medical decision making.   "I am currently taking Stelara which is good for my joints, with the exception of right hip and left hand of course. The problem I'm having is my scalp is broken out and has been for a while. I did see a dermatologist a while back who gave me an antibiotic and shampoo. My scalp cleared with the antibiotics but returned as soon as the course was done. Shampoo nev r worked.   I've never had a break out with any of the other drugs I've taken so, before I scratch all of my hair out, I'm hoping I could change medications before I reorder the next round of meds.   LISI, can't see another dermatologist in this area until January 2025!  Please advise  Thanks"    Current meds:   Stelara 45mg Q12 weeks. (Start 9/2022)      Plan:   Increase Stelara to 69dmN15 weeks.     "

## 2024-07-09 ENCOUNTER — TELEPHONE (OUTPATIENT)
Dept: RHEUMATOLOGY | Facility: CLINIC | Age: 64
End: 2024-07-09

## 2024-07-09 DIAGNOSIS — Z79.899 IMMUNOSUPPRESSION DUE TO DRUG THERAPY: Primary | ICD-10-CM

## 2024-07-09 DIAGNOSIS — D84.821 IMMUNOSUPPRESSION DUE TO DRUG THERAPY: Primary | ICD-10-CM

## 2024-07-09 NOTE — TELEPHONE ENCOUNTER
----- Message from Cassidy Vogel LPN sent at 7/9/2024  2:53 PM CDT -----  Regarding: FW: Stelata / TB Test  Pharmacy asking for TB to be done at next lab visit. Please order and I will link.  ----- Message -----  From: Rod Antonio, Hanna  Sent: 7/9/2024  12:18 PM CDT  To: Winnie French DO; Manolo SEGUNDO Staff  Subject: Stelata / TB Test                                Good afternoon,     We received refills for the patient's Stelara with dosage increase to 90mg. Upon clinical review, I could not locate patient's last TB test. Would it be appropriate for her to complete updated TB test with the next set of scheduled labs?     Thank you,     Rod Antonio, PharmD   Clinical Pharmacist   [facility]   P: [phone]

## 2024-07-17 ENCOUNTER — OFFICE VISIT (OUTPATIENT)
Dept: FAMILY MEDICINE | Facility: CLINIC | Age: 64
End: 2024-07-17
Payer: MEDICARE

## 2024-07-17 ENCOUNTER — HOSPITAL ENCOUNTER (OUTPATIENT)
Dept: RADIOLOGY | Facility: HOSPITAL | Age: 64
Discharge: HOME | End: 2024-07-17
Attending: INTERNAL MEDICINE
Payer: MEDICARE

## 2024-07-17 VITALS
SYSTOLIC BLOOD PRESSURE: 122 MMHG | BODY MASS INDEX: 29.82 KG/M2 | HEIGHT: 65 IN | WEIGHT: 179 LBS | HEART RATE: 73 BPM | OXYGEN SATURATION: 97 % | DIASTOLIC BLOOD PRESSURE: 66 MMHG

## 2024-07-17 DIAGNOSIS — Z78.0 MENOPAUSE: ICD-10-CM

## 2024-07-17 DIAGNOSIS — Z00.00 WELLNESS EXAMINATION: Primary | ICD-10-CM

## 2024-07-17 DIAGNOSIS — E11.9 TYPE 2 DIABETES MELLITUS WITHOUT COMPLICATION, WITHOUT LONG-TERM CURRENT USE OF INSULIN: ICD-10-CM

## 2024-07-17 DIAGNOSIS — M79.671 RIGHT FOOT PAIN: ICD-10-CM

## 2024-07-17 DIAGNOSIS — J30.1 SEASONAL ALLERGIC RHINITIS DUE TO POLLEN: ICD-10-CM

## 2024-07-17 DIAGNOSIS — Z12.31 BREAST CANCER SCREENING BY MAMMOGRAM: ICD-10-CM

## 2024-07-17 DIAGNOSIS — L40.50 PSORIATIC ARTHRITIS: ICD-10-CM

## 2024-07-17 DIAGNOSIS — K76.89 BENIGN LIVER CYST: ICD-10-CM

## 2024-07-17 DIAGNOSIS — E78.49 OTHER HYPERLIPIDEMIA: ICD-10-CM

## 2024-07-17 DIAGNOSIS — K76.0 FATTY LIVER: ICD-10-CM

## 2024-07-17 PROBLEM — E66.811 CLASS 1 OBESITY DUE TO EXCESS CALORIES WITH SERIOUS COMORBIDITY AND BODY MASS INDEX (BMI) OF 31.0 TO 31.9 IN ADULT: Status: RESOLVED | Noted: 2023-01-18 | Resolved: 2024-07-17

## 2024-07-17 PROBLEM — R07.89 CHEST PRESSURE: Status: RESOLVED | Noted: 2024-05-17 | Resolved: 2024-07-17

## 2024-07-17 PROBLEM — E66.3 OVERWEIGHT (BMI 25.0-29.9): Status: RESOLVED | Noted: 2024-05-17 | Resolved: 2024-07-17

## 2024-07-17 PROBLEM — E66.09 CLASS 1 OBESITY DUE TO EXCESS CALORIES WITH SERIOUS COMORBIDITY AND BODY MASS INDEX (BMI) OF 31.0 TO 31.9 IN ADULT: Status: RESOLVED | Noted: 2023-01-18 | Resolved: 2024-07-17

## 2024-07-17 PROBLEM — R06.02 SOB (SHORTNESS OF BREATH): Status: RESOLVED | Noted: 2024-05-17 | Resolved: 2024-07-17

## 2024-07-17 PROCEDURE — 3288F FALL RISK ASSESSMENT DOCD: CPT | Mod: CPTII,S$GLB,, | Performed by: INTERNAL MEDICINE

## 2024-07-17 PROCEDURE — 1159F MED LIST DOCD IN RCRD: CPT | Mod: CPTII,S$GLB,, | Performed by: INTERNAL MEDICINE

## 2024-07-17 PROCEDURE — 99214 OFFICE O/P EST MOD 30 MIN: CPT | Mod: S$GLB,,, | Performed by: INTERNAL MEDICINE

## 2024-07-17 PROCEDURE — 1160F RVW MEDS BY RX/DR IN RCRD: CPT | Mod: CPTII,S$GLB,, | Performed by: INTERNAL MEDICINE

## 2024-07-17 PROCEDURE — 3044F HG A1C LEVEL LT 7.0%: CPT | Mod: CPTII,S$GLB,, | Performed by: INTERNAL MEDICINE

## 2024-07-17 PROCEDURE — G2211 COMPLEX E/M VISIT ADD ON: HCPCS | Mod: S$GLB,,, | Performed by: INTERNAL MEDICINE

## 2024-07-17 PROCEDURE — 3008F BODY MASS INDEX DOCD: CPT | Mod: CPTII,S$GLB,, | Performed by: INTERNAL MEDICINE

## 2024-07-17 PROCEDURE — 73630 X-RAY EXAM OF FOOT: CPT | Mod: 26,RT,, | Performed by: RADIOLOGY

## 2024-07-17 PROCEDURE — 3078F DIAST BP <80 MM HG: CPT | Mod: CPTII,S$GLB,, | Performed by: INTERNAL MEDICINE

## 2024-07-17 PROCEDURE — 73630 X-RAY EXAM OF FOOT: CPT | Mod: TC,FY,PO,RT

## 2024-07-17 PROCEDURE — 1101F PT FALLS ASSESS-DOCD LE1/YR: CPT | Mod: CPTII,S$GLB,, | Performed by: INTERNAL MEDICINE

## 2024-07-17 PROCEDURE — 99999 PR PBB SHADOW E&M-EST. PATIENT-LVL IV: CPT | Mod: PBBFAC,,, | Performed by: INTERNAL MEDICINE

## 2024-07-17 PROCEDURE — 3074F SYST BP LT 130 MM HG: CPT | Mod: CPTII,S$GLB,, | Performed by: INTERNAL MEDICINE

## 2024-07-17 RX ORDER — FLUTICASONE PROPIONATE 50 MCG
1 SPRAY, SUSPENSION (ML) NASAL DAILY
Qty: 16 G | Refills: 3 | Status: SHIPPED | OUTPATIENT
Start: 2024-07-17 | End: 2025-07-17

## 2024-07-17 RX ORDER — CETIRIZINE HYDROCHLORIDE 10 MG/1
10 TABLET, CHEWABLE ORAL DAILY
Qty: 90 TABLET | Refills: 3 | Status: SHIPPED | OUTPATIENT
Start: 2024-07-17 | End: 2025-07-17

## 2024-07-17 NOTE — PROGRESS NOTES
[facility]  Primary Care   [address] [facility] [address]       Patient ID: Charla Patrick     Chief Complaint:   Chief Complaint   Patient presents with    Follow-up     6 month           HPI:  Annual exam and overall she is doing well.  Since our last office visit, she did get a CT scan of her chest which shows some coronary calcifications and stable pulmonary nodules and a liver cyst.  We will repeat a CT of her chest and he year.  She did see Cardiology for the calcifications and thankfully the stress test was normal as was her carotid ultrasound.  MRI showed the liver cyst could be something worry about but thankfully subsequent biopsy prove that it was benign.  She does not have fatty liver and we will monitor that through time.  Vital signs look very good.  She is due for some blood work to monitor her diabetes and cholesterol.  She is also due for diabetic eye exam and I will make an appointment with her ophthalmologist.  Mammogram and bone density scan are due so we will schedule those in the same day.  She does complain about allergy symptoms despite Claritin.  I am going to switch her to Zyrtec and add Flonase but I would like her to use over-the-counter nasal saline prior to the Flonase.  We did find out that she is chronically using over-the-counter oxymetazoline.  I would like her to stop using that but I want her that the 1st week or so he is going to be very difficult as she will likely have rebound nasal congestion.  Over time though oxymetazoline is not good.  She does have plantar fasciitis and did have surgery on it on her right foot and for the past few months she has a very sharp pain stemming from the incision site up into her ankle.  Sure exactly what is going on to cause that pain, so I do want her to see her podiatrist to give him but I also want to check an x-ray to make sure she does not have a fracture.    Review of Systems       Nasal congestion   Right foot  "pain    Objective:      Physical Exam   Physical Exam       Normal     Protective Sensation (w/ 10 gram monofilament):  Right: Intact  Left: Intact    Visual Inspection:  Normal -  Bilateral    Pedal Pulses:   Right: Present  Left: Present    Posterior Tibialis Pulses:   Right:Present  Left: Present    Vitals:   Vitals:    07/17/24 1039   BP: 122/66   Pulse: 73   SpO2: 97%   Weight: 81.2 kg (179 lb 0.2 oz)   Height: 5' 5" (1.651 m)        Assessment:           Plan:       Charla Patrick  was seen today for follow-up and may need lab work.    Diagnoses and all orders for this visit:    Charla was seen today for follow-up.    Diagnoses and all orders for this visit:    Wellness examination    Seasonal allergic rhinitis due to pollen  -     cetirizine 10 mg chewable tablet; Take 10 mg by mouth once daily.  -     fluticasone propionate (FLONASE) 50 mcg/actuation nasal spray; 1 spray (50 mcg total) by Each Nostril route once daily.  Oxymetazoline and transitioned to Flonase and Zyrtec    Right foot pain  -     X-Ray Foot Complete Right; Future    Other hyperlipidemia  -     Lipid Panel; Future  Monitor on rosuvastatin    Fatty liver  Please lower the red meat, pork, egg yolk and shrimp in your diet.     Psoriatic arthritis  Plan per Rheumatology    Type 2 diabetes mellitus without complication, without long-term current use of insulin  -     Microalbumin/Creatinine Ratio, Urine; Future  -     Hemoglobin A1C; Future  Overall controlled, but monitor labs    Benign liver cyst  Biopsy-proven it was benign    Breast cancer screening by mammogram  -     Mammo Digital Screening Bilat w/ Leobardo; Future    Menopause  -     DXA Bone Density Axial Skeleton 1 or more sites; Future       Visit to diabetes hyperlipidemia right foot pain fatty liver day included increased complexity associated with the care of the episodic problem  addressed and managing the longitudinal care of the patient due to the serious and/or complex managed " problem(s) .      Deyvi Vela MD

## 2024-07-24 ENCOUNTER — PATIENT MESSAGE (OUTPATIENT)
Dept: FAMILY MEDICINE | Facility: CLINIC | Age: 64
End: 2024-07-24
Payer: MEDICARE

## 2024-07-24 DIAGNOSIS — J30.1 NON-SEASONAL ALLERGIC RHINITIS DUE TO POLLEN: Primary | ICD-10-CM

## 2024-07-24 RX ORDER — CETIRIZINE HYDROCHLORIDE 10 MG/1
10 TABLET ORAL DAILY
Qty: 90 TABLET | Refills: 3 | Status: SHIPPED | OUTPATIENT
Start: 2024-07-24 | End: 2025-07-24

## 2024-07-25 ENCOUNTER — PATIENT MESSAGE (OUTPATIENT)
Dept: FAMILY MEDICINE | Facility: CLINIC | Age: 64
End: 2024-07-25
Payer: MEDICARE

## 2024-07-25 DIAGNOSIS — M79.671 RIGHT FOOT PAIN: Primary | ICD-10-CM

## 2024-08-07 ENCOUNTER — HOSPITAL ENCOUNTER (OUTPATIENT)
Dept: RADIOLOGY | Facility: HOSPITAL | Age: 64
Discharge: HOME | End: 2024-08-07
Attending: INTERNAL MEDICINE
Payer: MEDICARE

## 2024-08-07 DIAGNOSIS — Z12.31 BREAST CANCER SCREENING BY MAMMOGRAM: ICD-10-CM

## 2024-08-07 DIAGNOSIS — Z78.0 MENOPAUSE: ICD-10-CM

## 2024-08-07 PROCEDURE — 77063 BREAST TOMOSYNTHESIS BI: CPT | Mod: TC,PO

## 2024-08-07 PROCEDURE — 77080 DXA BONE DENSITY AXIAL: CPT | Mod: TC,PO

## 2024-08-07 PROCEDURE — 77091 TBS TECHL CALCULATION ONLY: CPT | Mod: PO

## 2024-08-07 PROCEDURE — 77067 SCR MAMMO BI INCL CAD: CPT | Mod: TC,PO

## 2024-08-11 ENCOUNTER — PATIENT MESSAGE (OUTPATIENT)
Dept: FAMILY MEDICINE | Facility: CLINIC | Age: 64
End: 2024-08-11
Payer: MEDICARE

## 2024-08-11 DIAGNOSIS — M85.851 OSTEOPENIA OF BOTH HIPS: Primary | ICD-10-CM

## 2024-08-11 DIAGNOSIS — M85.852 OSTEOPENIA OF BOTH HIPS: Primary | ICD-10-CM

## 2024-08-12 ENCOUNTER — PATIENT MESSAGE (OUTPATIENT)
Dept: FAMILY MEDICINE | Facility: CLINIC | Age: 64
End: 2024-08-12
Payer: MEDICARE

## 2024-08-12 RX ORDER — ALENDRONATE SODIUM 70 MG/1
70 TABLET ORAL
Qty: 12 TABLET | Refills: 3 | Status: SHIPPED | OUTPATIENT
Start: 2024-08-12 | End: 2025-08-12

## 2024-08-19 ENCOUNTER — LAB VISIT (OUTPATIENT)
Dept: LAB | Facility: HOSPITAL | Age: 64
End: 2024-08-19
Attending: INTERNAL MEDICINE
Payer: MEDICARE

## 2024-08-19 DIAGNOSIS — L40.9 SCALP PSORIASIS: ICD-10-CM

## 2024-08-19 DIAGNOSIS — Z79.899 IMMUNOSUPPRESSION DUE TO DRUG THERAPY: ICD-10-CM

## 2024-08-19 DIAGNOSIS — L40.50 PSORIATIC ARTHRITIS: ICD-10-CM

## 2024-08-19 DIAGNOSIS — Z79.899 HIGH RISK MEDICATIONS (NOT ANTICOAGULANTS) LONG-TERM USE: ICD-10-CM

## 2024-08-19 DIAGNOSIS — D84.821 IMMUNOSUPPRESSION DUE TO DRUG THERAPY: ICD-10-CM

## 2024-08-19 LAB
ALBUMIN SERPL BCP-MCNC: 3.9 G/DL (ref 3.5–5.2)
ALP SERPL-CCNC: 101 U/L (ref 55–135)
ALT SERPL W/O P-5'-P-CCNC: 37 U/L (ref 10–44)
ANION GAP SERPL CALC-SCNC: 6 MMOL/L (ref 8–16)
AST SERPL-CCNC: 31 U/L (ref 10–40)
BASOPHILS # BLD AUTO: 0.05 K/UL (ref 0–0.2)
BASOPHILS NFR BLD: 0.9 % (ref 0–1.9)
BILIRUB SERPL-MCNC: 0.5 MG/DL (ref 0.1–1)
BUN SERPL-MCNC: 15 MG/DL (ref 8–23)
CALCIUM SERPL-MCNC: 9.9 MG/DL (ref 8.7–10.5)
CHLORIDE SERPL-SCNC: 109 MMOL/L (ref 95–110)
CO2 SERPL-SCNC: 25 MMOL/L (ref 23–29)
CREAT SERPL-MCNC: 1.1 MG/DL (ref 0.5–1.4)
CRP SERPL-MCNC: 3.9 MG/L (ref 0–8.2)
DIFFERENTIAL METHOD BLD: NORMAL
EOSINOPHIL # BLD AUTO: 0.1 K/UL (ref 0–0.5)
EOSINOPHIL NFR BLD: 2.2 % (ref 0–8)
ERYTHROCYTE [DISTWIDTH] IN BLOOD BY AUTOMATED COUNT: 14.2 % (ref 11.5–14.5)
ERYTHROCYTE [SEDIMENTATION RATE] IN BLOOD BY PHOTOMETRIC METHOD: 12 MM/HR (ref 0–36)
EST. GFR  (NO RACE VARIABLE): 55.8 ML/MIN/1.73 M^2
GLUCOSE SERPL-MCNC: 118 MG/DL (ref 70–110)
HCT VFR BLD AUTO: 42.3 % (ref 37–48.5)
HGB BLD-MCNC: 13.8 G/DL (ref 12–16)
IMM GRANULOCYTES # BLD AUTO: 0.01 K/UL (ref 0–0.04)
IMM GRANULOCYTES NFR BLD AUTO: 0.2 % (ref 0–0.5)
LYMPHOCYTES # BLD AUTO: 1.6 K/UL (ref 1–4.8)
LYMPHOCYTES NFR BLD: 28.9 % (ref 18–48)
MCH RBC QN AUTO: 29.7 PG (ref 27–31)
MCHC RBC AUTO-ENTMCNC: 32.6 G/DL (ref 32–36)
MCV RBC AUTO: 91 FL (ref 82–98)
MONOCYTES # BLD AUTO: 0.5 K/UL (ref 0.3–1)
MONOCYTES NFR BLD: 8.2 % (ref 4–15)
NEUTROPHILS # BLD AUTO: 3.3 K/UL (ref 1.8–7.7)
NEUTROPHILS NFR BLD: 59.6 % (ref 38–73)
NRBC BLD-RTO: 0 /100 WBC
PLATELET # BLD AUTO: 203 K/UL (ref 150–450)
PMV BLD AUTO: 10.6 FL (ref 9.2–12.9)
POTASSIUM SERPL-SCNC: 4.4 MMOL/L (ref 3.5–5.1)
PROT SERPL-MCNC: 6.9 G/DL (ref 6–8.4)
RBC # BLD AUTO: 4.64 M/UL (ref 4–5.4)
SODIUM SERPL-SCNC: 140 MMOL/L (ref 136–145)
WBC # BLD AUTO: 5.51 K/UL (ref 3.9–12.7)

## 2024-08-19 PROCEDURE — 86140 C-REACTIVE PROTEIN: CPT | Performed by: INTERNAL MEDICINE

## 2024-08-19 PROCEDURE — 86480 TB TEST CELL IMMUN MEASURE: CPT | Performed by: INTERNAL MEDICINE

## 2024-08-19 PROCEDURE — 85025 COMPLETE CBC W/AUTO DIFF WBC: CPT | Performed by: INTERNAL MEDICINE

## 2024-08-19 PROCEDURE — 36415 COLL VENOUS BLD VENIPUNCTURE: CPT | Mod: PO | Performed by: INTERNAL MEDICINE

## 2024-08-19 PROCEDURE — 80053 COMPREHEN METABOLIC PANEL: CPT | Performed by: INTERNAL MEDICINE

## 2024-08-19 PROCEDURE — 85652 RBC SED RATE AUTOMATED: CPT | Performed by: INTERNAL MEDICINE

## 2024-08-21 LAB
GAMMA INTERFERON BACKGROUND BLD IA-ACNC: 0 IU/ML
M TB IFN-G CD4+ BCKGRND COR BLD-ACNC: 0 IU/ML
M TB IFN-G CD4+ BCKGRND COR BLD-ACNC: 0 IU/ML
MITOGEN IGNF BCKGRD COR BLD-ACNC: 10 IU/ML
TB GOLD PLUS: NEGATIVE

## 2024-08-22 ENCOUNTER — PATIENT MESSAGE (OUTPATIENT)
Dept: FAMILY MEDICINE | Facility: CLINIC | Age: 64
End: 2024-08-22
Payer: MEDICARE

## 2024-08-27 ENCOUNTER — PATIENT MESSAGE (OUTPATIENT)
Dept: FAMILY MEDICINE | Facility: CLINIC | Age: 64
End: 2024-08-27
Payer: MEDICARE

## 2024-08-27 DIAGNOSIS — H90.0 CONDUCTIVE HEARING LOSS, BILATERAL: Primary | ICD-10-CM

## 2024-09-04 DIAGNOSIS — E78.49 OTHER HYPERLIPIDEMIA: ICD-10-CM

## 2024-09-04 NOTE — TELEPHONE ENCOUNTER
Care Due:                  Date            Visit Type   Department     Provider  --------------------------------------------------------------------------------                                EP -                              [facility]  Last Visit: 07-      CARE ([facility])   DHIRAJ Vela                               -                              [facility]  Next Visit: 01-      CARE ([facility])   MEDICINE       Deyvi Vela                                                            Last  Test          Frequency    Reason                     Performed    Due Date  --------------------------------------------------------------------------------    Mg Level....  12 months..  alendronate..............  Not Found    Overdue    Phosphate...  12 months..  alendronate..............  Not Found    Overdue    [employer] Embedded Care Due Messages. Reference number: [id number].   9/04/2024 6:08:23 PM CDT

## 2024-09-05 RX ORDER — ROSUVASTATIN CALCIUM 5 MG/1
5 TABLET, COATED ORAL NIGHTLY
Qty: 90 TABLET | Refills: 3 | Status: SHIPPED | OUTPATIENT
Start: 2024-09-05

## 2024-09-06 NOTE — TELEPHONE ENCOUNTER
Refill Decision Note   Charla Celina  is requesting a refill authorization.  Brief Assessment and Rationale for Refill:  Approve     Medication Therapy Plan:         Comments:     Note composed:7:22 PM 09/05/2024

## 2024-09-10 ENCOUNTER — PATIENT MESSAGE (OUTPATIENT)
Dept: ADMINISTRATIVE | Facility: HOSPITAL | Age: 64
End: 2024-09-10
Payer: MEDICARE

## 2024-09-16 ENCOUNTER — PATIENT MESSAGE (OUTPATIENT)
Dept: FAMILY MEDICINE | Facility: CLINIC | Age: 64
End: 2024-09-16
Payer: MEDICARE

## 2024-09-16 DIAGNOSIS — E11.9 TYPE 2 DIABETES MELLITUS WITHOUT COMPLICATION, WITHOUT LONG-TERM CURRENT USE OF INSULIN: ICD-10-CM

## 2024-09-16 DIAGNOSIS — L70.0 ACNE VULGARIS: Primary | ICD-10-CM

## 2024-09-16 RX ORDER — TIRZEPATIDE 5 MG/.5ML
5 INJECTION, SOLUTION SUBCUTANEOUS
Qty: 12 PEN | Refills: 3 | Status: CANCELLED | OUTPATIENT
Start: 2024-09-16

## 2024-09-16 NOTE — TELEPHONE ENCOUNTER
No care due was identified.  [facility] Embedded Care Due Messages. Reference number: [id number].   9/16/2024 11:40:10 AM CDT

## 2024-09-16 NOTE — TELEPHONE ENCOUNTER
No care due was identified.  [facility] Embedded Care Due Messages. Reference number: [id number].   9/16/2024 11:51:24 AM CDT

## 2024-09-17 RX ORDER — TIRZEPATIDE 7.5 MG/.5ML
INJECTION, SOLUTION SUBCUTANEOUS
Refills: 0 | OUTPATIENT
Start: 2024-09-17

## 2024-09-17 RX ORDER — SULFACETAMIDE SODIUM, SULFUR 100; 50 MG/G; MG/G
EMULSION TOPICAL
Qty: 340 G | Refills: 11 | Status: SHIPPED | OUTPATIENT
Start: 2024-09-17

## 2024-09-17 NOTE — TELEPHONE ENCOUNTER
[facility] Note  Quick DC. Inappropriate Request   Refill request requires further review by MD: NO   Medication Therapy Plan: Pharmacy is requesting new script(s) for the following medications without required information, (sig/ frequency/qty/etc)     [facility] action(s):  Quick Discontinue      Duplicate Pended Encounter(s)/ Last Prescribed Details:    Pharmacies have been requesting medications for patients without required information, (sig, frequency, qty, etc.). In addition, requests are sent for medication(s) pt. are currently not taking, and medications patients have never taken.    We have spoken to the pharmacies about these request types and advised their teams previously that we are unable to assess these New Script requests and require all details for these requests. This is a known issue and has been reported.        Medication related problems are not assessed for QDC.   Medication Reconciliation Completed? NO Were there pending details that required adjustment? NO     Automatic Epic Generated Protocol Data Below:   Requested Prescriptions   Pending Prescriptions Disp Refills    MOUNJARO 7.5 mg/0.5 mL PnIj [Pharmacy Med Name: MOUNJARO PEN 0.5ML 4'S 7.5MG]  0              Appointments      Date Provider   Last Visit   7/17/2024 Deyvi Vela MD   Next Visit   1/22/2025 Deyvi Vela MD        Note composed:4:24 PM 09/17/2024

## 2024-09-19 ENCOUNTER — OFFICE VISIT (OUTPATIENT)
Dept: RHEUMATOLOGY | Facility: CLINIC | Age: 64
End: 2024-09-19
Payer: MEDICARE

## 2024-09-19 VITALS
WEIGHT: 181.88 LBS | BODY MASS INDEX: 30.3 KG/M2 | HEIGHT: 65 IN | HEART RATE: 74 BPM | SYSTOLIC BLOOD PRESSURE: 142 MMHG | DIASTOLIC BLOOD PRESSURE: 78 MMHG

## 2024-09-19 DIAGNOSIS — Z79.899 HIGH RISK MEDICATIONS (NOT ANTICOAGULANTS) LONG-TERM USE: ICD-10-CM

## 2024-09-19 DIAGNOSIS — Z79.899 IMMUNOSUPPRESSION DUE TO DRUG THERAPY: ICD-10-CM

## 2024-09-19 DIAGNOSIS — D84.821 IMMUNOSUPPRESSION DUE TO DRUG THERAPY: ICD-10-CM

## 2024-09-19 DIAGNOSIS — L40.50 PSORIATIC ARTHRITIS: Primary | ICD-10-CM

## 2024-09-19 PROCEDURE — 3066F NEPHROPATHY DOC TX: CPT | Mod: CPTII,S$GLB,, | Performed by: INTERNAL MEDICINE

## 2024-09-19 PROCEDURE — 3061F NEG MICROALBUMINURIA REV: CPT | Mod: CPTII,S$GLB,, | Performed by: INTERNAL MEDICINE

## 2024-09-19 PROCEDURE — 1101F PT FALLS ASSESS-DOCD LE1/YR: CPT | Mod: CPTII,S$GLB,, | Performed by: INTERNAL MEDICINE

## 2024-09-19 PROCEDURE — 1160F RVW MEDS BY RX/DR IN RCRD: CPT | Mod: CPTII,S$GLB,, | Performed by: INTERNAL MEDICINE

## 2024-09-19 PROCEDURE — 99999 PR PBB SHADOW E&M-EST. PATIENT-LVL IV: CPT | Mod: PBBFAC,,, | Performed by: INTERNAL MEDICINE

## 2024-09-19 PROCEDURE — 3044F HG A1C LEVEL LT 7.0%: CPT | Mod: CPTII,S$GLB,, | Performed by: INTERNAL MEDICINE

## 2024-09-19 PROCEDURE — 3078F DIAST BP <80 MM HG: CPT | Mod: CPTII,S$GLB,, | Performed by: INTERNAL MEDICINE

## 2024-09-19 PROCEDURE — 3008F BODY MASS INDEX DOCD: CPT | Mod: CPTII,S$GLB,, | Performed by: INTERNAL MEDICINE

## 2024-09-19 PROCEDURE — 1159F MED LIST DOCD IN RCRD: CPT | Mod: CPTII,S$GLB,, | Performed by: INTERNAL MEDICINE

## 2024-09-19 PROCEDURE — 99215 OFFICE O/P EST HI 40 MIN: CPT | Mod: S$GLB,,, | Performed by: INTERNAL MEDICINE

## 2024-09-19 PROCEDURE — 3288F FALL RISK ASSESSMENT DOCD: CPT | Mod: CPTII,S$GLB,, | Performed by: INTERNAL MEDICINE

## 2024-09-19 PROCEDURE — 3077F SYST BP >= 140 MM HG: CPT | Mod: CPTII,S$GLB,, | Performed by: INTERNAL MEDICINE

## 2024-09-19 ASSESSMENT — ROUTINE ASSESSMENT OF PATIENT INDEX DATA (RAPID3)
PSYCHOLOGICAL DISTRESS SCORE: 0
PATIENT GLOBAL ASSESSMENT SCORE: 3.5
MDHAQ FUNCTION SCORE: 0.5
PAIN SCORE: 3.5
TOTAL RAPID3 SCORE: 2.89
AM STIFFNESS SCORE: 1, YES
FATIGUE SCORE: 2.2

## 2024-09-19 NOTE — PROGRESS NOTES
Subjective:          Chief Complaint: Charla Patrick is a 64 y.o. female who had concerns including Disease Management.    HPI:  Patient is a 64-year-old female with psoriatic arthritis   Diagnosed approx 2006 - acute psoriasis.   Joints began shortly after the skin, joint pain was located in the fingers, hips, and both feet (chronic plantar fasciitis) .      Aggravating factors with the morning evening and during activity.    Alleviating factors were exercise and medications associated factors joint stiffness, fatigue, lack of sleep, joint pain, joint swelling, abdominal pain and night sweats.      She also notes dry eyes and dry mouth psoriasis family history of other autoimmune conditions.    Patient denies ulcerative colitis, crohns or other colitis. Only IBS to date.   Previous issues with continued GI complaints and c/o depression more difficult to manage with Otezla so discontinued.   Small recurrent scalp lesion is most prevalent historically.   Started Cosentyx (8/2021-8/2022)- despite titration to 300mg q month still with progressive arthritis.   Stelara started 9/2022- present.     Left elbow with injury still hurting.   Right lateral hip waking with pain. Typically it will stop or be brief now seems present every morning for over 30min.   Rates all this pain 2/10 so when comparing to previous visit. Seems to be doing well with Stelara.   Morning stiffness: 15-20min but ++gelling phenomena.   Multi-Dimensional Health Assessment 8/25/2022 12/19/2022 4/19/2023   MHAQ Score 0.7 0.4 0.4   Psychologic Score 1.1 2.2 1.1   Pain Score 6 4 2   Fatigue Score 2.2 1.1 0   Global Health Score 6 3 1   RAPID3 Score 4.78 2.78 1.44     8/2023:  2.33   12/2023: 2.11 global 2.0    9/2024: Rapid 3: 2.78  Right lateral hip is waking at night and painful. No groin pain. Minimal back pain. Known hx of SIJ issues and at times can radiate. We did inject right GTB in 2022 limited benefit. Never had SIJ injection  Left lateral  epicondyle pulling a cooler, completed HEP/PT and doing well. More recent right lateral epicondyle flared few weeks ago. She does have associated     Current medication:  Stelara 45mg Q 12 w (start 9/2022)   MTX 4 tabs weekly  Folic acid 1 mg daily  tizandine 4mg tablets.   Tylenol PRN    Previous medications trialed:    Methotrexate began 2007.  Discontinued methotrexate 05/2017 no ASE or LAE.   Enbrel without adverse side effects no mention exact type.  Humira was loss of back efficacy over time,  Cimzia no benefit.   Otezla 30 mg once daily (6775-7539)- losing efficacy  Celebrex but developed a  gastritis despite this.  Cosentyx 300mg monthly (DC 9/2022) loss of efficacy  No personal hx of Cancer  No DVT/seizure or stroke, startin estradiol     Patient also has osteopenia her last DEXA 2018 was osteopenia but her FRAX score was below recommended bisphosphonate or other therapeutic ranges so she was continued on regular weight-bearing calcium and vitamin-D and repeat DXA is every 2 years    [facility] 7 10/23/2019 ultrasound bilateral hands  Left hand visualized portions of the bone muscle tendon joints and median nerve are normal appearing particular evaluation at the MCP 2 3 and 5 transverse views also obtained no evidence of any erosions power Doppler use for synovitis and tenosynovitis which was also negative.  I have a TB from 03/23/2020 as a QuantiFERON gold that is negative      REVIEW OF SYSTEMS:    Review of Systems   Constitutional:  Negative for fever, malaise/fatigue and weight loss.   HENT:  Negative for sore throat.    Eyes:  Negative for double vision, photophobia and redness.   Respiratory:  Negative for cough, shortness of breath and wheezing.    Cardiovascular:  Negative for chest pain, palpitations and orthopnea.   Gastrointestinal:  Negative for abdominal pain, constipation and diarrhea.   Genitourinary:  Negative for dysuria, hematuria and urgency.   Musculoskeletal:  Positive for joint pain.  Negative for back pain and myalgias.   Skin:  Negative for rash.   Neurological:  Negative for dizziness, tingling, focal weakness and headaches.   Endo/Heme/Allergies:  Does not bruise/bleed easily.   Psychiatric/Behavioral:  Negative for depression, hallucinations and suicidal ideas.                Objective:            Past Medical History:   Diagnosis Date    Allergic rhinitis     Diabetes mellitus, type 2     History of colon polyps     Hyperlipidemia     Psoriatic arthritis      Family History   Problem Relation Name Age of Onset    Heart disease Mother      Diabetes Mother      Brain cancer Father          Glioblastoma     Psoriasis Father      Thyroid disease Sister          hypothyroidism     Diabetes Sister      Psoriasis Sister      Cerebral palsy Daughter      Heart disease Daughter      Diabetes Daughter      No Known Problems Son      Stroke Maternal Grandmother      Diabetes Maternal Grandmother      Glaucoma Maternal Grandmother      Diabetes Maternal Grandfather      Ovarian cancer Paternal Grandmother      Psoriasis Paternal Grandmother      Pancreatic cancer Paternal Grandfather      No Known Problems Sister      Psoriasis Brother      Macular degeneration Neg Hx      Eczema Neg Hx      Lupus Neg Hx      Melanoma Neg Hx       Social History     Tobacco Use    Smoking status: Former     Current packs/day: 0.00     Average packs/day: 1 pack/day for 40.0 years (40.0 ttl pk-yrs)     Types: Cigarettes     Start date: 1979     Quit date: 2019     Years since quittin.6    Smokeless tobacco: Never    Tobacco comments:     former smoker   Substance Use Topics    Alcohol use: Never    Drug use: Never         Current Outpatient Medications on File Prior to Visit   Medication Sig Dispense Refill    alendronate (FOSAMAX) 70 MG tablet Take 1 tablet (70 mg total) by mouth every 7 days. 12 tablet 3    ascorbic acid, vitamin C, (VITAMIN C) 500 MG tablet Take 500 mg by mouth once daily.      aspirin  81 MG Chew Take 81 mg by mouth once daily.      blood sugar diagnostic (FREESTYLE LITE STRIPS) Strp TEST BLOOD SUGAR TWICE DAILY 200 strip 3    cetirizine (ZYRTEC) 10 MG tablet Take 1 tablet (10 mg total) by mouth once daily. 90 tablet 3    cholecalciferol, vitamin D3, (VITAMIN D3) 50 mcg (2,000 unit) Cap capsule Take 2,000 Units by mouth.      clobetasol 0.05% (TEMOVATE) 0.05 % Oint Clobetasol propionate 0.05% once daily at night for 4 weeks, then alternate nights for 4 weeks, and then twice weekly for 4 weeks 45 g 2    famotidine (PEPCID) 40 MG tablet TAKE 1 TABLET DAILY 90 tablet 3    fluticasone propionate (FLONASE) 50 mcg/actuation nasal spray 1 spray (50 mcg total) by Each Nostril route once daily. 16 g 3    folic acid (FOLVITE) 1 MG tablet Take 1 tablet (1 mg total) by mouth once daily. 100 tablet 3    methotrexate 2.5 MG Tab TAKE 4 TABLETS EVERY 7 DAYS 48 tablet 3    pantoprazole (PROTONIX) 40 MG tablet TAKE 1 TABLET DAILY 90 tablet 3    rosuvastatin (CRESTOR) 5 MG tablet TAKE 1 TABLET EVERY EVENING 90 tablet 3    sulfacetamide sodium-sulfur 10-5 % (w/w) Clsr Use to wash face daily 340 g 11    tirzepatide 7.5 mg/0.5 mL PnIj Inject 7.5 mg into the skin every 7 days. 12 Pen 3    tiZANidine (ZANAFLEX) 4 MG tablet Take 1 tablet (4 mg total) by mouth nightly as needed (spasm of muscle). 90 tablet 3    turmeric root extract 500 mg Cap Take 500 mg by mouth once daily at 6am.      ustekinumab (STELARA) 45 mg/0.5 mL Syrg syringe AT THE START OF THERAPY, INJECT 45 MG UNDER THE SKIN ON WEEK 0 AND WEEK 4, THEN EVERY 12 WEEKS THEREAFTER 0.5 mL 3    ustekinumab (STELARA) 90 mg/mL Syrg syringe Inject 1 mL (90 mg total) into the skin every 12 weeks. 1 mL 4    vitamin B complex (SUPER B COMPLEX-B-12 ORAL)        No current facility-administered medications on file prior to visit.       Vitals:    09/19/24 1019   BP: (!) 142/78   Pulse: 74       Physical Exam:    Physical Exam  Constitutional:       Appearance: She is  well-developed.   Eyes:      General: Lids are normal.   Skin:     Comments: Flare psoriasis EAC bilaterally with prominent scale and erythema.    Neurological:      Mental Status: She is oriented to person, place, and time.   Psychiatric:         Behavior: Behavior normal.         Thought Content: Thought content normal.     There is currently no information documented on the homunculus. Go to the Rheumatology activity and complete the homunculus joint exam.          Rapid 3: 4.78. (8/2022)           Assessment:       Encounter Diagnoses   Name Primary?    Psoriatic arthritis Yes    Immunosuppression due to drug therapy     High risk medications (not anticoagulants) long-term use                 Plan:        Psoriatic arthritis  -     ALT (SGPT); Future; Expected date: 09/19/2024  -     AST (SGOT); Future; Expected date: 09/19/2024  -     CBC Auto Differential; Future; Expected date: 09/19/2024  -     C-Reactive Protein; Future; Expected date: 09/19/2024  -     Creatinine, Serum; Future; Expected date: 09/19/2024  -     Sedimentation rate; Future; Expected date: 09/19/2024    Immunosuppression due to drug therapy  -     ALT (SGPT); Future; Expected date: 09/19/2024  -     AST (SGOT); Future; Expected date: 09/19/2024  -     CBC Auto Differential; Future; Expected date: 09/19/2024  -     C-Reactive Protein; Future; Expected date: 09/19/2024  -     Creatinine, Serum; Future; Expected date: 09/19/2024  -     Sedimentation rate; Future; Expected date: 09/19/2024    High risk medications (not anticoagulants) long-term use  -     ALT (SGPT); Future; Expected date: 09/19/2024  -     AST (SGOT); Future; Expected date: 09/19/2024  -     CBC Auto Differential; Future; Expected date: 09/19/2024  -     C-Reactive Protein; Future; Expected date: 09/19/2024  -     Creatinine, Serum; Future; Expected date: 09/19/2024  -     Sedimentation rate; Future; Expected date: 09/19/2024       Patient with long standing PsA:    -Started  Stelara 9/2022 [facility]   [facility] no [facility] needed per [facility] 9/2022 [facility] which I did on 9/7/2022 with 5 refills. Patient is due 1/4/2023.     Enbrel without adverse side effects no mention exact type.  Humira was loss of back efficacy over time,  Cimzia no benefit.   Otezla 30 mg once daily (6201-1895)- losing efficacy  Celebrex but developed a  gastritis despite this.  Cosentyx 300mg monthly still with rising Rapid 3 and skin changes.       Continue Stelara- just rceived 90mg dosage this month 9/2024 we will see how this work.   Continue Methotrexate. She is to take 4 tablets ONE TIME WEEKLY. Folic acid is one tablet daily  Labs in 3 months.     Regarding chronic right GTB if persists I will have Dr. Lofton consider US guided injection vs SIJ inject and did discuss Tenex.      No follow-ups on file.        40min consultation with greater than 50% of that time included Preparing to see the patient (review records, tests), Obtaining and/or reviewing separately obtained historical data, Performing a medically appropriate examination and/or evaluation , Ordering medications, tests, and/or procedures, Referring and communicating with other healthcare professionals , Documenting clinical information in the electronic or other health record and Independently interpreting results  (as warranted) & communicating results to the patient/family/caregiver. All questions answered.

## 2024-09-20 ENCOUNTER — CLINICAL SUPPORT (OUTPATIENT)
Dept: AUDIOLOGY | Facility: CLINIC | Age: 64
End: 2024-09-20
Payer: MEDICARE

## 2024-09-20 DIAGNOSIS — H90.0 CONDUCTIVE HEARING LOSS, BILATERAL: ICD-10-CM

## 2024-09-20 DIAGNOSIS — H91.93 BILATERAL HEARING LOSS, UNSPECIFIED HEARING LOSS TYPE: Primary | ICD-10-CM

## 2024-09-20 PROCEDURE — 92567 TYMPANOMETRY: CPT | Mod: S$GLB,,, | Performed by: AUDIOLOGIST

## 2024-09-20 PROCEDURE — 99999 PR PBB SHADOW E&M-EST. PATIENT-LVL I: CPT | Mod: PBBFAC,,, | Performed by: AUDIOLOGIST

## 2024-09-20 PROCEDURE — 92556 SPEECH AUDIOMETRY COMPLETE: CPT | Mod: S$GLB,,, | Performed by: AUDIOLOGIST

## 2024-09-20 PROCEDURE — 92552 PURE TONE AUDIOMETRY AIR: CPT | Mod: S$GLB,,, | Performed by: AUDIOLOGIST

## 2024-09-20 NOTE — PROGRESS NOTES
The patient was referred by Dr. Deyvi Vela for a hearing evaluation.    The patient has a history of bilateral sensorineural hearing loss. Patient is here today for annual hearing evaluation.    Otoscopic screening revealed a clear view of TM AU    A hearing evaluation was performed today. Test results indicated a mild to moderately severe hearing loss bilaterally. (Air only since results were essentially the same as those from 1/27/23) Impedance testing showed a Type A tympanogram in each ear, consistent with normal middle ear function.    The recommendations were as follows:    (1)  Hearing aid consult pending medical clearance  (2)  Ear protection in loud noise   (3)  Hearing evaluation in one year or sooner if hearing decrease is noted       Today's test results and recommendations were discussed with the patient.

## 2024-10-15 ENCOUNTER — PATIENT MESSAGE (OUTPATIENT)
Dept: RESEARCH | Facility: HOSPITAL | Age: 64
End: 2024-10-15
Payer: MEDICARE

## 2024-10-23 ENCOUNTER — PATIENT MESSAGE (OUTPATIENT)
Dept: ORTHOPEDICS | Facility: CLINIC | Age: 64
End: 2024-10-23
Payer: MEDICARE

## 2024-10-24 ENCOUNTER — OFFICE VISIT (OUTPATIENT)
Dept: PODIATRY | Facility: CLINIC | Age: 64
End: 2024-10-24
Payer: MEDICARE

## 2024-10-24 VITALS — BODY MASS INDEX: 30.3 KG/M2 | WEIGHT: 181.88 LBS | HEIGHT: 65 IN

## 2024-10-24 DIAGNOSIS — G57.53 TARSAL TUNNEL SYNDROME, BILATERAL: Primary | ICD-10-CM

## 2024-10-24 PROCEDURE — 99999 PR PBB SHADOW E&M-EST. PATIENT-LVL III: CPT | Mod: PBBFAC,,, | Performed by: STUDENT IN AN ORGANIZED HEALTH CARE EDUCATION/TRAINING PROGRAM

## 2024-10-24 RX ORDER — DEXAMETHASONE SODIUM PHOSPHATE 4 MG/ML
4 INJECTION, SOLUTION INTRA-ARTICULAR; INTRALESIONAL; INTRAMUSCULAR; INTRAVENOUS; SOFT TISSUE
Status: COMPLETED | OUTPATIENT
Start: 2024-10-24 | End: 2024-10-24

## 2024-10-24 RX ADMIN — DEXAMETHASONE SODIUM PHOSPHATE 4 MG: 4 INJECTION, SOLUTION INTRA-ARTICULAR; INTRALESIONAL; INTRAMUSCULAR; INTRAVENOUS; SOFT TISSUE at 04:10

## 2024-10-24 NOTE — PROGRESS NOTES
Subjective:      Patient ID: Charla Patrick is a 64 y.o. female.    Chief Complaint: Foot Pain, Heel Pain, and Ankle Pain (Ankle pain better, bilateral heel pain, come and goes, heels hurt and burn, DM, pain from arch to ankle sometimes)    Ms. Patrick presents today with b/l burning heel pain. She notes that it is different than her usual plantar fasciitis with the R>L. The right has shooting pain up to the leg. This has been ongoing over the last 1-2 months.    Review of Systems   Neurological:  Positive for paresthesias.   All other systems reviewed and are negative.          Objective:      Physical Exam  Cardiovascular:      Pulses:           Dorsalis pedis pulses are 2+ on the right side and 2+ on the left side.        Posterior tibial pulses are 2+ on the right side and 2+ on the left side.   Feet:      Comments: TTP with + tinels sign over the tarsal tunnel b/l. R>L. Minor tenderness at the medial calcaneal tubercle.             Assessment:       Encounter Diagnosis   Name Primary?    Tarsal tunnel syndrome, bilateral Yes         Plan:       Charla was seen today for foot pain, heel pain and ankle pain.    Diagnoses and all orders for this visit:    Tarsal tunnel syndrome, bilateral    Other orders  -     dexAMETHasone injection 4 mg      I counseled the patient on her conditions, their implications and medical management.    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    Recommend injection today and f/u as needed.    Rory Mejia DPM    With patient's verbal consent, nerve blocks were performed to the R tarsal tunnel. She tolerated the procedure w/o complications and notes some relief. Injection consisted of 1% lido and 4mg dex.

## 2024-10-25 DIAGNOSIS — L40.9 PSORIASIS: ICD-10-CM

## 2024-10-25 DIAGNOSIS — L40.9 SCALP PSORIASIS: ICD-10-CM

## 2024-10-25 DIAGNOSIS — L40.50 PSORIATIC ARTHRITIS: ICD-10-CM

## 2024-10-29 DIAGNOSIS — Z00.00 ENCOUNTER FOR MEDICARE ANNUAL WELLNESS EXAM: ICD-10-CM

## 2024-10-29 RX ORDER — METHOTREXATE 2.5 MG/1
TABLET ORAL
Qty: 48 TABLET | Refills: 1 | Status: SHIPPED | OUTPATIENT
Start: 2024-10-29

## 2024-11-01 ENCOUNTER — PATIENT MESSAGE (OUTPATIENT)
Dept: PODIATRY | Facility: CLINIC | Age: 64
End: 2024-11-01
Payer: MEDICARE

## 2024-11-20 ENCOUNTER — OFFICE VISIT (OUTPATIENT)
Dept: FAMILY MEDICINE | Facility: CLINIC | Age: 64
End: 2024-11-20
Payer: MEDICARE

## 2024-11-20 VITALS
HEIGHT: 65 IN | OXYGEN SATURATION: 98 % | BODY MASS INDEX: 30.42 KG/M2 | SYSTOLIC BLOOD PRESSURE: 132 MMHG | DIASTOLIC BLOOD PRESSURE: 80 MMHG | HEART RATE: 63 BPM | WEIGHT: 182.56 LBS

## 2024-11-20 DIAGNOSIS — E11.69 HYPERLIPIDEMIA ASSOCIATED WITH TYPE 2 DIABETES MELLITUS: ICD-10-CM

## 2024-11-20 DIAGNOSIS — D84.821 IMMUNOSUPPRESSION DUE TO DRUG THERAPY: ICD-10-CM

## 2024-11-20 DIAGNOSIS — Z00.00 ENCOUNTER FOR PREVENTIVE HEALTH EXAMINATION: Primary | ICD-10-CM

## 2024-11-20 DIAGNOSIS — Z00.00 ENCOUNTER FOR MEDICARE ANNUAL WELLNESS EXAM: ICD-10-CM

## 2024-11-20 DIAGNOSIS — E11.9 TYPE 2 DIABETES MELLITUS WITHOUT COMPLICATION, WITHOUT LONG-TERM CURRENT USE OF INSULIN: ICD-10-CM

## 2024-11-20 DIAGNOSIS — I70.0 THORACIC AORTIC ATHEROSCLEROSIS: ICD-10-CM

## 2024-11-20 DIAGNOSIS — M81.0 OSTEOPOROSIS, UNSPECIFIED OSTEOPOROSIS TYPE, UNSPECIFIED PATHOLOGICAL FRACTURE PRESENCE: ICD-10-CM

## 2024-11-20 DIAGNOSIS — K21.9 GASTROESOPHAGEAL REFLUX DISEASE WITHOUT ESOPHAGITIS: ICD-10-CM

## 2024-11-20 DIAGNOSIS — E78.5 HYPERLIPIDEMIA ASSOCIATED WITH TYPE 2 DIABETES MELLITUS: ICD-10-CM

## 2024-11-20 DIAGNOSIS — Z79.899 IMMUNOSUPPRESSION DUE TO DRUG THERAPY: ICD-10-CM

## 2024-11-20 DIAGNOSIS — L40.50 PSORIATIC ARTHRITIS: ICD-10-CM

## 2024-11-20 PROCEDURE — 1158F ADVNC CARE PLAN TLK DOCD: CPT | Mod: CPTII,S$GLB,, | Performed by: NURSE PRACTITIONER

## 2024-11-20 PROCEDURE — 3044F HG A1C LEVEL LT 7.0%: CPT | Mod: CPTII,S$GLB,, | Performed by: NURSE PRACTITIONER

## 2024-11-20 PROCEDURE — 3079F DIAST BP 80-89 MM HG: CPT | Mod: CPTII,S$GLB,, | Performed by: NURSE PRACTITIONER

## 2024-11-20 PROCEDURE — 3066F NEPHROPATHY DOC TX: CPT | Mod: CPTII,S$GLB,, | Performed by: NURSE PRACTITIONER

## 2024-11-20 PROCEDURE — 3075F SYST BP GE 130 - 139MM HG: CPT | Mod: CPTII,S$GLB,, | Performed by: NURSE PRACTITIONER

## 2024-11-20 PROCEDURE — 3061F NEG MICROALBUMINURIA REV: CPT | Mod: CPTII,S$GLB,, | Performed by: NURSE PRACTITIONER

## 2024-11-20 PROCEDURE — 1160F RVW MEDS BY RX/DR IN RCRD: CPT | Mod: CPTII,S$GLB,, | Performed by: NURSE PRACTITIONER

## 2024-11-20 PROCEDURE — G0402 INITIAL PREVENTIVE EXAM: HCPCS | Mod: S$GLB,,, | Performed by: NURSE PRACTITIONER

## 2024-11-20 PROCEDURE — 1101F PT FALLS ASSESS-DOCD LE1/YR: CPT | Mod: CPTII,S$GLB,, | Performed by: NURSE PRACTITIONER

## 2024-11-20 PROCEDURE — 99999 PR PBB SHADOW E&M-EST. PATIENT-LVL V: CPT | Mod: PBBFAC,,, | Performed by: NURSE PRACTITIONER

## 2024-11-20 PROCEDURE — 3288F FALL RISK ASSESSMENT DOCD: CPT | Mod: CPTII,S$GLB,, | Performed by: NURSE PRACTITIONER

## 2024-11-20 PROCEDURE — 1159F MED LIST DOCD IN RCRD: CPT | Mod: CPTII,S$GLB,, | Performed by: NURSE PRACTITIONER

## 2024-11-20 NOTE — PATIENT INSTRUCTIONS
Counseling and Referral of Other Preventative  (Italic type indicates deductible and co-insurance are waived)    Patient Name: Charla Patrick  Today's Date: 11/20/2024    Health Maintenance       Date Due Completion Date    Eye Exam 08/29/2023 8/29/2022    HIV Screening 08/21/2026 (Originally 4/1/1975) ---    Hemoglobin A1c 01/17/2025 7/17/2024    LDCT Lung Screen 01/26/2025 1/26/2024    Diabetes Urine Screening 07/17/2025 7/17/2024    Foot Exam 07/17/2025 7/17/2024    Override on 8/21/2020: Done    Lipid Panel 07/17/2025 7/17/2024    Mammogram 08/07/2025 8/7/2024    High Dose Statin 11/20/2025 11/20/2024    Aspirin/Antiplatelet Therapy 11/20/2025 11/20/2024    Colorectal Cancer Screening 08/10/2026 8/10/2021    Override on 6/14/2019: Done    DEXA Scan 08/07/2027 8/7/2024    TETANUS VACCINE 08/29/2033 8/29/2023        No orders of the defined types were placed in this encounter.    The following information is provided to all patients.  This information is to help you find resources for any of the problems found today that may be affecting your health:                  Living healthy guide: www.[facility].gov      Understanding Diabetes: www.diabetes.org      Eating healthy: www.cdc.gov/healthyweight      CDC home safety checklist: www.cdc.gov/steadi/patient.html      Agency on Aging: www.goea.[place].[facility]      Alcoholics anonymous (AA): www.aa.org      Physical Activity: www.kwabena.nih.gov/wg7cyuo      Tobacco use: www.quitwithusla.org

## 2024-11-20 NOTE — PROGRESS NOTES
"Charla Patrick presented for an initial Medicare AWV today. The following components were reviewed and updated:    Medical history  Family History  Social history  Allergies and Current Medications  Health Risk Assessment  Health Maintenance  Care Team    **See Completed Assessments for Annual Wellness visit with in the encounter summary    The following assessments were completed:  Depression Screening  Cognitive function Screening    Timed Get Up Test  Whisper Test    Opioid documentation:  Patient does not have a current opioid prescription.        Vitals:    11/20/24 0910   BP: 132/80   BP Location: Left arm   Patient Position: Sitting   Pulse: 63   SpO2: 98%   Weight: 82.8 kg (182 lb 8.7 oz)   Height: 5' 5" (1.651 m)     Body mass index is 30.38 kg/m².     Physical Exam  Vitals reviewed.   Constitutional:       General: She is not in acute distress.  Pulmonary:      Effort: Pulmonary effort is normal. No respiratory distress.   Neurological:      Mental Status: She is alert and oriented to person, place, and time.   Psychiatric:         Mood and Affect: Mood normal.         Behavior: Behavior normal.         Thought Content: Thought content normal.         Judgment: Judgment normal.         Diagnoses and health risks identified today and associated recommendations/orders:  1. Encounter for preventive health examination  Reviewed and discussed health maintenance.    - Ambulatory referral/consult to Optometry; Future  Foot exam with podiatry ()  Vaccines up to date    2. Encounter for Medicare annual wellness exam  - Ambulatory Referral/Consult to Enhanced Annual Wellness Visit (eAWV)    3. Type 2 diabetes mellitus without complication, without long-term current use of insulin  Stable- continue current treatment and follow up routinely with PCP   Encouraged healthy eating, weight loss and routine exercise   - Ambulatory referral/consult to Optometry; Future  tirzepatide 7.5mg weekly  Lab Results "   Component Value Date    HGBA1C 6.1 (H) 07/17/2024      4. Hyperlipidemia associated with type 2 diabetes mellitus   5. Thoracic aortic atherosclerosis  Stable- continue current treatment and follow up routinely with PCP and cardiology ()  Encouraged healthy eating, weight loss and routine exercise   Crestor 5mg daily and tirzepatide 7.5mg weekly  Htn and hld controlled  Lab Results   Component Value Date    CHOL 136 07/17/2024    CHOL 123 09/01/2023    CHOL 158 05/30/2023     Lab Results   Component Value Date    HDL 56 07/17/2024    HDL 45 09/01/2023    HDL 45 05/30/2023     Lab Results   Component Value Date    LDLCALC 58.2 (L) 07/17/2024    LDLCALC 47.4 (L) 09/01/2023    LDLCALC 61.8 (L) 05/30/2023     Lab Results   Component Value Date    TRIG 109 07/17/2024    TRIG 153 (H) 09/01/2023    TRIG 256 (H) 05/30/2023       Lab Results   Component Value Date    CHOLHDL 41.2 07/17/2024    CHOLHDL 36.6 09/01/2023    CHOLHDL 28.5 05/30/2023        6. Psoriatic arthritis  7. Immunosuppression due to drug therapy  Stable- continue current treatment and follow up routinely with PCP and rheumatology ()  Methotrexate 2.5mg -4 tabs every 7 days and stelara 90mg every 12 weeks    8. Gastroesophageal reflux disease without esophagitis   Stable- continue current treatment and follow up routinely with PCP   Pepcid 40mg nightly and protonix in am    9. Osteoporosis, unspecified osteoporosis type, unspecified pathological fracture presence   Stable- continue current treatment and follow up routinely with PCP   DEXA- 8/2024  Fosamax 70mg weekly and otc vti d supplement  Provided Charla with a 5-10 year written screening schedule and personal prevention plan. Recommendations were developed using the USPSTF age appropriate recommendations. Education, counseling, and referrals were provided as needed.  After Visit Summary printed and given to patient which includes a list of additional screenings\tests needed.    I  offered to discuss advanced care planning, including how to pick a person who would make decisions for you if you were unable to make them for yourself, called a health care power of , and what kind of decisions you might make such as use of life sustaining treatments such as ventilators and tube feeding when faced with a life limiting illness recorded on a living will that they will need to know. (How you want to be cared for as you near the end of your natural life)   X  Patient has advanced directive written but has opted not to place them on file with the institution.  Alba Alexander, NP

## 2024-11-22 ENCOUNTER — PATIENT MESSAGE (OUTPATIENT)
Dept: PODIATRY | Facility: CLINIC | Age: 64
End: 2024-11-22
Payer: MEDICARE

## 2024-12-17 ENCOUNTER — PATIENT MESSAGE (OUTPATIENT)
Dept: FAMILY MEDICINE | Facility: CLINIC | Age: 64
End: 2024-12-17
Payer: MEDICARE

## 2024-12-17 DIAGNOSIS — E11.9 TYPE 2 DIABETES MELLITUS WITHOUT COMPLICATION, WITHOUT LONG-TERM CURRENT USE OF INSULIN: ICD-10-CM

## 2024-12-18 ENCOUNTER — PATIENT MESSAGE (OUTPATIENT)
Dept: FAMILY MEDICINE | Facility: CLINIC | Age: 64
End: 2024-12-18
Payer: MEDICARE

## 2024-12-18 NOTE — TELEPHONE ENCOUNTER
Care Due:                  Date            Visit Type   Department     Provider  --------------------------------------------------------------------------------                                EP -                              [facility]  Last Visit: 07-      CARE ([facility])   DHIRAJ Vela                               -                              [facility]  Next Visit: 01-      CARE ([facility])   DHIRAJ Vela                                                            Last  Test          Frequency    Reason                     Performed    Due Date  --------------------------------------------------------------------------------    HBA1C.......  6 months...  tirzepatide..............  07- 01-    Mg Level....  12 months..  alendronate..............  Not Found    Overdue    Phosphate...  12 months..  alendronate..............  Not Found    Overdue    [employer] Embedded Care Due Messages. Reference number: [id number].   12/18/2024 5:36:34 PM CST

## 2025-01-02 ENCOUNTER — OFFICE VISIT (OUTPATIENT)
Dept: FAMILY MEDICINE | Facility: CLINIC | Age: 65
End: 2025-01-02
Payer: MEDICARE

## 2025-01-02 VITALS
BODY MASS INDEX: 29.68 KG/M2 | WEIGHT: 178.13 LBS | OXYGEN SATURATION: 96 % | HEART RATE: 93 BPM | SYSTOLIC BLOOD PRESSURE: 118 MMHG | HEIGHT: 65 IN | DIASTOLIC BLOOD PRESSURE: 70 MMHG

## 2025-01-02 DIAGNOSIS — J01.40 ACUTE NON-RECURRENT PANSINUSITIS: ICD-10-CM

## 2025-01-02 DIAGNOSIS — E11.69 HYPERLIPIDEMIA ASSOCIATED WITH TYPE 2 DIABETES MELLITUS: ICD-10-CM

## 2025-01-02 DIAGNOSIS — N18.31 CHRONIC KIDNEY DISEASE, STAGE 3A: ICD-10-CM

## 2025-01-02 DIAGNOSIS — E78.5 HYPERLIPIDEMIA ASSOCIATED WITH TYPE 2 DIABETES MELLITUS: ICD-10-CM

## 2025-01-02 DIAGNOSIS — L40.50 PSORIATIC ARTHRITIS: ICD-10-CM

## 2025-01-02 DIAGNOSIS — R68.89 FLU-LIKE SYMPTOMS: Primary | ICD-10-CM

## 2025-01-02 PROCEDURE — 99999 PR PBB SHADOW E&M-EST. PATIENT-LVL IV: CPT | Mod: PBBFAC,,, | Performed by: INTERNAL MEDICINE

## 2025-01-02 RX ORDER — TRIAMCINOLONE ACETONIDE 40 MG/ML
40 INJECTION, SUSPENSION INTRA-ARTICULAR; INTRAMUSCULAR
Status: COMPLETED | OUTPATIENT
Start: 2025-01-02 | End: 2025-01-02

## 2025-01-02 RX ORDER — OSELTAMIVIR PHOSPHATE 75 MG/1
75 CAPSULE ORAL 2 TIMES DAILY
Qty: 10 CAPSULE | Refills: 0 | Status: SHIPPED | OUTPATIENT
Start: 2025-01-02 | End: 2025-01-07

## 2025-01-02 RX ORDER — AMOXICILLIN AND CLAVULANATE POTASSIUM 875; 125 MG/1; MG/1
1 TABLET, FILM COATED ORAL EVERY 12 HOURS
Qty: 14 TABLET | Refills: 0 | Status: SHIPPED | OUTPATIENT
Start: 2025-01-02 | End: 2025-01-09

## 2025-01-02 RX ADMIN — TRIAMCINOLONE ACETONIDE 40 MG: 40 INJECTION, SUSPENSION INTRA-ARTICULAR; INTRAMUSCULAR at 10:01

## 2025-01-02 NOTE — PROGRESS NOTES
"[facility]  [facility]   [address]       Patient ID: Charla Patrick     Chief Complaint:   Chief Complaint   Patient presents with    Sore Throat    Cough    Hoarse    Headache    Nasal Congestion        HPI:  Patient has been sick with symptoms of an upper respiratory tract infection for the past 5 days.  Symptoms include fever, nasal congestion, postnasal drip with some sore throat, cough that has mainly dry, intense headache at the top of her head.  She presented to urgent care 5 days ago and tested negative for COVID flu and strep so they gave her a Z-Cachorro and sent her home.  Her symptoms did not improve and 2 days later her  came down with similar symptoms.  He was also seen at urgent Care and tested negative for flu COVID and strep so he was also sent home.  His coughing would not stop and he does have COPD so he went to the hospital where he was admitted for a COPD flare due to influenza A.  I have a sneaky feeling that she actually does have influenza A.  I also wonder if she has a sinus infection.  She does not give me a lot of lung complaints and indeed her lungs sound good.  She finished the azithromycin today, so I will give her a course of Augmentin.  She requests a shot of steroids which I will comply with as well because she needs to care for her  and also her mother who is also my patient and she is not in the best of health.  If things are not improving in the next 24-48 hours, I want her to start Tamiflu.  Even though it is technically outside the window, she may have developed a suspected influenza illness sometime sooner.    Review of Systems       Fever coughing malaise     Objective:      Physical Exam   Physical Exam       Looks ill   Lungs sound good  In  Vitals:   Vitals:    01/02/25 0930   BP: 118/70   Pulse: 93   SpO2: 96%   Weight: 80.8 kg (178 lb 2.1 oz)   Height: 5' 5" (1.651 m)        Assessment:           Plan:       Charla Patirck  " was seen today for follow-up and may need lab work.    Diagnoses and all orders for this visit:    Charla was seen today for sore throat, cough, hoarse, headache and nasal congestion.    Diagnoses and all orders for this visit:    Flu-like symptoms  -     triamcinolone acetonide injection 40 mg  -     oseltamivir (TAMIFLU) 75 MG capsule; Take 1 capsule (75 mg total) by mouth 2 (two) times daily. for 5 days  Steroid shot today and take Tamiflu if you are not improving in 24-48 hours    Acute non-recurrent pansinusitis  -     amoxicillin-clavulanate 875-125mg (AUGMENTIN) 875-125 mg per tablet; Take 1 tablet by mouth every 12 (twelve) hours. for 7 days    Chronic kidney disease, stage 3a  Stable    Hyperlipidemia associated with type 2 diabetes mellitus  Has repeat labs soon    Psoriatic arthritis  Management per rheumatology         Deyvi Vela MD

## 2025-01-06 DIAGNOSIS — M85.852 OSTEOPENIA OF BOTH HIPS: ICD-10-CM

## 2025-01-06 DIAGNOSIS — M85.851 OSTEOPENIA OF BOTH HIPS: ICD-10-CM

## 2025-01-06 NOTE — TELEPHONE ENCOUNTER
No care due was identified.  [facility] Embedded Care Due Messages. Reference number: [id number].   1/06/2025 11:31:12 AM CST

## 2025-01-07 RX ORDER — ALENDRONATE SODIUM 70 MG/1
70 TABLET ORAL
Qty: 12 TABLET | Refills: 3 | Status: SHIPPED | OUTPATIENT
Start: 2025-01-07 | End: 2026-01-07

## 2025-01-17 ENCOUNTER — OFFICE VISIT (OUTPATIENT)
Dept: OPTOMETRY | Facility: CLINIC | Age: 65
End: 2025-01-17
Payer: MEDICARE

## 2025-01-17 ENCOUNTER — LAB VISIT (OUTPATIENT)
Dept: LAB | Facility: HOSPITAL | Age: 65
End: 2025-01-17
Attending: INTERNAL MEDICINE
Payer: MEDICARE

## 2025-01-17 DIAGNOSIS — Z79.899 HIGH RISK MEDICATIONS (NOT ANTICOAGULANTS) LONG-TERM USE: ICD-10-CM

## 2025-01-17 DIAGNOSIS — Z00.00 ENCOUNTER FOR PREVENTIVE HEALTH EXAMINATION: ICD-10-CM

## 2025-01-17 DIAGNOSIS — H52.4 MYOPIA WITH ASTIGMATISM AND PRESBYOPIA, BILATERAL: ICD-10-CM

## 2025-01-17 DIAGNOSIS — E11.9 TYPE 2 DIABETES MELLITUS WITHOUT RETINOPATHY: Primary | ICD-10-CM

## 2025-01-17 DIAGNOSIS — E11.9 TYPE 2 DIABETES MELLITUS WITHOUT COMPLICATION, WITHOUT LONG-TERM CURRENT USE OF INSULIN: ICD-10-CM

## 2025-01-17 DIAGNOSIS — H43.812 POSTERIOR VITREOUS DETACHMENT OF LEFT EYE: ICD-10-CM

## 2025-01-17 DIAGNOSIS — L40.50 PSORIATIC ARTHRITIS: ICD-10-CM

## 2025-01-17 DIAGNOSIS — D84.821 IMMUNOSUPPRESSION DUE TO DRUG THERAPY: ICD-10-CM

## 2025-01-17 DIAGNOSIS — H52.13 MYOPIA WITH ASTIGMATISM AND PRESBYOPIA, BILATERAL: ICD-10-CM

## 2025-01-17 DIAGNOSIS — H52.203 MYOPIA WITH ASTIGMATISM AND PRESBYOPIA, BILATERAL: ICD-10-CM

## 2025-01-17 DIAGNOSIS — Z79.899 IMMUNOSUPPRESSION DUE TO DRUG THERAPY: ICD-10-CM

## 2025-01-17 LAB
ALT SERPL W/O P-5'-P-CCNC: 24 U/L (ref 10–44)
AST SERPL-CCNC: 18 U/L (ref 10–40)
BASOPHILS # BLD AUTO: 0.04 K/UL (ref 0–0.2)
BASOPHILS NFR BLD: 0.6 % (ref 0–1.9)
CREAT SERPL-MCNC: 0.8 MG/DL (ref 0.5–1.4)
CRP SERPL-MCNC: 4.4 MG/L (ref 0–8.2)
DIFFERENTIAL METHOD BLD: ABNORMAL
EOSINOPHIL # BLD AUTO: 0.1 K/UL (ref 0–0.5)
EOSINOPHIL NFR BLD: 1.7 % (ref 0–8)
ERYTHROCYTE [DISTWIDTH] IN BLOOD BY AUTOMATED COUNT: 14.1 % (ref 11.5–14.5)
ERYTHROCYTE [SEDIMENTATION RATE] IN BLOOD BY PHOTOMETRIC METHOD: 16 MM/HR (ref 0–36)
EST. GFR  (NO RACE VARIABLE): >60 ML/MIN/1.73 M^2
HCT VFR BLD AUTO: 40.1 % (ref 37–48.5)
HGB BLD-MCNC: 12.7 G/DL (ref 12–16)
IMM GRANULOCYTES # BLD AUTO: 0.02 K/UL (ref 0–0.04)
IMM GRANULOCYTES NFR BLD AUTO: 0.3 % (ref 0–0.5)
LYMPHOCYTES # BLD AUTO: 1.5 K/UL (ref 1–4.8)
LYMPHOCYTES NFR BLD: 22 % (ref 18–48)
MCH RBC QN AUTO: 29.1 PG (ref 27–31)
MCHC RBC AUTO-ENTMCNC: 31.7 G/DL (ref 32–36)
MCV RBC AUTO: 92 FL (ref 82–98)
MONOCYTES # BLD AUTO: 0.6 K/UL (ref 0.3–1)
MONOCYTES NFR BLD: 9.2 % (ref 4–15)
NEUTROPHILS # BLD AUTO: 4.6 K/UL (ref 1.8–7.7)
NEUTROPHILS NFR BLD: 66.2 % (ref 38–73)
NRBC BLD-RTO: 0 /100 WBC
PLATELET # BLD AUTO: 257 K/UL (ref 150–450)
PMV BLD AUTO: 10.7 FL (ref 9.2–12.9)
RBC # BLD AUTO: 4.37 M/UL (ref 4–5.4)
WBC # BLD AUTO: 6.99 K/UL (ref 3.9–12.7)

## 2025-01-17 PROCEDURE — 1126F AMNT PAIN NOTED NONE PRSNT: CPT | Mod: CPTII,S$GLB,,

## 2025-01-17 PROCEDURE — 82565 ASSAY OF CREATININE: CPT | Performed by: INTERNAL MEDICINE

## 2025-01-17 PROCEDURE — 86140 C-REACTIVE PROTEIN: CPT | Performed by: INTERNAL MEDICINE

## 2025-01-17 PROCEDURE — 1159F MED LIST DOCD IN RCRD: CPT | Mod: CPTII,S$GLB,,

## 2025-01-17 PROCEDURE — 85025 COMPLETE CBC W/AUTO DIFF WBC: CPT | Performed by: INTERNAL MEDICINE

## 2025-01-17 PROCEDURE — 99999 PR PBB SHADOW E&M-EST. PATIENT-LVL III: CPT | Mod: PBBFAC,,,

## 2025-01-17 PROCEDURE — 36415 COLL VENOUS BLD VENIPUNCTURE: CPT | Mod: PO | Performed by: INTERNAL MEDICINE

## 2025-01-17 PROCEDURE — 92014 COMPRE OPH EXAM EST PT 1/>: CPT | Mod: S$GLB,,,

## 2025-01-17 PROCEDURE — 85652 RBC SED RATE AUTOMATED: CPT | Performed by: INTERNAL MEDICINE

## 2025-01-17 PROCEDURE — 84450 TRANSFERASE (AST) (SGOT): CPT | Performed by: INTERNAL MEDICINE

## 2025-01-17 PROCEDURE — 92015 DETERMINE REFRACTIVE STATE: CPT | Mod: S$GLB,,,

## 2025-01-17 PROCEDURE — 2023F DILAT RTA XM W/O RTNOPTHY: CPT | Mod: CPTII,S$GLB,,

## 2025-01-17 PROCEDURE — 84460 ALANINE AMINO (ALT) (SGPT): CPT | Performed by: INTERNAL MEDICINE

## 2025-01-17 NOTE — PROGRESS NOTES
HPI    Pt states : here for annual DME , last dme 08/29/2022  Pt feels vision is stable since last exam w specs   No Gtts used  Ocass floaters Denies flashes     Hemoglobin A1C       Date                     Value               Ref Range             Status                07/17/2024               6.1 (H)             4.0 - 5.6 %           Final                  01/05/2024               6.4 (H)             4.0 - 5.6 %           Final                  05/30/2023               6.6 (H)             4.0 - 5.6 %           Final            Last edited by Lonnie Hamm, OD on 1/17/2025  9:37 AM.            Assessment /Plan     For exam results, see Encounter Report.    Type 2 diabetes mellitus without retinopathy    Type 2 diabetes mellitus without complication, without long-term current use of insulin  -     Ambulatory referral/consult to Optometry    Posterior vitreous detachment of left eye    Encounter for preventive health examination  -     Ambulatory referral/consult to Optometry    Myopia with astigmatism and presbyopia, bilateral      1-2. No diabetic retinopathy or macular edema evident on today's exam OD, OS. Ed pt on importance of maintaining strict BS control due to potential for visual fluctuations and/or vision loss. Monitor with yearly dilated exam.    3. PVD OS seen on Optos imaging. No holes, tears, RD to extent of view. Ed pt on the nature and etiology of PVDs. Reviewed signs and symptoms of a retinal detachment thoroughly and ed pt to RTC asap if experienced.    4. Discussed spectacle options with pt and released final spec rx. Ed pt on change in rx and adaptation.    5. See above.    *Dilation declined. Emphasized importance of dilation to pt. Optos and undilated fundus exam done. Ed pt that she must be dilated next year.*    RTC: 1 year for comprehensive exam or sooner prn

## 2025-01-21 ENCOUNTER — OFFICE VISIT (OUTPATIENT)
Dept: RHEUMATOLOGY | Facility: CLINIC | Age: 65
End: 2025-01-21
Payer: MEDICARE

## 2025-01-21 ENCOUNTER — TELEPHONE (OUTPATIENT)
Dept: FAMILY MEDICINE | Facility: CLINIC | Age: 65
End: 2025-01-21
Payer: MEDICARE

## 2025-01-21 DIAGNOSIS — L40.9 PSORIASIS: ICD-10-CM

## 2025-01-21 DIAGNOSIS — L40.50 PSORIATIC ARTHRITIS: Primary | ICD-10-CM

## 2025-01-21 DIAGNOSIS — Z79.899 HIGH RISK MEDICATIONS (NOT ANTICOAGULANTS) LONG-TERM USE: ICD-10-CM

## 2025-01-21 DIAGNOSIS — L40.9 SCALP PSORIASIS: ICD-10-CM

## 2025-01-21 DIAGNOSIS — D84.821 IMMUNOSUPPRESSION DUE TO DRUG THERAPY: ICD-10-CM

## 2025-01-21 DIAGNOSIS — Z79.899 IMMUNOSUPPRESSION DUE TO DRUG THERAPY: ICD-10-CM

## 2025-01-21 PROCEDURE — 98007 SYNCH AUDIO-VIDEO EST HI 40: CPT | Mod: 95,,, | Performed by: INTERNAL MEDICINE

## 2025-01-21 RX ORDER — METHOTREXATE 2.5 MG/1
TABLET ORAL
Qty: 48 TABLET | Refills: 1 | Status: SHIPPED | OUTPATIENT
Start: 2025-01-21

## 2025-01-21 RX ORDER — FOLIC ACID 1 MG/1
1 TABLET ORAL DAILY
Qty: 100 TABLET | Refills: 3 | Status: SHIPPED | OUTPATIENT
Start: 2025-01-21

## 2025-01-21 NOTE — PROGRESS NOTES
Subjective:          Chief Complaint: Charla Patrick is a 64 y.o. female who had no chief complaint listed for this encounter.    HPI:  Patient is a 64-year-old female with psoriatic arthritis   Diagnosed approx 2006 - acute psoriasis.   Joints began shortly after the skin, joint pain was located in the fingers, hips, and both feet (chronic plantar fasciitis) .      Aggravating factors with the morning evening and during activity.    Alleviating factors were exercise and medications associated factors joint stiffness, fatigue, lack of sleep, joint pain, joint swelling, abdominal pain and night sweats.      She also notes dry eyes and dry mouth psoriasis family history of other autoimmune conditions.    Patient denies ulcerative colitis, crohns or other colitis. Only IBS to date.   Previous issues with continued GI complaints and c/o depression more difficult to manage with Otezla so discontinued.   Small recurrent scalp lesion is most prevalent historically.   Started Cosentyx (8/2021-8/2022)- despite titration to 300mg q month still with progressive arthritis.   Stelara started 9/2022- present.     Left elbow with injury still hurting.   Right lateral hip waking with pain. Typically it will stop or be brief now seems present every morning for over 30min.   Rates all this pain 2/10 so when comparing to previous visit. Seems to be doing well with Stelara.   Morning stiffness: 15-20min but ++gelling phenomena.   Multi-Dimensional Health Assessment 8/25/2022 12/19/2022 4/19/2023   MHAQ Score 0.7 0.4 0.4   Psychologic Score 1.1 2.2 1.1   Pain Score 6 4 2   Fatigue Score 2.2 1.1 0   Global Health Score 6 3 1   RAPID3 Score 4.78 2.78 1.44     8/2023:  2.33   12/2023: 2.11 global 2.0    9/2024: Rapid 3: 2.78  Right lateral hip is waking at night and painful. No groin pain. Minimal back pain. Known hx of SIJ issues and at times can radiate. We did inject right GTB in 2022 limited benefit. Never had SIJ injection  Left  lateral epicondyle pulling a cooler, completed HEP/PT and doing well. More recent right lateral epicondyle flared few weeks ago. She does have associated     Current medication:  Stelara 45mg Q 12 w (start 9/2022)   MTX 4 tabs weekly  Folic acid 1 mg daily  tizandine 4mg tablets.   Tylenol PRN    Previous medications trialed:    Methotrexate began 2007.  Discontinued methotrexate 05/2017 no ASE or LAE.   Enbrel without adverse side effects no mention exact type.  Humira was loss of back efficacy over time,  Cimzia no benefit.   Otezla 30 mg once daily (4342-6052)- losing efficacy  Celebrex but developed a  gastritis despite this.  Cosentyx 300mg monthly (DC 9/2022) loss of efficacy  No personal hx of Cancer  No DVT/seizure or stroke, startin estradiol     Patient also has osteopenia her last DEXA 2018 was osteopenia but her FRAX score was below recommended bisphosphonate or other therapeutic ranges so she was continued on regular weight-bearing calcium and vitamin-D and repeat DXA is every 2 years      10/23/2019 ultrasound bilateral hands  Left hand visualized portions of the bone muscle tendon joints and median nerve are normal appearing particular evaluation at the MCP 2 3 and 5 transverse views also obtained no evidence of any erosions power Doppler use for synovitis and tenosynovitis which was also negative.  I have a TB from 03/23/2020 as a QuantiFERON gold that is negative      REVIEW OF SYSTEMS:    Review of Systems   Constitutional:  Negative for fever, malaise/fatigue and weight loss.   HENT:  Negative for sore throat.    Eyes:  Negative for double vision, photophobia and redness.   Respiratory:  Negative for cough, shortness of breath and wheezing.    Cardiovascular:  Negative for chest pain, palpitations and orthopnea.   Gastrointestinal:  Negative for abdominal pain, constipation and diarrhea.   Genitourinary:  Negative for dysuria, hematuria and urgency.   Musculoskeletal:  Positive for joint pain.  Negative for back pain and myalgias.   Skin:  Negative for rash.   Neurological:  Negative for dizziness, tingling, focal weakness and headaches.   Endo/Heme/Allergies:  Does not bruise/bleed easily.   Psychiatric/Behavioral:  Negative for depression, hallucinations and suicidal ideas.                Objective:            Past Medical History:   Diagnosis Date    Allergic rhinitis     Diabetes mellitus, type 2     History of colon polyps     Hyperlipidemia     Psoriatic arthritis      Family History   Problem Relation Name Age of Onset    Heart disease Mother      Diabetes Mother      Brain cancer Father          Glioblastoma     Psoriasis Father      Thyroid disease Sister          hypothyroidism     Diabetes Sister      Psoriasis Sister      Cerebral palsy Daughter      Heart disease Daughter      Diabetes Daughter      No Known Problems Son      Stroke Maternal Grandmother      Diabetes Maternal Grandmother      Glaucoma Maternal Grandmother      Diabetes Maternal Grandfather      Ovarian cancer Paternal Grandmother      Psoriasis Paternal Grandmother      Pancreatic cancer Paternal Grandfather      No Known Problems Sister      Psoriasis Brother      Macular degeneration Neg Hx      Eczema Neg Hx      Lupus Neg Hx      Melanoma Neg Hx       Social History     Tobacco Use    Smoking status: Former     Current packs/day: 0.00     Average packs/day: 1 pack/day for 40.0 years (40.0 ttl pk-yrs)     Types: Cigarettes     Start date: 1979     Quit date: 2019     Years since quittin.9    Smokeless tobacco: Never    Tobacco comments:     former smoker   Substance Use Topics    Alcohol use: Never    Drug use: Never         Current Outpatient Medications on File Prior to Visit   Medication Sig Dispense Refill    alendronate (FOSAMAX) 70 MG tablet Take 1 tablet (70 mg total) by mouth every 7 days. 12 tablet 3    ascorbic acid, vitamin C, (VITAMIN C) 500 MG tablet Take 500 mg by mouth once daily. (Patient not  taking: Reported on 1/2/2025)      aspirin 81 MG Chew Take 81 mg by mouth once daily.      blood sugar diagnostic (FREESTYLE LITE STRIPS) Strp TEST BLOOD SUGAR TWICE DAILY 200 strip 3    cetirizine (ZYRTEC) 10 MG tablet Take 1 tablet (10 mg total) by mouth once daily. 90 tablet 3    cholecalciferol, vitamin D3, (VITAMIN D3) 50 mcg (2,000 unit) Cap capsule Take 2,000 Units by mouth.      clobetasol 0.05% (TEMOVATE) 0.05 % Oint Clobetasol propionate 0.05% once daily at night for 4 weeks, then alternate nights for 4 weeks, and then twice weekly for 4 weeks (Patient not taking: Reported on 1/2/2025) 45 g 2    famotidine (PEPCID) 40 MG tablet TAKE 1 TABLET DAILY 90 tablet 3    fluticasone propionate (FLONASE) 50 mcg/actuation nasal spray 1 spray (50 mcg total) by Each Nostril route once daily. 16 g 3    folic acid (FOLVITE) 1 MG tablet Take 1 tablet (1 mg total) by mouth once daily. 100 tablet 3    methotrexate 2.5 MG Tab TAKE 4 TABLETS EVERY 7 DAYS 48 tablet 1    pantoprazole (PROTONIX) 40 MG tablet TAKE 1 TABLET DAILY 90 tablet 3    rosuvastatin (CRESTOR) 5 MG tablet TAKE 1 TABLET EVERY EVENING 90 tablet 3    sulfacetamide sodium-sulfur 10-5 % (w/w) Clsr Use to wash face daily 340 g 11    tirzepatide 7.5 mg/0.5 mL PnIj Inject 7.5 mg into the skin every 7 days. 12 Pen 3    turmeric root extract 500 mg Cap Take 500 mg by mouth once daily at 6am.      ustekinumab (STELARA) 45 mg/0.5 mL Syrg syringe AT THE START OF THERAPY, INJECT 45 MG UNDER THE SKIN ON WEEK 0 AND WEEK 4, THEN EVERY 12 WEEKS THEREAFTER 0.5 mL 3    ustekinumab (STELARA) 90 mg/mL Syrg syringe Inject 1 mL (90 mg total) into the skin every 12 weeks. 1 mL 4    vitamin B complex (SUPER B COMPLEX-B-12 ORAL)        No current facility-administered medications on file prior to visit.       There were no vitals filed for this visit.      Physical Exam:    Physical Exam  Constitutional:       Appearance: She is well-developed.   Eyes:      General: Lids are normal.    Skin:     Comments: Flare psoriasis EAC bilaterally with prominent scale and erythema.    Neurological:      Mental Status: She is oriented to person, place, and time.   Psychiatric:         Behavior: Behavior normal.         Thought Content: Thought content normal.     There is currently no information documented on the homunculus. Go to the Rheumatology activity and complete the homunculus joint exam.        Assessment:       Encounter Diagnoses   Name Primary?    Psoriatic arthritis Yes    Scalp psoriasis     Psoriasis     Immunosuppression due to drug therapy     High risk medications (not anticoagulants) long-term use           Plan:        Psoriatic arthritis  -     ALT (SGPT); Standing  -     AST (SGOT); Standing  -     CBC Auto Differential; Standing  -     C-Reactive Protein; Standing  -     Creatinine, Serum; Standing  -     Sedimentation rate; Standing  -     methotrexate 2.5 MG Tab; TAKE 4 TABLETS EVERY 7 DAYS  Dispense: 48 tablet; Refill: 1  -     folic acid (FOLVITE) 1 MG tablet; Take 1 tablet (1 mg total) by mouth once daily.  Dispense: 100 tablet; Refill: 3    Scalp psoriasis  -     methotrexate 2.5 MG Tab; TAKE 4 TABLETS EVERY 7 DAYS  Dispense: 48 tablet; Refill: 1  -     folic acid (FOLVITE) 1 MG tablet; Take 1 tablet (1 mg total) by mouth once daily.  Dispense: 100 tablet; Refill: 3    Psoriasis  -     methotrexate 2.5 MG Tab; TAKE 4 TABLETS EVERY 7 DAYS  Dispense: 48 tablet; Refill: 1  -     folic acid (FOLVITE) 1 MG tablet; Take 1 tablet (1 mg total) by mouth once daily.  Dispense: 100 tablet; Refill: 3    Immunosuppression due to drug therapy  -     ALT (SGPT); Standing  -     AST (SGOT); Standing  -     CBC Auto Differential; Standing  -     C-Reactive Protein; Standing  -     Creatinine, Serum; Standing  -     Sedimentation rate; Standing    High risk medications (not anticoagulants) long-term use  -     ALT (SGPT); Standing  -     AST (SGOT); Standing  -     CBC Auto Differential;  Standing  -     C-Reactive Protein; Standing  -     Creatinine, Serum; Standing  -     Sedimentation rate; Standing         Patient with long standing PsA:    -Started Stelara 9/2022 [facility]   [facility]     Enbrel without adverse side effects no mention exact type.  Humira was loss of back efficacy over time,  Cimzia no benefit.   Otezla 30 mg once daily (6044-8374)- losing efficacy  Celebrex but developed a  gastritis despite this.  Cosentyx 300mg monthly still with rising Rapid 3 and skin changes.     Continue Stelara- 9/2024 doing .   Continue Methotrexate. She is to take 4 tablets ONE TIME WEEKLY. Folic acid is one tablet daily  Labs in 3 months.     Regarding chronic right GTB if persists I will have Dr. Lofton consider US guided injection vs SIJ inject and did discuss Tenex. Right hip is not that painful right now will continue to just monitor.      No follow-ups on file.            The patient location is: [facility]  The chief complaint leading to consultation is: medication management and f/u   Visit type: Virtual visit with synchronous audio and video  Total time spent with patient: 40min consultation with greater than 50% of that time included Preparing to see the patient (review records, tests), Obtaining and/or reviewing separately obtained historical data, Performing a medically appropriate examination and/or evaluation , Ordering medications, tests, and/or procedures, Referring and communicating with other healthcare professionals , Documenting clinical information in the electronic or other health record and Independently interpreting results  (as warranted) & communicating results to the patient/family/caregiver. All questions answered.    Each patient to whom he or she provides medical services by telemedicine is:  (1) informed of the relationship between the physician and patient and the respective role of any other health care provider with respect to management of the  patient; and (2) notified that he or she may decline to receive medical services by telemedicine and may withdraw from such care at any time.    Notes:

## 2025-02-04 ENCOUNTER — PATIENT MESSAGE (OUTPATIENT)
Dept: FAMILY MEDICINE | Facility: CLINIC | Age: 65
End: 2025-02-04
Payer: MEDICARE

## 2025-02-04 DIAGNOSIS — E11.9 TYPE 2 DIABETES MELLITUS WITHOUT RETINOPATHY: ICD-10-CM

## 2025-02-04 DIAGNOSIS — E11.9 TYPE 2 DIABETES MELLITUS WITHOUT COMPLICATION, WITHOUT LONG-TERM CURRENT USE OF INSULIN: ICD-10-CM

## 2025-02-05 NOTE — TELEPHONE ENCOUNTER
No care due was identified.  [facility] Embedded Care Due Messages. Reference number: [id number].   2/05/2025 7:32:33 AM CST

## 2025-02-12 DIAGNOSIS — K21.9 GASTROESOPHAGEAL REFLUX DISEASE WITHOUT ESOPHAGITIS: ICD-10-CM

## 2025-02-13 RX ORDER — PANTOPRAZOLE SODIUM 40 MG/1
TABLET, DELAYED RELEASE ORAL
Qty: 90 TABLET | Refills: 3 | Status: SHIPPED | OUTPATIENT
Start: 2025-02-13

## 2025-02-13 RX ORDER — FAMOTIDINE 40 MG/1
40 TABLET, FILM COATED ORAL
Qty: 90 TABLET | Refills: 3 | Status: SHIPPED | OUTPATIENT
Start: 2025-02-13

## 2025-02-13 NOTE — TELEPHONE ENCOUNTER
Refill Decision Note   Charla Celina  is requesting a refill authorization.  Brief Assessment and Rationale for Refill:  Approve     Medication Therapy Plan:         Pharmacist review requested: Yes   Comments:     Note composed:10:57 AM 02/13/2025

## 2025-02-13 NOTE — TELEPHONE ENCOUNTER
No care due was identified.  [facility] Embedded Care Due Messages. Reference number: [id number].   2/12/2025 6:46:19 PM CST

## 2025-02-13 NOTE — TELEPHONE ENCOUNTER
Refill Routing Note   Medication(s) are not appropriate for processing by [facility] for the following reason(s):     DDI not previously overridden by current provider--after initial override, the Refill Center will be able to continue overrides      Drug-disease interaction: famotidine and Chronic kidney disease, stage 3a   Drug-drug interaction: famotidine, patoprazole    [facility] action(s):  Defer           Pharmacist review requested: Yes     Appointments  past 12m or future 3m with PCP    Date Provider   Last Visit   1/2/2025 Deyvi Vela MD   Next Visit   4/1/2025 Deyvi Vela MD   ED visits in past 90 days: 0        Note composed:9:30 AM 02/13/2025

## 2025-02-25 ENCOUNTER — LAB VISIT (OUTPATIENT)
Dept: LAB | Facility: HOSPITAL | Age: 65
End: 2025-02-25
Attending: INTERNAL MEDICINE
Payer: MEDICARE

## 2025-02-25 DIAGNOSIS — E11.9 TYPE 2 DIABETES MELLITUS WITHOUT RETINOPATHY: ICD-10-CM

## 2025-02-25 LAB
ESTIMATED AVG GLUCOSE: 114 MG/DL (ref 68–131)
HBA1C MFR BLD: 5.6 % (ref 4–5.6)

## 2025-02-25 PROCEDURE — 83036 HEMOGLOBIN GLYCOSYLATED A1C: CPT | Performed by: INTERNAL MEDICINE

## 2025-02-25 PROCEDURE — 36415 COLL VENOUS BLD VENIPUNCTURE: CPT | Mod: PO | Performed by: INTERNAL MEDICINE

## 2025-03-01 ENCOUNTER — RESULTS FOLLOW-UP (OUTPATIENT)
Dept: FAMILY MEDICINE | Facility: CLINIC | Age: 65
End: 2025-03-01

## 2025-03-08 ENCOUNTER — PATIENT MESSAGE (OUTPATIENT)
Dept: FAMILY MEDICINE | Facility: CLINIC | Age: 65
End: 2025-03-08
Payer: MEDICARE

## 2025-03-14 ENCOUNTER — PATIENT MESSAGE (OUTPATIENT)
Dept: INFUSION THERAPY | Facility: HOSPITAL | Age: 65
End: 2025-03-14
Payer: MEDICARE

## 2025-03-25 DIAGNOSIS — M81.0 AGE-RELATED OSTEOPOROSIS WITHOUT CURRENT PATHOLOGICAL FRACTURE: Primary | ICD-10-CM

## 2025-03-26 ENCOUNTER — LAB VISIT (OUTPATIENT)
Dept: LAB | Facility: HOSPITAL | Age: 65
End: 2025-03-26
Attending: INTERNAL MEDICINE
Payer: MEDICARE

## 2025-03-26 ENCOUNTER — INFUSION (OUTPATIENT)
Dept: INFUSION THERAPY | Facility: HOSPITAL | Age: 65
End: 2025-03-26
Attending: INTERNAL MEDICINE
Payer: MEDICARE

## 2025-03-26 VITALS
HEIGHT: 65 IN | DIASTOLIC BLOOD PRESSURE: 62 MMHG | HEART RATE: 60 BPM | RESPIRATION RATE: 18 BRPM | BODY MASS INDEX: 29.68 KG/M2 | OXYGEN SATURATION: 100 % | WEIGHT: 178.13 LBS | SYSTOLIC BLOOD PRESSURE: 125 MMHG | TEMPERATURE: 98 F

## 2025-03-26 DIAGNOSIS — M81.0 AGE-RELATED OSTEOPOROSIS WITHOUT CURRENT PATHOLOGICAL FRACTURE: Primary | ICD-10-CM

## 2025-03-26 DIAGNOSIS — M81.0 AGE-RELATED OSTEOPOROSIS WITHOUT CURRENT PATHOLOGICAL FRACTURE: ICD-10-CM

## 2025-03-26 LAB
ALBUMIN SERPL BCP-MCNC: 4 G/DL (ref 3.5–5.2)
ALP SERPL-CCNC: 82 UNIT/L (ref 40–150)
ALT SERPL W/O P-5'-P-CCNC: 31 UNIT/L (ref 10–44)
ANION GAP (OHS): 10 MMOL/L (ref 8–16)
AST SERPL-CCNC: 22 UNIT/L (ref 11–45)
BILIRUB SERPL-MCNC: 0.4 MG/DL (ref 0.1–1)
BUN SERPL-MCNC: 15 MG/DL (ref 8–23)
CALCIUM SERPL-MCNC: 9.3 MG/DL (ref 8.7–10.5)
CHLORIDE SERPL-SCNC: 108 MMOL/L (ref 95–110)
CO2 SERPL-SCNC: 24 MMOL/L (ref 23–29)
CREAT SERPL-MCNC: 0.9 MG/DL (ref 0.5–1.4)
GFR SERPLBLD CREATININE-BSD FMLA CKD-EPI: >60 ML/MIN/1.73/M2
GLUCOSE SERPL-MCNC: 98 MG/DL (ref 70–110)
POTASSIUM SERPL-SCNC: 4 MMOL/L (ref 3.5–5.1)
PROT SERPL-MCNC: 7 GM/DL (ref 6–8.4)
SODIUM SERPL-SCNC: 142 MMOL/L (ref 136–145)

## 2025-03-26 PROCEDURE — 36415 COLL VENOUS BLD VENIPUNCTURE: CPT | Mod: PN

## 2025-03-26 PROCEDURE — 96372 THER/PROPH/DIAG INJ SC/IM: CPT | Mod: PN

## 2025-03-26 PROCEDURE — 63600175 PHARM REV CODE 636 W HCPCS: Mod: JZ,TB,PN | Performed by: INTERNAL MEDICINE

## 2025-03-26 PROCEDURE — 80053 COMPREHEN METABOLIC PANEL: CPT | Mod: PN

## 2025-03-26 RX ADMIN — DENOSUMAB 60 MG: 60 INJECTION SUBCUTANEOUS at 09:03

## 2025-03-26 NOTE — PLAN OF CARE
Pt arrived to clinic today for Prolia injection and tolerated well. No changes throughout therapy. Pt aware of follow up appointments and side effects of drugs. Pt verifies she takes at home Calcium and Vitamin D and no recent major dental work noted. Pt instructed to let dentist know that she is on this shot. Verbalized understanding.  Discharged to home. NAD.

## 2025-03-29 ENCOUNTER — RESULTS FOLLOW-UP (OUTPATIENT)
Dept: FAMILY MEDICINE | Facility: CLINIC | Age: 65
End: 2025-03-29

## 2025-04-01 ENCOUNTER — OFFICE VISIT (OUTPATIENT)
Dept: FAMILY MEDICINE | Facility: CLINIC | Age: 65
End: 2025-04-01
Payer: MEDICARE

## 2025-04-01 VITALS
HEART RATE: 67 BPM | WEIGHT: 170 LBS | BODY MASS INDEX: 28.32 KG/M2 | DIASTOLIC BLOOD PRESSURE: 78 MMHG | OXYGEN SATURATION: 100 % | HEIGHT: 65 IN | SYSTOLIC BLOOD PRESSURE: 100 MMHG

## 2025-04-01 DIAGNOSIS — I25.10 CORONARY ARTERY CALCIFICATION SEEN ON CAT SCAN: ICD-10-CM

## 2025-04-01 DIAGNOSIS — M81.0 AGE-RELATED OSTEOPOROSIS WITHOUT CURRENT PATHOLOGICAL FRACTURE: ICD-10-CM

## 2025-04-01 DIAGNOSIS — R10.32 LEFT LOWER QUADRANT ABDOMINAL PAIN: Primary | ICD-10-CM

## 2025-04-01 DIAGNOSIS — K76.0 FATTY LIVER: ICD-10-CM

## 2025-04-01 DIAGNOSIS — E78.2 MIXED HYPERLIPIDEMIA: ICD-10-CM

## 2025-04-01 DIAGNOSIS — E11.59 TYPE 2 DIABETES MELLITUS WITH OTHER CIRCULATORY COMPLICATION, WITHOUT LONG-TERM CURRENT USE OF INSULIN: ICD-10-CM

## 2025-04-01 DIAGNOSIS — L40.50 PSORIATIC ARTHRITIS: ICD-10-CM

## 2025-04-01 DIAGNOSIS — Z87.891 HISTORY OF NICOTINE DEPENDENCE: ICD-10-CM

## 2025-04-01 DIAGNOSIS — K76.89 BENIGN LIVER CYST: ICD-10-CM

## 2025-04-01 PROCEDURE — 1160F RVW MEDS BY RX/DR IN RCRD: CPT | Mod: CPTII,S$GLB,, | Performed by: INTERNAL MEDICINE

## 2025-04-01 PROCEDURE — 1101F PT FALLS ASSESS-DOCD LE1/YR: CPT | Mod: CPTII,S$GLB,, | Performed by: INTERNAL MEDICINE

## 2025-04-01 PROCEDURE — 3008F BODY MASS INDEX DOCD: CPT | Mod: CPTII,S$GLB,, | Performed by: INTERNAL MEDICINE

## 2025-04-01 PROCEDURE — 3044F HG A1C LEVEL LT 7.0%: CPT | Mod: CPTII,S$GLB,, | Performed by: INTERNAL MEDICINE

## 2025-04-01 PROCEDURE — 3288F FALL RISK ASSESSMENT DOCD: CPT | Mod: CPTII,S$GLB,, | Performed by: INTERNAL MEDICINE

## 2025-04-01 PROCEDURE — 3074F SYST BP LT 130 MM HG: CPT | Mod: CPTII,S$GLB,, | Performed by: INTERNAL MEDICINE

## 2025-04-01 PROCEDURE — 3078F DIAST BP <80 MM HG: CPT | Mod: CPTII,S$GLB,, | Performed by: INTERNAL MEDICINE

## 2025-04-01 PROCEDURE — G2211 COMPLEX E/M VISIT ADD ON: HCPCS | Mod: S$GLB,,, | Performed by: INTERNAL MEDICINE

## 2025-04-01 PROCEDURE — 1159F MED LIST DOCD IN RCRD: CPT | Mod: CPTII,S$GLB,, | Performed by: INTERNAL MEDICINE

## 2025-04-01 PROCEDURE — 99999 PR PBB SHADOW E&M-EST. PATIENT-LVL IV: CPT | Mod: PBBFAC,,, | Performed by: INTERNAL MEDICINE

## 2025-04-01 PROCEDURE — 99214 OFFICE O/P EST MOD 30 MIN: CPT | Mod: S$GLB,,, | Performed by: INTERNAL MEDICINE

## 2025-04-01 NOTE — PROGRESS NOTES
[facility]  Primary Care   [address] [facility] [address]       Patient ID: Charla Patrick     Chief Complaint:   Chief Complaint   Patient presents with    Wellness        HPI:  Routine follow-up and overall I think she is doing well.  She continues to lose weight with the Mounjaro and your A1c is very well-controlled at 5.6.  Amazingly her kidney function is improved to normal.  We will have a plan to repeat more labs and see each other again in 6 months.  We did try Fosamax for her osteoporosis but it flared her arthritis so we abandoned that and pivoted to Prolia.  She had 1 shot in his doing well so I hope this will allow her to continue to do well.  I think the Fosamax may have aggravated her stomach as well but she still does take pantoprazole and Pepcid daily.  She has a history of hysterectomy as he young lady with subsequent oophorectomy needed for an emergence of endometriosis.  Her leftover he was actually adhered to her sigmoid but thankfully it was able to be removed.  Since then she will have bouts of left lower quadrant abdominal pain and while it does not clinically appear that she has a bowel obstruction I do think an appointment with GI is warranted.  She is about a year way from a colonoscopy so they may elect to do 1 sooner but I am going to defer that to them.  She is due for a low-dose lung CT which we will get sometime soon.  A previous CT showed small and stable pulmonary nodules that are also picked up some coronary calcifications but thankfully a stress test in the past was negative and a kidney stone in the left.  She is having some right mid back pain so we all wonder if she is developing a kidney stone in the right and that is CT be very telling.    Review of Systems       Left Lower Quadrant abdominal pain     Objective:      Physical Exam   Physical Exam       Normal    Vitals:   Vitals:    04/01/25 0813   BP: 100/78   Pulse: 67   SpO2: 100%   Weight: 77.1 kg (169 lb 15.6  "oz)   Height: 5' 5" (1.651 m)        Assessment:           Plan:       Charla Patrick  was seen today for follow-up and may need lab work.    Diagnoses and all orders for this visit:    Charla Carr" was seen today for wellness.    Diagnoses and all orders for this visit:    Left lower quadrant abdominal pain  -     Ambulatory referral/consult to Gastroenterology; Future  We will refer to GI and could have a component of IBS.  I invite her to keep well hydrated and continue a probiotic.    Fatty liver  Losing weight    Benign liver cyst  Eventual biopsy of liver cyst prove that it was just a cyst    Age-related osteoporosis without current pathological fracture  Continue Prolia and monitor DEXA scan in 2 years    Type 2 diabetes mellitus with other circulatory complication, without long-term current use of insulin  -     Hemoglobin A1C; Future  -     Microalbumin/Creatinine Ratio, Urine; Future  Controlled with Mounjaro    Psoriatic arthritis  -     Comprehensive Metabolic Panel; Standing  -     CBC Auto Differential; Standing  Plan per Dr. French with Rheumatology    Coronary artery calcification seen on CAT scan  Lipids are controlled with rosuvastatin as she does take an aspirin    Mixed hyperlipidemia  -     Lipid Panel; Future  Controlled with rosuvastatin    History of nicotine dependence  -     CT Chest Lung Screening Low Dose; Future  Monitor lung CT    Visit today included increased complexity associated with the care of the episodic problem diabetes hyperlipidemia abdominal pain osteoporosis addressed and managing the longitudinal care of the patient due to the serious and/or complex managed problem(s) .           Deyvi Vela MD    "

## 2025-04-05 ENCOUNTER — HOSPITAL ENCOUNTER (OUTPATIENT)
Dept: RADIOLOGY | Facility: HOSPITAL | Age: 65
Discharge: HOME | End: 2025-04-05
Attending: INTERNAL MEDICINE
Payer: MEDICARE

## 2025-04-05 DIAGNOSIS — Z87.891 HISTORY OF NICOTINE DEPENDENCE: ICD-10-CM

## 2025-04-05 PROCEDURE — 71271 CT THORAX LUNG CANCER SCR C-: CPT | Mod: TC,PO

## 2025-04-05 PROCEDURE — 71271 CT THORAX LUNG CANCER SCR C-: CPT | Mod: 26,,, | Performed by: STUDENT IN AN ORGANIZED HEALTH CARE EDUCATION/TRAINING PROGRAM

## 2025-04-07 ENCOUNTER — RESULTS FOLLOW-UP (OUTPATIENT)
Dept: FAMILY MEDICINE | Facility: CLINIC | Age: 65
End: 2025-04-07

## 2025-04-07 DIAGNOSIS — J43.8 OTHER EMPHYSEMA: Primary | ICD-10-CM

## 2025-04-07 DIAGNOSIS — R91.8 LUNG NODULES: ICD-10-CM

## 2025-04-08 ENCOUNTER — PATIENT MESSAGE (OUTPATIENT)
Dept: HEMATOLOGY/ONCOLOGY | Facility: CLINIC | Age: 65
End: 2025-04-08
Payer: MEDICARE

## 2025-04-11 ENCOUNTER — LAB VISIT (OUTPATIENT)
Dept: LAB | Facility: HOSPITAL | Age: 65
End: 2025-04-11
Attending: INTERNAL MEDICINE
Payer: MEDICARE

## 2025-04-11 ENCOUNTER — OFFICE VISIT (OUTPATIENT)
Dept: GASTROENTEROLOGY | Facility: CLINIC | Age: 65
End: 2025-04-11
Payer: MEDICARE

## 2025-04-11 VITALS — HEIGHT: 65 IN | WEIGHT: 172.38 LBS | BODY MASS INDEX: 28.72 KG/M2

## 2025-04-11 DIAGNOSIS — K76.0 FATTY LIVER: ICD-10-CM

## 2025-04-11 DIAGNOSIS — R14.2 BELCHING: ICD-10-CM

## 2025-04-11 DIAGNOSIS — K57.90 DIVERTICULOSIS: ICD-10-CM

## 2025-04-11 DIAGNOSIS — R19.4 CHANGE IN BOWEL HABITS: ICD-10-CM

## 2025-04-11 DIAGNOSIS — K59.00 CONSTIPATION, UNSPECIFIED CONSTIPATION TYPE: ICD-10-CM

## 2025-04-11 DIAGNOSIS — R10.32 LEFT LOWER QUADRANT PAIN: ICD-10-CM

## 2025-04-11 DIAGNOSIS — Z79.899 IMMUNOSUPPRESSION DUE TO DRUG THERAPY: ICD-10-CM

## 2025-04-11 DIAGNOSIS — Z79.899 LONG-TERM CURRENT USE OF PROTON PUMP INHIBITOR THERAPY: ICD-10-CM

## 2025-04-11 DIAGNOSIS — Z79.899 HIGH RISK MEDICATIONS (NOT ANTICOAGULANTS) LONG-TERM USE: ICD-10-CM

## 2025-04-11 DIAGNOSIS — R10.32 LEFT LOWER QUADRANT PAIN: Primary | ICD-10-CM

## 2025-04-11 DIAGNOSIS — L40.50 PSORIATIC ARTHRITIS: ICD-10-CM

## 2025-04-11 DIAGNOSIS — D84.821 IMMUNOSUPPRESSION DUE TO DRUG THERAPY: ICD-10-CM

## 2025-04-11 DIAGNOSIS — R10.32 LEFT LOWER QUADRANT ABDOMINAL PAIN: ICD-10-CM

## 2025-04-11 DIAGNOSIS — K21.9 GASTROESOPHAGEAL REFLUX DISEASE, UNSPECIFIED WHETHER ESOPHAGITIS PRESENT: ICD-10-CM

## 2025-04-11 DIAGNOSIS — K76.89 LIVER CYST: ICD-10-CM

## 2025-04-11 LAB
CREAT SERPL-MCNC: 0.9 MG/DL (ref 0.5–1.4)
ERYTHROCYTE [SEDIMENTATION RATE] IN BLOOD BY PHOTOMETRIC METHOD: 6 MM/HR
GFR SERPLBLD CREATININE-BSD FMLA CKD-EPI: >60 ML/MIN/1.73/M2
MAGNESIUM SERPL-MCNC: 2.2 MG/DL (ref 1.6–2.6)
VIT B12 SERPL-MCNC: 1266 PG/ML (ref 210–950)

## 2025-04-11 PROCEDURE — 85652 RBC SED RATE AUTOMATED: CPT

## 2025-04-11 PROCEDURE — 83735 ASSAY OF MAGNESIUM: CPT

## 2025-04-11 PROCEDURE — 82565 ASSAY OF CREATININE: CPT

## 2025-04-11 PROCEDURE — 36415 COLL VENOUS BLD VENIPUNCTURE: CPT | Mod: PO

## 2025-04-11 PROCEDURE — 99999 PR PBB SHADOW E&M-EST. PATIENT-LVL V: CPT | Mod: PBBFAC,,,

## 2025-04-11 PROCEDURE — 82607 VITAMIN B-12: CPT

## 2025-04-11 NOTE — PROGRESS NOTES
Subjective:       Patient ID: Charla Patrick is a 65 y.o. female Body mass index is 28.69 kg/m².    Chief Complaint: Abdominal Pain    This patient is new to me.  Referring Provider: Dr. Deyvi Vela for LLQ pain.  Former patient of Dr. Valenzuela.     Abdominal Pain  This is a recurrent (hx of pain in the past that resolved after hystertomy, but has reoccurred) problem. The current episode started more than 1 month ago (started the last 6 months). The onset quality is sudden. The problem occurs intermittently. Duration: lasts 15-20 minutes - pain so severe wakes her causing diaphoresis. The problem has been waxing and waning. The pain is located in the LLQ. The pain is at a severity of 0/10 (Denies pain currently). The patient is experiencing no pain. The quality of the pain is cramping. The abdominal pain radiates to the suprapubic region and RLQ. Associated symptoms include belching and constipation (started 08/2024 - currently having 1 BM every 2 days rated stool 3 on Antelope scale; straining at times and does not feel complete after BMs; alendronate caused constipation, which was discontinued and alternative Prolia was started). Pertinent negatives include no diarrhea, dysuria, fever, hematochezia, hematuria, melena, nausea or vomiting. The pain is aggravated by eating and palpation. The pain is relieved by Bowel movements. She has tried proton pump inhibitors and H2 blockers (Currently taking Protonix 40 mg once daily and famotidine 40 mg nightly) for the symptoms. Prior diagnostic workup includes GI consult. Her past medical history is significant for abdominal surgery (S/p appendectomy) and GERD (hx of GERD and frequent belching; currently taking Protonix 40 mg once daily and famotidine 40 mg nightly). There is no history of colon cancer, Crohn's disease, irritable bowel syndrome, pancreatitis, PUD or ulcerative colitis. Patient's medical history does not include kidney stones and UTI.     Review of Systems  "  Constitutional:  Negative for activity change, appetite change, chills, diaphoresis, fatigue and fever.   HENT:  Negative for sore throat and trouble swallowing.    Respiratory:  Negative for cough, choking and shortness of breath.    Cardiovascular:  Negative for chest pain.   Gastrointestinal:  Positive for abdominal pain and constipation (started 08/2024 - currently having 1 BM every 2 days rated stool 3 on Marion scale; straining at times and does not feel complete after BMs; alendronate caused constipation, which was discontinued and alternative Prolia was started). Negative for abdominal distention, anal bleeding, blood in stool, diarrhea, hematochezia, melena, nausea, rectal pain and vomiting.   Genitourinary:  Negative for dysuria and hematuria.       No LMP recorded. Patient has had a hysterectomy.  Past Medical History:   Diagnosis Date    Allergic rhinitis     Diabetes mellitus, type 2     Diverticulosis     GERD (gastroesophageal reflux disease)     History of colon polyps     Hyperlipidemia     Psoriatic arthritis      Past Surgical History:   Procedure Laterality Date    APPENDECTOMY      BREAST LUMPECTOMY      BREAST SURGERY      left, "benign tumor"    COLONOSCOPY N/A 08/10/2021    Procedure: COLONOSCOPY  Banding of hemorrhoids possible;  Surgeon: Faizan Mejia MD;  Location: [facility];  Service: Endoscopy;  Laterality: N/A;    FOOT SURGERY Bilateral     plantar fasciitis and neuroma by Pangburn    HYSTERECTOMY      OOPHORECTOMY       Family History   Problem Relation Name Age of Onset    Heart disease Mother      Diabetes Mother      Brain cancer Father          Glioblastoma     Psoriasis Father      Thyroid disease Sister          hypothyroidism     Diabetes Sister      Psoriasis Sister      No Known Problems Sister      Psoriasis Brother      Stroke Maternal Grandmother      Diabetes Maternal Grandmother      Glaucoma Maternal Grandmother      Diabetes Maternal Grandfather      Ovarian cancer " Paternal Grandmother      Psoriasis Paternal Grandmother      Pancreatic cancer Paternal Grandfather      Cerebral palsy Daughter      Heart disease Daughter      Diabetes Daughter      No Known Problems Son      Macular degeneration Neg Hx      Eczema Neg Hx      Lupus Neg Hx      Melanoma Neg Hx      Colon cancer Neg Hx       Social History[1]  Wt Readings from Last 10 Encounters:   04/11/25 78.2 kg (172 lb 6.4 oz)   04/01/25 77.1 kg (169 lb 15.6 oz)   03/26/25 80.8 kg (178 lb 2.1 oz)   01/02/25 80.8 kg (178 lb 2.1 oz)   11/20/24 82.8 kg (182 lb 8.7 oz)   10/24/24 82.5 kg (181 lb 14.1 oz)   09/19/24 82.5 kg (181 lb 14.1 oz)   07/17/24 81.2 kg (179 lb 0.2 oz)   05/31/24 81.2 kg (179 lb)   05/17/24 81.3 kg (179 lb 3.7 oz)     Lab Results   Component Value Date    WBC 6.99 01/17/2025    HGB 12.7 01/17/2025    HCT 40.1 01/17/2025    MCV 92 01/17/2025     01/17/2025     CMP  Sodium   Date Value Ref Range Status   03/26/2025 142 136 - 145 mmol/L Final   08/19/2024 140 136 - 145 mmol/L Final     Potassium   Date Value Ref Range Status   03/26/2025 4.0 3.5 - 5.1 mmol/L Final   08/19/2024 4.4 3.5 - 5.1 mmol/L Final     Chloride   Date Value Ref Range Status   03/26/2025 108 95 - 110 mmol/L Final   08/19/2024 109 95 - 110 mmol/L Final     CO2   Date Value Ref Range Status   03/26/2025 24 23 - 29 mmol/L Final   08/19/2024 25 23 - 29 mmol/L Final     Glucose   Date Value Ref Range Status   08/19/2024 118 (H) 70 - 110 mg/dL Final     BUN   Date Value Ref Range Status   03/26/2025 15 8 - 23 mg/dL Final     Creatinine   Date Value Ref Range Status   04/11/2025 0.9 0.5 - 1.4 mg/dL Final     Calcium   Date Value Ref Range Status   03/26/2025 9.3 8.7 - 10.5 mg/dL Final   08/19/2024 9.9 8.7 - 10.5 mg/dL Final     Total Protein   Date Value Ref Range Status   08/19/2024 6.9 6.0 - 8.4 g/dL Final     Albumin   Date Value Ref Range Status   03/26/2025 4.0 3.5 - 5.2 g/dL Final   08/19/2024 3.9 3.5 - 5.2 g/dL Final     Total  Bilirubin   Date Value Ref Range Status   08/19/2024 0.5 0.1 - 1.0 mg/dL Final     Comment:     For infants and newborns, interpretation of results should be based  on gestational age, weight and in agreement with clinical  observations.    Premature Infant recommended reference ranges:  Up to 24 hours.............<8.0 mg/dL  Up to 48 hours............<12.0 mg/dL  3-5 days..................<15.0 mg/dL  6-29 days.................<15.0 mg/dL       Bilirubin Total   Date Value Ref Range Status   03/26/2025 0.4 0.1 - 1.0 mg/dL Final     Comment:     For infants and newborns, interpretation of results should be based   on gestational age, weight and in agreement with clinical   observations.    Premature Infant recommended reference ranges:   0-24 hours:  <8.0 mg/dL   24-48 hours: <12.0 mg/dL   3-5 days:    <15.0 mg/dL   6-29 days:   <15.0 mg/dL     Alkaline Phosphatase   Date Value Ref Range Status   08/19/2024 101 55 - 135 U/L Final     ALP   Date Value Ref Range Status   03/26/2025 82 40 - 150 unit/L Final     AST   Date Value Ref Range Status   03/26/2025 22 11 - 45 unit/L Final   01/17/2025 18 10 - 40 U/L Final     ALT   Date Value Ref Range Status   03/26/2025 31 10 - 44 unit/L Final   01/17/2025 24 10 - 44 U/L Final     Anion Gap   Date Value Ref Range Status   03/26/2025 10 8 - 16 mmol/L Final     eGFR if    Date Value Ref Range Status   04/21/2022 >60.0 >60 mL/min/1.73 m^2 Final     eGFR if non    Date Value Ref Range Status   04/21/2022 >60.0 >60 mL/min/1.73 m^2 Final     Comment:     Calculation used to obtain the estimated glomerular filtration  rate (eGFR) is the CKD-EPI equation.        Lab Results   Component Value Date    TSH 1.62 08/25/2020     Reviewed prior medical records including office visit with Dr. Vela 04/01/25, radiology report of MRI abdomen 02/2024 & endoscopy history (see surgical history).    Objective:      Physical Exam  Vitals and nursing note reviewed.    Constitutional:       General: She is not in acute distress.     Appearance: Normal appearance. She is not ill-appearing.   HENT:      Mouth/Throat:      Lips: Pink. No lesions.   Cardiovascular:      Pulses: Normal pulses.      Heart sounds: Normal heart sounds.   Pulmonary:      Effort: Pulmonary effort is normal. No respiratory distress.      Breath sounds: Normal breath sounds.   Abdominal:      General: Bowel sounds are normal. There is no distension or abdominal bruit. There are no signs of injury.      Palpations: Abdomen is soft. There is no shifting dullness, fluid wave, hepatomegaly, splenomegaly or mass.      Tenderness: There is abdominal tenderness in the left lower quadrant. There is no guarding or rebound. Negative signs include Allison's sign, Rovsing's sign and McBurney's sign.   Skin:     General: Skin is warm and dry.      Coloration: Skin is not jaundiced or pale.   Neurological:      Mental Status: She is alert and oriented to person, place, and time.   Psychiatric:         Attention and Perception: Attention normal.         Mood and Affect: Mood normal.         Speech: Speech normal.         Behavior: Behavior normal.         Assessment:       1. Left lower quadrant pain    2. Diverticulosis    3. Constipation, unspecified constipation type    4. Change in bowel habits    5. Gastroesophageal reflux disease, unspecified whether esophagitis present    6. Belching    7. Fatty liver    8. Liver cyst    9. Long-term current use of proton pump inhibitor therapy            Left lower quadrant pain  - schedule Colonoscopy, discussed procedure with the patient, including risks and benefits, patient verbalized understanding  -     CT Abdomen Pelvis With IV Contrast Routine Oral Contrast; Future; Expected date: 04/11/2025  -     Creatinine, serum; Future; Expected date: 04/11/2025    Diverticulosis  - schedule Colonoscopy, discussed procedure with the patient, including risks and benefits, patient  verbalized understanding  Recommend high fiber diet (20-30 grams of fiber daily)/OTC fiber supplements daily as directed.  -     CT Abdomen Pelvis With IV Contrast Routine Oral Contrast; Future; Expected date: 04/11/2025    Constipation, unspecified constipation type & Change in bowel habits  - schedule Colonoscopy, discussed procedure with the patient, including risks and benefits, patient verbalized understanding  Recommend daily exercise as tolerated, adequate water intake (six 8-oz glasses of water daily), and high fiber diet. OTC fiber supplements are recommended if diet does not reach daily fiber goal (20-30 grams daily), such as Metamucil, Citrucel, or FiberCon (take as directed, separate from other oral medications by >2 hours).  -Recommend taking an OTC stool softener such as Colace as directed to avoid hard stools and straining with bowel movements PRN  -Recommend trying OTC MiraLax once daily (17g PO) as directed  - If no improvement with above recommendations, try intermittently dosed Dulcolax OTC as directed (every 3-4  days) PRN to facilitate bowel movements  -If still no improvement with these measures, call/follow-up    Gastroesophageal reflux disease, unspecified whether esophagitis present & Belching  -Avoid large meals, avoid eating within 2-3 hours of bedtime (avoid late night eating & lying down soon after eating), elevate head of bed if nocturnal symptoms are present, smoking cessation (if current smoker), & weight loss (if overweight).   -Avoid known foods which trigger reflux symptoms & to minimize/avoid high-fat foods, chocolate, caffeine, citrus, alcohol, & tomato products.  -Avoid/limit use of NSAID's, since they can cause GI upset, bleeding, and/or ulcers. If needed, take with food.  - continue Protonix 40 mg once daily 30 minutes to an hour before breakfast and famotidine 40 mg nightly  - consider EGD if symptoms worsen or do not improve    Fatty liver & Liver cyst  For fatty liver  recommend: low fat, low cholesterol diet, maintain good control of blood sugars and cholesterol levels, exercise, weight loss (if overweight), minimize/avoid alcohol and tylenol products  -     Ambulatory referral/consult to Hepatology; Future; Expected date: 2025    Long-term current use of proton pump inhibitor therapy  - preference to use lowest effective dose or discontinuing if possible  - recommend a diet high in calcium and/or taking OTC calcium and vitamin d supplements as directed (such as Citracal +D)  - recommend annual monitoring with blood work to include CMP, CBC, vitamin B12, and magnesium  -     Vitamin B12; Future; Expected date: 2025  -     Magnesium; Future; Expected date: 2025    Follow up in about 2 months (around 2025).      If no improvement in symptoms or symptoms worsen, call/follow-up at clinic or go to ER.        Total time spent on the encounter includes face to face time and non-face to face time preparing to see the patient (eg, review of tests), Obtaining and/or reviewing separately obtained history, Documenting clinical information in the electronic or other health record, Independently interpreting results (not separately reported) and communicating results to the patient/family/caregiver, or Care coordination (not separately reported).     A dictation software program was used for this note. Please expect some simple typographical  errors in this note.         [1]   Social History  Tobacco Use    Smoking status: Former     Current packs/day: 0.00     Average packs/day: 1 pack/day for 40.0 years (40.0 ttl pk-yrs)     Types: Cigarettes     Start date: 1979     Quit date: 2019     Years since quittin.1    Smokeless tobacco: Never    Tobacco comments:     former smoker   Substance Use Topics    Alcohol use: Never    Drug use: Never

## 2025-04-11 NOTE — PATIENT INSTRUCTIONS
Recommend daily exercise as tolerated, adequate water intake (six 8-oz glasses of water daily), and high fiber diet. OTC fiber supplements are recommended if diet does not reach daily fiber goal (20-30 grams daily), such as Metamucil, Citrucel, or FiberCon (take as directed, separate from other oral medications by >2 hours).  -Recommend taking an OTC stool softener such as Colace as directed to avoid hard stools and straining with bowel movements PRN  -Recommend trying OTC MiraLax once daily (17g PO) as directed

## 2025-04-14 ENCOUNTER — RESULTS FOLLOW-UP (OUTPATIENT)
Dept: GASTROENTEROLOGY | Facility: CLINIC | Age: 65
End: 2025-04-14

## 2025-04-14 ENCOUNTER — PATIENT MESSAGE (OUTPATIENT)
Dept: FAMILY MEDICINE | Facility: CLINIC | Age: 65
End: 2025-04-14
Payer: MEDICARE

## 2025-04-14 DIAGNOSIS — K76.9 LIVER LESION, LEFT LOBE: Primary | ICD-10-CM

## 2025-04-18 NOTE — TELEPHONE ENCOUNTER
Patient's  would like me to be his PCP. Please reach out to him. We may need to list me as his PCP prior to the office visit.

## 2025-04-24 ENCOUNTER — LAB VISIT (OUTPATIENT)
Dept: LAB | Facility: HOSPITAL | Age: 65
End: 2025-04-24
Attending: INTERNAL MEDICINE
Payer: MEDICARE

## 2025-04-24 DIAGNOSIS — Z79.899 HIGH RISK MEDICATIONS (NOT ANTICOAGULANTS) LONG-TERM USE: ICD-10-CM

## 2025-04-24 DIAGNOSIS — Z79.899 IMMUNOSUPPRESSION DUE TO DRUG THERAPY: ICD-10-CM

## 2025-04-24 DIAGNOSIS — D84.821 IMMUNOSUPPRESSION DUE TO DRUG THERAPY: ICD-10-CM

## 2025-04-24 DIAGNOSIS — L40.50 PSORIATIC ARTHRITIS: ICD-10-CM

## 2025-04-24 LAB
ABSOLUTE EOSINOPHIL (OHS): 0.12 K/UL
ABSOLUTE MONOCYTE (OHS): 0.55 K/UL (ref 0.3–1)
ABSOLUTE NEUTROPHIL COUNT (OHS): 3.9 K/UL (ref 1.8–7.7)
ALT SERPL W/O P-5'-P-CCNC: 23 UNIT/L (ref 10–44)
AST SERPL-CCNC: 18 UNIT/L (ref 11–45)
BASOPHILS # BLD AUTO: 0.05 K/UL
BASOPHILS NFR BLD AUTO: 0.8 %
CREAT SERPL-MCNC: 0.9 MG/DL (ref 0.5–1.4)
CRP SERPL-MCNC: 3.4 MG/L
ERYTHROCYTE [DISTWIDTH] IN BLOOD BY AUTOMATED COUNT: 14.8 % (ref 11.5–14.5)
ERYTHROCYTE [SEDIMENTATION RATE] IN BLOOD BY PHOTOMETRIC METHOD: 4 MM/HR
GFR SERPLBLD CREATININE-BSD FMLA CKD-EPI: >60 ML/MIN/1.73/M2
HCT VFR BLD AUTO: 39.1 % (ref 37–48.5)
HGB BLD-MCNC: 12.3 GM/DL (ref 12–16)
IMM GRANULOCYTES # BLD AUTO: 0.01 K/UL (ref 0–0.04)
IMM GRANULOCYTES NFR BLD AUTO: 0.2 % (ref 0–0.5)
LYMPHOCYTES # BLD AUTO: 1.71 K/UL (ref 1–4.8)
MCH RBC QN AUTO: 29.5 PG (ref 27–31)
MCHC RBC AUTO-ENTMCNC: 31.5 G/DL (ref 32–36)
MCV RBC AUTO: 94 FL (ref 82–98)
NUCLEATED RBC (/100WBC) (OHS): 0 /100 WBC
PLATELET # BLD AUTO: 216 K/UL (ref 150–450)
PMV BLD AUTO: 11.2 FL (ref 9.2–12.9)
RBC # BLD AUTO: 4.17 M/UL (ref 4–5.4)
RELATIVE EOSINOPHIL (OHS): 1.9 %
RELATIVE LYMPHOCYTE (OHS): 27 % (ref 18–48)
RELATIVE MONOCYTE (OHS): 8.7 % (ref 4–15)
RELATIVE NEUTROPHIL (OHS): 61.4 % (ref 38–73)
WBC # BLD AUTO: 6.34 K/UL (ref 3.9–12.7)

## 2025-04-24 PROCEDURE — 85025 COMPLETE CBC W/AUTO DIFF WBC: CPT

## 2025-04-24 PROCEDURE — 84460 ALANINE AMINO (ALT) (SGPT): CPT

## 2025-04-24 PROCEDURE — 82565 ASSAY OF CREATININE: CPT

## 2025-04-24 PROCEDURE — 85652 RBC SED RATE AUTOMATED: CPT

## 2025-04-24 PROCEDURE — 36415 COLL VENOUS BLD VENIPUNCTURE: CPT | Mod: PO

## 2025-04-24 PROCEDURE — 84450 TRANSFERASE (AST) (SGOT): CPT

## 2025-04-24 PROCEDURE — 86140 C-REACTIVE PROTEIN: CPT

## 2025-05-01 ENCOUNTER — HOSPITAL ENCOUNTER (OUTPATIENT)
Dept: RADIOLOGY | Facility: HOSPITAL | Age: 65
Discharge: HOME | End: 2025-05-01
Payer: MEDICARE

## 2025-05-01 DIAGNOSIS — R10.32 LEFT LOWER QUADRANT PAIN: ICD-10-CM

## 2025-05-01 DIAGNOSIS — K57.90 DIVERTICULOSIS: ICD-10-CM

## 2025-05-01 PROCEDURE — 74177 CT ABD & PELVIS W/CONTRAST: CPT | Mod: 26,,, | Performed by: STUDENT IN AN ORGANIZED HEALTH CARE EDUCATION/TRAINING PROGRAM

## 2025-05-01 PROCEDURE — 25500020 PHARM REV CODE 255: Mod: PO

## 2025-05-01 PROCEDURE — A9698 NON-RAD CONTRAST MATERIALNOC: HCPCS | Mod: PO

## 2025-05-01 PROCEDURE — 74177 CT ABD & PELVIS W/CONTRAST: CPT | Mod: TC,PO

## 2025-05-01 RX ADMIN — IOHEXOL 75 ML: 350 INJECTION, SOLUTION INTRAVENOUS at 08:05

## 2025-05-01 RX ADMIN — BARIUM SULFATE 900 ML: 20 SUSPENSION ORAL at 08:05

## 2025-05-06 RX ORDER — FEXOFENADINE HCL 180 MG/1
180 TABLET ORAL DAILY
COMMUNITY

## 2025-05-14 ENCOUNTER — ANESTHESIA (OUTPATIENT)
Dept: ENDOSCOPY | Facility: HOSPITAL | Age: 65
End: 2025-05-14
Payer: MEDICARE

## 2025-05-14 ENCOUNTER — ANESTHESIA EVENT (OUTPATIENT)
Dept: ENDOSCOPY | Facility: HOSPITAL | Age: 65
End: 2025-05-14
Payer: MEDICARE

## 2025-05-14 ENCOUNTER — HOSPITAL ENCOUNTER (OUTPATIENT)
Facility: HOSPITAL | Age: 65
Discharge: HOME | End: 2025-05-14
Attending: INTERNAL MEDICINE | Admitting: INTERNAL MEDICINE
Payer: MEDICARE

## 2025-05-14 DIAGNOSIS — R10.32 LEFT LOWER QUADRANT PAIN: ICD-10-CM

## 2025-05-14 DIAGNOSIS — R19.4 CHANGE IN BOWEL HABITS: ICD-10-CM

## 2025-05-14 DIAGNOSIS — K59.00 CONSTIPATION, UNSPECIFIED CONSTIPATION TYPE: ICD-10-CM

## 2025-05-14 DIAGNOSIS — Z86.0100 HISTORY OF COLON POLYPS: ICD-10-CM

## 2025-05-14 LAB — GLUCOSE SERPL-MCNC: 112 MG/DL (ref 70–110)

## 2025-05-14 PROCEDURE — 45385 COLONOSCOPY W/LESION REMOVAL: CPT | Mod: PO | Performed by: INTERNAL MEDICINE

## 2025-05-14 PROCEDURE — 88305 TISSUE EXAM BY PATHOLOGIST: CPT | Mod: TC,91 | Performed by: INTERNAL MEDICINE

## 2025-05-14 PROCEDURE — 37000009 HC ANESTHESIA EA ADD 15 MINS: Mod: PO | Performed by: INTERNAL MEDICINE

## 2025-05-14 PROCEDURE — 45385 COLONOSCOPY W/LESION REMOVAL: CPT | Mod: ,,, | Performed by: INTERNAL MEDICINE

## 2025-05-14 PROCEDURE — 82962 GLUCOSE BLOOD TEST: CPT | Mod: PO | Performed by: INTERNAL MEDICINE

## 2025-05-14 PROCEDURE — 63600175 PHARM REV CODE 636 W HCPCS: Mod: PO | Performed by: NURSE ANESTHETIST, CERTIFIED REGISTERED

## 2025-05-14 PROCEDURE — 27201089 HC SNARE, DISP (ANY): Mod: PO | Performed by: INTERNAL MEDICINE

## 2025-05-14 PROCEDURE — 37000008 HC ANESTHESIA 1ST 15 MINUTES: Mod: PO | Performed by: INTERNAL MEDICINE

## 2025-05-14 PROCEDURE — 63600175 PHARM REV CODE 636 W HCPCS: Mod: PO | Performed by: INTERNAL MEDICINE

## 2025-05-14 RX ORDER — PROPOFOL 10 MG/ML
VIAL (ML) INTRAVENOUS
Status: DISCONTINUED | OUTPATIENT
Start: 2025-05-14 | End: 2025-05-14

## 2025-05-14 RX ORDER — LIDOCAINE HYDROCHLORIDE 20 MG/ML
INJECTION INTRAVENOUS
Status: DISCONTINUED | OUTPATIENT
Start: 2025-05-14 | End: 2025-05-14

## 2025-05-14 RX ORDER — SODIUM CHLORIDE 0.9 % (FLUSH) 0.9 %
10 SYRINGE (ML) INJECTION
Status: DISCONTINUED | OUTPATIENT
Start: 2025-05-14 | End: 2025-05-14 | Stop reason: HOSPADM

## 2025-05-14 RX ORDER — SODIUM CHLORIDE, SODIUM LACTATE, POTASSIUM CHLORIDE, CALCIUM CHLORIDE 600; 310; 30; 20 MG/100ML; MG/100ML; MG/100ML; MG/100ML
INJECTION, SOLUTION INTRAVENOUS CONTINUOUS
Status: DISCONTINUED | OUTPATIENT
Start: 2025-05-14 | End: 2025-05-14 | Stop reason: HOSPADM

## 2025-05-14 RX ADMIN — PROPOFOL 50 MG: 10 INJECTION, EMULSION INTRAVENOUS at 08:05

## 2025-05-14 RX ADMIN — PROPOFOL 120 MG: 10 INJECTION, EMULSION INTRAVENOUS at 08:05

## 2025-05-14 RX ADMIN — LIDOCAINE HYDROCHLORIDE 75 MG: 20 INJECTION INTRAVENOUS at 08:05

## 2025-05-14 RX ADMIN — SODIUM CHLORIDE, POTASSIUM CHLORIDE, SODIUM LACTATE AND CALCIUM CHLORIDE: 600; 310; 30; 20 INJECTION, SOLUTION INTRAVENOUS at 07:05

## 2025-05-14 NOTE — TRANSFER OF CARE
"Anesthesia Transfer of Care Note    Patient: Charla Patrick    Procedure(s) Performed: Procedure(s) (LRB):  COLONOSCOPY (N/A)    Patient location: PACU    Anesthesia Type: general    Transport from OR: Transported from OR on room air with adequate spontaneous ventilation    Post pain: adequate analgesia    Post assessment: no apparent anesthetic complications    Post vital signs: stable    Level of consciousness: awake and sedated    Nausea/Vomiting: no nausea/vomiting    Complications: none    Transfer of care protocol was followed      Last vitals: Visit Vitals  /67 (BP Location: Right arm, Patient Position: Lying)   Pulse 64   Temp 36.4 °C (97.5 °F) (Skin)   Resp 15   Ht 5' 5" (1.651 m)   Wt 79.4 kg (175 lb)   SpO2 97%   Breastfeeding No   BMI 29.12 kg/m²     "

## 2025-05-14 NOTE — ANESTHESIA POSTPROCEDURE EVALUATION
Anesthesia Post Evaluation    Patient: Charla Patrick    Procedure(s) Performed: Procedure(s) (LRB):  COLONOSCOPY (N/A)    Final Anesthesia Type: general      Patient location during evaluation: PACU  Patient participation: Yes- Able to Participate  Level of consciousness: awake and alert and oriented  Post-procedure vital signs: reviewed and stable  Pain management: adequate  Airway patency: patent    PONV status at discharge: No PONV  Anesthetic complications: no      Cardiovascular status: blood pressure returned to baseline  Respiratory status: unassisted, spontaneous ventilation and room air  Hydration status: euvolemic  Follow-up not needed.              Vitals Value Taken Time   /64 05/14/25 08:45   Temp 36.4 °C (97.5 °F) 05/14/25 08:32   Pulse 63 05/14/25 08:45   Resp 16 05/14/25 08:45   SpO2 98 % 05/14/25 08:45         No case tracking events are documented in the log.      Pain/Thaddeus Score: Thaddeus Score: 9 (5/14/2025  8:33 AM)

## 2025-05-14 NOTE — ANESTHESIA PREPROCEDURE EVALUATION
05/14/2025  Charla Patrick is a 65 y.o., female.      Pre-op Assessment    I have reviewed the NPO Status.   I have reviewed the Medications.     Review of Systems  Anesthesia Hx:  No problems with previous Anesthesia                Cardiovascular:  Exercise tolerance: good      CAD           hyperlipidemia         Coronary Artery Disease:                                  Pulmonary:   COPD         Chronic Obstructive Pulmonary Disease (COPD):                      Renal/:  Chronic Renal Disease, CKD        Kidney Function/Disease             Hepatic/GI:     GERD Liver Disease,        Gerd       Liver Disease        Endocrine:  Diabetes    Diabetes                    Obesity / BMI > 30      Physical Exam  General: Well nourished    Airway:  Mallampati: II   Mouth Opening: Normal  TM Distance: Normal  Tongue: Normal  Neck ROM: Normal ROM    Dental:  Intact    Chest/Lungs:  Clear to auscultation, Normal Respiratory Rate        Anesthesia Plan  Type of Anesthesia, risks & benefits discussed:    Anesthesia Type: Gen Natural Airway  Intra-op Monitoring Plan: Standard ASA Monitors  Induction:  IV  Informed Consent: Informed consent signed with the Patient and all parties understand the risks and agree with anesthesia plan.  All questions answered.   ASA Score: 3    Ready For Surgery From Anesthesia Perspective.     .

## 2025-05-14 NOTE — DISCHARGE SUMMARY
[facility]  Discharge Note  Short Stay  Discharge Note  Short Stay      SUMMARY     Admit Date: 5/14/2025    Attending Physician: Cristi Vee MD     Discharge Physician: Cristi Vee MD    Discharge Date: 5/14/2025 8:35 AM    Final Diagnosis: Constipation, unspecified constipation type [K59.00]  Change in bowel habits [R19.4]  Left lower quadrant pain [R10.32]  History of colon polyps [Z86.0100]    Disposition: HOME OR SELF CARE    Patient Instructions:   Current Discharge Medication List        CONTINUE these medications which have NOT CHANGED    Details   ascorbic acid, vitamin C, (VITAMIN C) 500 MG tablet Take 500 mg by mouth once daily.      aspirin 81 MG Chew Take 81 mg by mouth once daily.      cholecalciferol, vitamin D3, (VITAMIN D3) 50 mcg (2,000 unit) Cap capsule Take 2,000 Units by mouth.      famotidine (PEPCID) 40 MG tablet TAKE 1 TABLET DAILY  Qty: 90 tablet, Refills: 3      fexofenadine (ALLEGRA) 180 MG tablet Take 180 mg by mouth once daily.      folic acid (FOLVITE) 1 MG tablet Take 1 tablet (1 mg total) by mouth once daily.  Qty: 100 tablet, Refills: 3    Associated Diagnoses: Psoriatic arthritis; Scalp psoriasis; Psoriasis      methotrexate 2.5 MG Tab TAKE 4 TABLETS EVERY 7 DAYS  Qty: 48 tablet, Refills: 1    Associated Diagnoses: Psoriatic arthritis; Scalp psoriasis; Psoriasis      pantoprazole (PROTONIX) 40 MG tablet TAKE 1 TABLET DAILY  Qty: 90 tablet, Refills: 3    Associated Diagnoses: Gastroesophageal reflux disease without esophagitis      rosuvastatin (CRESTOR) 5 MG tablet TAKE 1 TABLET EVERY EVENING  Qty: 90 tablet, Refills: 3    Associated Diagnoses: Other hyperlipidemia      tirzepatide 7.5 mg/0.5 mL PnIj Inject 7.5 mg into the skin every 7 days.  Qty: 12 Pen, Refills: 3    Associated Diagnoses: Type 2 diabetes mellitus without complication, without long-term current use of insulin      turmeric root extract 500 mg Cap Take 500 mg by mouth once daily at 6am.       vitamin B complex (SUPER B COMPLEX-B-12 ORAL)       blood sugar diagnostic (FREESTYLE LITE STRIPS) Strp TEST BLOOD SUGAR TWICE DAILY  Qty: 200 strip, Refills: 3    Comments: May substitute with insurance or patient preferred brand  Associated Diagnoses: Type 2 diabetes mellitus without complication, without long-term current use of insulin      cetirizine (ZYRTEC) 10 MG tablet Take 1 tablet (10 mg total) by mouth once daily.  Qty: 90 tablet, Refills: 3    Associated Diagnoses: Non-seasonal allergic rhinitis due to pollen      clobetasol 0.05% (TEMOVATE) 0.05 % Oint Clobetasol propionate 0.05% once daily at night for 4 weeks, then alternate nights for 4 weeks, and then twice weekly for 4 weeks  Qty: 45 g, Refills: 2    Associated Diagnoses: Vulvar irritation      fluticasone propionate (FLONASE) 50 mcg/actuation nasal spray 1 spray (50 mcg total) by Each Nostril route once daily.  Qty: 16 g, Refills: 3    Associated Diagnoses: Seasonal allergic rhinitis due to pollen      sulfacetamide sodium-sulfur 10-5 % (w/w) Clsr Use to wash face daily  Qty: 340 g, Refills: 11    Associated Diagnoses: Acne vulgaris      ustekinumab (STELARA) 45 mg/0.5 mL Syrg syringe AT THE START OF THERAPY, INJECT 45 MG UNDER THE SKIN ON WEEK 0 AND WEEK 4, THEN EVERY 12 WEEKS THEREAFTER  Qty: 0.5 mL, Refills: 3    Associated Diagnoses: Psoriatic arthritis; Psoriasis      ustekinumab (STELARA) 90 mg/mL Syrg syringe Inject 1 mL (90 mg total) into the skin every 12 weeks.  Qty: 1 mL, Refills: 4    Comments: Maintenance dosin mg SC every 12 weeks  Associated Diagnoses: Psoriatic arthritis; Scalp psoriasis             Discharge Procedure Orders (must include Diet, Follow-up, Activity)    Follow Up:  Follow up with PCP as previously scheduled  Resume routine diet.  Activity as tolerated.    No driving day of procedure.

## 2025-05-14 NOTE — H&P
History & Physical - Short Stay  Gastroenterology      SUBJECTIVE:     Procedure: Colonoscopy    Chief Complaint/Indication for Procedure: Abdominal Pain and Change in Bowel Habits    PTA Medications   Medication Sig    ascorbic acid, vitamin C, (VITAMIN C) 500 MG tablet Take 500 mg by mouth once daily.    aspirin 81 MG Chew Take 81 mg by mouth once daily.    cholecalciferol, vitamin D3, (VITAMIN D3) 50 mcg (2,000 unit) Cap capsule Take 2,000 Units by mouth.    famotidine (PEPCID) 40 MG tablet TAKE 1 TABLET DAILY    fexofenadine (ALLEGRA) 180 MG tablet Take 180 mg by mouth once daily.    folic acid (FOLVITE) 1 MG tablet Take 1 tablet (1 mg total) by mouth once daily.    methotrexate 2.5 MG Tab TAKE 4 TABLETS EVERY 7 DAYS    pantoprazole (PROTONIX) 40 MG tablet TAKE 1 TABLET DAILY    rosuvastatin (CRESTOR) 5 MG tablet TAKE 1 TABLET EVERY EVENING    tirzepatide 7.5 mg/0.5 mL PnIj Inject 7.5 mg into the skin every 7 days.    turmeric root extract 500 mg Cap Take 500 mg by mouth once daily at 6am.    vitamin B complex (SUPER B COMPLEX-B-12 ORAL)     blood sugar diagnostic (FREESTYLE LITE STRIPS) Strp TEST BLOOD SUGAR TWICE DAILY    cetirizine (ZYRTEC) 10 MG tablet Take 1 tablet (10 mg total) by mouth once daily.    clobetasol 0.05% (TEMOVATE) 0.05 % Oint Clobetasol propionate 0.05% once daily at night for 4 weeks, then alternate nights for 4 weeks, and then twice weekly for 4 weeks (Patient not taking: Reported on 4/11/2025)    fluticasone propionate (FLONASE) 50 mcg/actuation nasal spray 1 spray (50 mcg total) by Each Nostril route once daily.    sulfacetamide sodium-sulfur 10-5 % (w/w) Clsr Use to wash face daily    ustekinumab (STELARA) 45 mg/0.5 mL Syrg syringe AT THE START OF THERAPY, INJECT 45 MG UNDER THE SKIN ON WEEK 0 AND WEEK 4, THEN EVERY 12 WEEKS THEREAFTER    ustekinumab (STELARA) 90 mg/mL Syrg syringe Inject 1 mL (90 mg total) into the skin every 12 weeks.       Review of patient's allergies indicates:  "  Allergen Reactions    Court Other (See Comments)     Angioedema     Codeine      Nausea and Shaky    Fosamax [alendronate]      Stomach pain and arthralgias       Ozempic [semaglutide]      Stomach issues     Zoloft [sertraline]      Feeling a roller coaster in chest up to neck     Enbrel [etanercept] Rash    Lamisil [terbinafine hcl] Rash        Past Medical History:   Diagnosis Date    Allergic rhinitis     Diabetes mellitus, type 2     Diverticulosis     Fatty liver     GERD (gastroesophageal reflux disease)     History of colon polyps     Hyperlipidemia     Psoriatic arthritis      Past Surgical History:   Procedure Laterality Date    APPENDECTOMY      BREAST LUMPECTOMY      BREAST SURGERY      left, "benign tumor"    COLONOSCOPY N/A 08/10/2021    Procedure: COLONOSCOPY  Banding of hemorrhoids possible;  Surgeon: Faizan Mejia MD;  Location: [facility];  Service: Endoscopy;  Laterality: N/A;    FOOT SURGERY Bilateral     plantar fasciitis and neuroma by [place]    HYSTERECTOMY      OOPHORECTOMY       Family History   Problem Relation Name Age of Onset    Heart disease Mother      Diabetes Mother      Brain cancer Father          Glioblastoma     Psoriasis Father      Thyroid disease Sister          hypothyroidism     Diabetes Sister      Psoriasis Sister      No Known Problems Sister      Psoriasis Brother      Stroke Maternal Grandmother      Diabetes Maternal Grandmother      Glaucoma Maternal Grandmother      Diabetes Maternal Grandfather      Ovarian cancer Paternal Grandmother      Psoriasis Paternal Grandmother      Pancreatic cancer Paternal Grandfather      Cerebral palsy Daughter      Heart disease Daughter      Diabetes Daughter      No Known Problems Son      Macular degeneration Neg Hx      Eczema Neg Hx      Lupus Neg Hx      Melanoma Neg Hx      Colon cancer Neg Hx       Social History[1]      OBJECTIVE:     Vital Signs (Most Recent)  Temp: 97.5 °F (36.4 °C) (05/14/25 0712)  Pulse: 64 " (25)  Resp: 15 (25)  BP: 129/67 (25)  SpO2: 97 % (25)    Physical Exam:                                                       GENERAL:  Comfortable, in no acute distress.                                 HEENT EXAM:  Nonicteric.  No adenopathy.  Oropharynx is clear.               NECK:  Supple.                                                               LUNGS:  Clear.                                                               CARDIAC:  Regular rate and rhythm.  S1, S2.  No murmur.                      ABDOMEN:  Soft, positive bowel sounds, nontender.  No hepatosplenomegaly or masses.  No rebound or guarding.                                             EXTREMITIES:  No edema.     MENTAL STATUS:  Normal, alert and oriented.      ASSESSMENT/PLAN:     Assessment: Abdominal Pain and Change in Bowel Habits    Plan: Colonoscopy    Anesthesia Plan: General    ASA Grade: ASA 2 - Patient with mild systemic disease with no functional limitations    MALLAMPATI SCORE:  I (soft palate, uvula, fauces, and tonsillar pillars visible)           [1]   Social History  Tobacco Use    Smoking status: Former     Current packs/day: 0.00     Average packs/day: 1 pack/day for 40.0 years (40.0 ttl pk-yrs)     Types: Cigarettes     Start date: 1979     Quit date: 2019     Years since quittin.2    Smokeless tobacco: Never    Tobacco comments:     former smoker   Substance Use Topics    Alcohol use: Never    Drug use: Never

## 2025-05-14 NOTE — PROVATION PATIENT INSTRUCTIONS
Discharge Summary/Instructions after an Endoscopic Procedure  Patient Name: Charla Patrick  Patient MRN: [id number]  Patient YOB: 1960  Wednesday, May 14, 2025  Cristi Vee MD  Dear patient,  As a result of recent federal legislation (The Federal Cures Act), you may   receive lab or pathology results from your procedure in your [facility]   account before your physician is able to contact you. Your physician or   their representative will relay the results to you with their   recommendations at their soonest availability.  Thank you,  RESTRICTIONS:  During your procedure today, you received medications for sedation.  These   medications may affect your judgment, balance and coordination.  Therefore,   for 24 hours, you have the following restrictions:   - DO NOT drive a car, operate machinery, make legal/financial decisions,   sign important papers or drink alcohol.    ACTIVITY:  Today: no heavy lifting, straining or running due to procedural   sedation/anesthesia.  The following day: return to full activity including work.  DIET:  Eat and drink normally unless instructed otherwise.     TREATMENT FOR COMMON SIDE EFFECTS:  - Mild abdominal pain, nausea, belching, bloating or excessive gas:  rest,   eat lightly and use a heating pad.  - Sore Throat: treat with throat lozenges and/or gargle with warm salt   water.  - Because air was used during the procedure, expelling large amounts of air   from your rectum or belching is normal.  - If a bowel prep was taken, you may not have a bowel movement for 1-3 days.    This is normal.  SYMPTOMS TO WATCH FOR AND REPORT TO YOUR PHYSICIAN:  1. Abdominal pain or bloating, other than gas cramps.  2. Chest pain.  3. Back pain.  4. Signs of infection such as: chills or fever occurring within 24 hours   after the procedure.  5. Rectal bleeding, which would show as bright red, maroon, or black stools.   (A tablespoon of blood from the rectum is not serious, especially  if   hemorrhoids are present.)  6. Vomiting.  7. Weakness or dizziness.  GO DIRECTLY TO THE NEAREST EMERGENCY ROOM IF YOU HAVE ANY OF THE FOLLOWING:      Difficulty breathing              Chills and/or fever over 101 F   Persistent vomiting and/or vomiting blood   Severe abdominal pain   Severe chest pain   Black, tarry stools   Bleeding- more than one tablespoon   Any other symptom or condition that you feel may need urgent attention  Your doctor recommends these additional instructions:  If any biopsies were taken, your doctors clinic will contact you in 1 to 2   weeks with any results.  We are waiting for your pathology results.   Your physician has recommended a repeat colonoscopy in five years for   surveillance based on pathology results.   You are being discharged to home.  For questions, problems or results please call your physician - Cristi Vee MD at Work:  [phone].  EMERGENCY PHONE NUMBER: [phone], LAB RESULTS: [phone]  IF A COMPLICATION OR EMERGENCY SITUATION ARISES AND YOU ARE UNABLE TO REACH   YOUR PHYSICIAN - GO DIRECTLY TO THE EMERGENCY ROOM.  ___________________________________________  Nurse Signature  ___________________________________________  Patient/Designated Responsible Party Signature  Cristi Vee MD  5/14/2025 8:34:46 AM  This report has been verified and signed electronically.  Dear patient,  As a result of recent federal legislation (The Federal Cures Act), you may   receive lab or pathology results from your procedure in your [facility]   account before your physician is able to contact you. Your physician or   their representative will relay the results to you with their   recommendations at their soonest availability.  Thank you.  [employer]ATION

## 2025-05-15 VITALS
OXYGEN SATURATION: 98 % | SYSTOLIC BLOOD PRESSURE: 110 MMHG | HEIGHT: 65 IN | DIASTOLIC BLOOD PRESSURE: 58 MMHG | WEIGHT: 175 LBS | HEART RATE: 65 BPM | BODY MASS INDEX: 29.16 KG/M2 | TEMPERATURE: 98 F | RESPIRATION RATE: 16 BRPM

## 2025-05-16 LAB
ESTROGEN SERPL-MCNC: NORMAL PG/ML
INSULIN SERPL-ACNC: NORMAL U[IU]/ML
LAB AP CLINICAL INFORMATION: NORMAL
LAB AP GROSS DESCRIPTION: NORMAL
LAB AP PERFORMING LOCATION(S): NORMAL
LAB AP REPORT FOOTNOTES: NORMAL

## 2025-05-25 ENCOUNTER — RESULTS FOLLOW-UP (OUTPATIENT)
Dept: RHEUMATOLOGY | Facility: CLINIC | Age: 65
End: 2025-05-25

## 2025-06-02 ENCOUNTER — PATIENT MESSAGE (OUTPATIENT)
Dept: FAMILY MEDICINE | Facility: CLINIC | Age: 65
End: 2025-06-02
Payer: MEDICARE

## 2025-06-02 DIAGNOSIS — M81.0 AGE-RELATED OSTEOPOROSIS WITHOUT CURRENT PATHOLOGICAL FRACTURE: Primary | ICD-10-CM

## 2025-06-04 ENCOUNTER — PATIENT MESSAGE (OUTPATIENT)
Dept: FAMILY MEDICINE | Facility: CLINIC | Age: 65
End: 2025-06-04
Payer: MEDICARE

## 2025-06-04 ENCOUNTER — E-CONSULT (OUTPATIENT)
Dept: ENDOCRINOLOGY | Facility: CLINIC | Age: 65
End: 2025-06-04
Payer: MEDICARE

## 2025-06-04 DIAGNOSIS — M81.0 AGE-RELATED OSTEOPOROSIS WITHOUT CURRENT PATHOLOGICAL FRACTURE: Primary | ICD-10-CM

## 2025-06-04 DIAGNOSIS — E55.9 VITAMIN D DEFICIENCY: Primary | ICD-10-CM

## 2025-06-04 PROCEDURE — 99451 NTRPROF PH1/NTRNET/EHR 5/>: CPT | Mod: S$GLB,,, | Performed by: STUDENT IN AN ORGANIZED HEALTH CARE EDUCATION/TRAINING PROGRAM

## 2025-06-09 ENCOUNTER — LAB VISIT (OUTPATIENT)
Dept: LAB | Facility: HOSPITAL | Age: 65
End: 2025-06-09
Attending: INTERNAL MEDICINE
Payer: MEDICARE

## 2025-06-09 DIAGNOSIS — L40.50 PSORIATIC ARTHRITIS: ICD-10-CM

## 2025-06-09 LAB
ABSOLUTE EOSINOPHIL (OHS): 0.13 K/UL
ABSOLUTE MONOCYTE (OHS): 0.58 K/UL (ref 0.3–1)
ABSOLUTE NEUTROPHIL COUNT (OHS): 3.77 K/UL (ref 1.8–7.7)
ALBUMIN SERPL BCP-MCNC: 4.4 G/DL (ref 3.5–5.2)
ALP SERPL-CCNC: 84 UNIT/L (ref 40–150)
ALT SERPL W/O P-5'-P-CCNC: 35 UNIT/L (ref 10–44)
ANION GAP (OHS): 11 MMOL/L (ref 8–16)
AST SERPL-CCNC: 33 UNIT/L (ref 11–45)
BASOPHILS # BLD AUTO: 0.05 K/UL
BASOPHILS NFR BLD AUTO: 0.8 %
BILIRUB SERPL-MCNC: 0.4 MG/DL (ref 0.1–1)
BUN SERPL-MCNC: 15 MG/DL (ref 8–23)
CALCIUM SERPL-MCNC: 9.7 MG/DL (ref 8.7–10.5)
CHLORIDE SERPL-SCNC: 106 MMOL/L (ref 95–110)
CO2 SERPL-SCNC: 26 MMOL/L (ref 23–29)
CREAT SERPL-MCNC: 1 MG/DL (ref 0.5–1.4)
ERYTHROCYTE [DISTWIDTH] IN BLOOD BY AUTOMATED COUNT: 14.2 % (ref 11.5–14.5)
GFR SERPLBLD CREATININE-BSD FMLA CKD-EPI: >60 ML/MIN/1.73/M2
GLUCOSE SERPL-MCNC: 86 MG/DL (ref 70–110)
HCT VFR BLD AUTO: 42.7 % (ref 37–48.5)
HGB BLD-MCNC: 13.3 GM/DL (ref 12–16)
IMM GRANULOCYTES # BLD AUTO: 0.02 K/UL (ref 0–0.04)
IMM GRANULOCYTES NFR BLD AUTO: 0.3 % (ref 0–0.5)
LYMPHOCYTES # BLD AUTO: 1.71 K/UL (ref 1–4.8)
MCH RBC QN AUTO: 29 PG (ref 27–31)
MCHC RBC AUTO-ENTMCNC: 31.1 G/DL (ref 32–36)
MCV RBC AUTO: 93 FL (ref 82–98)
NUCLEATED RBC (/100WBC) (OHS): 0 /100 WBC
PLATELET # BLD AUTO: 219 K/UL (ref 150–450)
PMV BLD AUTO: 10.5 FL (ref 9.2–12.9)
POTASSIUM SERPL-SCNC: 4.6 MMOL/L (ref 3.5–5.1)
PROT SERPL-MCNC: 7.6 GM/DL (ref 6–8.4)
RBC # BLD AUTO: 4.59 M/UL (ref 4–5.4)
RELATIVE EOSINOPHIL (OHS): 2.1 %
RELATIVE LYMPHOCYTE (OHS): 27.3 % (ref 18–48)
RELATIVE MONOCYTE (OHS): 9.3 % (ref 4–15)
RELATIVE NEUTROPHIL (OHS): 60.2 % (ref 38–73)
SODIUM SERPL-SCNC: 143 MMOL/L (ref 136–145)
WBC # BLD AUTO: 6.26 K/UL (ref 3.9–12.7)

## 2025-06-09 PROCEDURE — 85025 COMPLETE CBC W/AUTO DIFF WBC: CPT

## 2025-06-09 PROCEDURE — 36415 COLL VENOUS BLD VENIPUNCTURE: CPT | Mod: PO

## 2025-06-09 PROCEDURE — 80053 COMPREHEN METABOLIC PANEL: CPT

## 2025-06-13 ENCOUNTER — PATIENT MESSAGE (OUTPATIENT)
Dept: RHEUMATOLOGY | Facility: CLINIC | Age: 65
End: 2025-06-13
Payer: MEDICARE

## 2025-06-13 ENCOUNTER — TELEPHONE (OUTPATIENT)
Dept: RHEUMATOLOGY | Facility: CLINIC | Age: 65
End: 2025-06-13
Payer: MEDICARE

## 2025-06-13 NOTE — TELEPHONE ENCOUNTER
Spoke to Ms Crow by phone and offered a sooner appointment but due to other obligations patient declined tjhose sooner appts.

## 2025-06-13 NOTE — TELEPHONE ENCOUNTER
Copied from CRM #[id number]. Topic: General Inquiry - Return Call  >> Jun 13, 2025 11:05 RAFA Aden wrote:  Type:  Patient Returning Call    Who Called:pt   Would the patient rather a call back or a response via [facility]? call  Best Call Back Number:[phone]   Additional Information: called about accepting appt on 9th or 10th

## 2025-06-17 ENCOUNTER — LAB VISIT (OUTPATIENT)
Dept: LAB | Facility: HOSPITAL | Age: 65
End: 2025-06-17
Attending: INTERNAL MEDICINE
Payer: MEDICARE

## 2025-06-17 DIAGNOSIS — E55.9 VITAMIN D DEFICIENCY: ICD-10-CM

## 2025-06-17 DIAGNOSIS — E78.2 MIXED HYPERLIPIDEMIA: ICD-10-CM

## 2025-06-17 DIAGNOSIS — L40.50 PSORIATIC ARTHRITIS: ICD-10-CM

## 2025-06-17 DIAGNOSIS — E11.59 TYPE 2 DIABETES MELLITUS WITH OTHER CIRCULATORY COMPLICATION, WITHOUT LONG-TERM CURRENT USE OF INSULIN: ICD-10-CM

## 2025-06-17 LAB
25(OH)D3+25(OH)D2 SERPL-MCNC: 148 NG/ML (ref 30–96)
ABSOLUTE EOSINOPHIL (OHS): 0.13 K/UL
ABSOLUTE MONOCYTE (OHS): 0.52 K/UL (ref 0.3–1)
ABSOLUTE NEUTROPHIL COUNT (OHS): 3.67 K/UL (ref 1.8–7.7)
BASOPHILS # BLD AUTO: 0.05 K/UL
BASOPHILS NFR BLD AUTO: 0.8 %
CHOLEST SERPL-MCNC: 144 MG/DL (ref 120–199)
CHOLEST/HDLC SERPL: 2.8 {RATIO} (ref 2–5)
EAG (OHS): 114 MG/DL (ref 68–131)
ERYTHROCYTE [DISTWIDTH] IN BLOOD BY AUTOMATED COUNT: 13.7 % (ref 11.5–14.5)
HBA1C MFR BLD: 5.6 % (ref 4–5.6)
HCT VFR BLD AUTO: 42.6 % (ref 37–48.5)
HDLC SERPL-MCNC: 51 MG/DL (ref 40–75)
HDLC SERPL: 35.4 % (ref 20–50)
HGB BLD-MCNC: 13.4 GM/DL (ref 12–16)
IMM GRANULOCYTES # BLD AUTO: 0.02 K/UL (ref 0–0.04)
IMM GRANULOCYTES NFR BLD AUTO: 0.3 % (ref 0–0.5)
LDLC SERPL CALC-MCNC: 68.2 MG/DL (ref 63–159)
LYMPHOCYTES # BLD AUTO: 1.5 K/UL (ref 1–4.8)
MCH RBC QN AUTO: 29.8 PG (ref 27–31)
MCHC RBC AUTO-ENTMCNC: 31.5 G/DL (ref 32–36)
MCV RBC AUTO: 95 FL (ref 82–98)
NONHDLC SERPL-MCNC: 93 MG/DL
NUCLEATED RBC (/100WBC) (OHS): 0 /100 WBC
PLATELET # BLD AUTO: 213 K/UL (ref 150–450)
PMV BLD AUTO: 10.8 FL (ref 9.2–12.9)
RBC # BLD AUTO: 4.49 M/UL (ref 4–5.4)
RELATIVE EOSINOPHIL (OHS): 2.2 %
RELATIVE LYMPHOCYTE (OHS): 25.5 % (ref 18–48)
RELATIVE MONOCYTE (OHS): 8.8 % (ref 4–15)
RELATIVE NEUTROPHIL (OHS): 62.4 % (ref 38–73)
TRIGL SERPL-MCNC: 124 MG/DL (ref 30–150)
WBC # BLD AUTO: 5.89 K/UL (ref 3.9–12.7)

## 2025-06-17 PROCEDURE — 82306 VITAMIN D 25 HYDROXY: CPT

## 2025-06-17 PROCEDURE — 83036 HEMOGLOBIN GLYCOSYLATED A1C: CPT

## 2025-06-17 PROCEDURE — 36415 COLL VENOUS BLD VENIPUNCTURE: CPT | Mod: PO

## 2025-06-17 PROCEDURE — 85025 COMPLETE CBC W/AUTO DIFF WBC: CPT

## 2025-06-17 PROCEDURE — 80061 LIPID PANEL: CPT

## 2025-06-18 ENCOUNTER — PATIENT MESSAGE (OUTPATIENT)
Dept: FAMILY MEDICINE | Facility: CLINIC | Age: 65
End: 2025-06-18
Payer: MEDICARE

## 2025-06-18 DIAGNOSIS — K76.0 FATTY LIVER: Primary | ICD-10-CM

## 2025-06-18 DIAGNOSIS — N18.30 TYPE 2 DIABETES MELLITUS WITH STAGE 3 CHRONIC KIDNEY DISEASE, WITHOUT LONG-TERM CURRENT USE OF INSULIN, UNSPECIFIED WHETHER STAGE 3A OR 3B CKD: ICD-10-CM

## 2025-06-18 DIAGNOSIS — E11.22 TYPE 2 DIABETES MELLITUS WITH STAGE 3 CHRONIC KIDNEY DISEASE, WITHOUT LONG-TERM CURRENT USE OF INSULIN, UNSPECIFIED WHETHER STAGE 3A OR 3B CKD: ICD-10-CM

## 2025-06-19 ENCOUNTER — PATIENT MESSAGE (OUTPATIENT)
Dept: FAMILY MEDICINE | Facility: CLINIC | Age: 65
End: 2025-06-19
Payer: MEDICARE

## 2025-06-19 NOTE — TELEPHONE ENCOUNTER
No care due was identified.  [facility] Embedded Care Due Messages. Reference number: [id number].   6/19/2025 6:08:39 AM CDT

## 2025-06-20 NOTE — TELEPHONE ENCOUNTER
No care due was identified.  [facility] Embedded Care Due Messages. Reference number: [id number].   6/20/2025 7:30:24 AM CDT

## 2025-06-23 ENCOUNTER — PATIENT MESSAGE (OUTPATIENT)
Dept: FAMILY MEDICINE | Facility: CLINIC | Age: 65
End: 2025-06-23
Payer: MEDICARE

## 2025-06-23 RX ORDER — ACETAMINOPHEN 500 MG
2000 TABLET ORAL DAILY
Qty: 90 CAPSULE | Refills: 3 | OUTPATIENT
Start: 2025-06-23

## 2025-06-24 DIAGNOSIS — L40.9 PSORIASIS: ICD-10-CM

## 2025-06-24 DIAGNOSIS — L40.9 SCALP PSORIASIS: ICD-10-CM

## 2025-06-24 DIAGNOSIS — L40.50 PSORIATIC ARTHRITIS: ICD-10-CM

## 2025-06-24 RX ORDER — USTEKINUMAB 45 MG/.5ML
INJECTION, SOLUTION SUBCUTANEOUS
Qty: 0.5 ML | Refills: 3 | Status: SHIPPED | OUTPATIENT
Start: 2025-06-24 | End: 2025-06-26

## 2025-06-24 RX ORDER — USTEKINUMAB 90 MG/ML
INJECTION, SOLUTION SUBCUTANEOUS
Qty: 1 ML | Refills: 4 | OUTPATIENT
Start: 2025-06-24

## 2025-06-26 ENCOUNTER — TELEPHONE (OUTPATIENT)
Dept: RHEUMATOLOGY | Facility: CLINIC | Age: 65
End: 2025-06-26
Payer: MEDICARE

## 2025-06-26 DIAGNOSIS — L40.9 SCALP PSORIASIS: ICD-10-CM

## 2025-06-26 DIAGNOSIS — L40.50 PSORIATIC ARTHRITIS: ICD-10-CM

## 2025-06-26 RX ORDER — USTEKINUMAB 90 MG/ML
90 INJECTION, SOLUTION SUBCUTANEOUS
Qty: 1 ML | Refills: 4 | Status: ACTIVE | OUTPATIENT
Start: 2025-06-26

## 2025-06-26 NOTE — TELEPHONE ENCOUNTER
----- Message from Yrn Poe sent at 6/25/2025  4:31 PM CDT -----  Regarding: Stelara Dosing  Good afternoon Dr. French,    [facility] received the Stelara prescription for Ms. Patrick. Per her prescription history, the patient has been on Stelara 90 mg every 12 weeks. The prescription we received is for the 45 mg with a loading dose noted. If appropriate, please send a prescription for the 90 mg maintenance dose.    Thank you,    Lui Toussaint, PharmD  Clinical Pharmacist  [facility]  [phone]

## 2025-07-01 ENCOUNTER — PATIENT MESSAGE (OUTPATIENT)
Dept: FAMILY MEDICINE | Facility: CLINIC | Age: 65
End: 2025-07-01
Payer: MEDICARE

## 2025-07-01 DIAGNOSIS — K76.0 FATTY LIVER: ICD-10-CM

## 2025-07-01 DIAGNOSIS — E11.22 TYPE 2 DIABETES MELLITUS WITH STAGE 3 CHRONIC KIDNEY DISEASE, WITHOUT LONG-TERM CURRENT USE OF INSULIN, UNSPECIFIED WHETHER STAGE 3A OR 3B CKD: ICD-10-CM

## 2025-07-01 DIAGNOSIS — N18.30 TYPE 2 DIABETES MELLITUS WITH STAGE 3 CHRONIC KIDNEY DISEASE, WITHOUT LONG-TERM CURRENT USE OF INSULIN, UNSPECIFIED WHETHER STAGE 3A OR 3B CKD: ICD-10-CM

## 2025-07-02 DIAGNOSIS — L40.50 PSORIATIC ARTHRITIS: ICD-10-CM

## 2025-07-02 DIAGNOSIS — L40.9 SCALP PSORIASIS: ICD-10-CM

## 2025-07-02 NOTE — TELEPHONE ENCOUNTER
Per [facility] they need an Stelara Rx to be sent to them as they now fill the specialty medication for this patient. The medication does not need a new auth. At this time.

## 2025-07-02 NOTE — TELEPHONE ENCOUNTER
Copied from CRM #[id number]. Topic: Medications - Pharmacy  >> Jul 2, 2025  2:27 PM Enoch wrote:  Type:  Pharmacy Calling to Clarify an RX    Name of Caller:Haja  Pharmacy Name:[facility]  Prescription Name:ustekinumab (STELARA) 90 mg/mL Syrg syringe   What do they need to clarify?: rx refill  Best Call Back Number:[phone]  Additional Information: Pharm adv they received a refill req then received a f/u msg that the rx was filled. Pls call back and adv. Thank you.

## 2025-07-03 ENCOUNTER — LAB VISIT (OUTPATIENT)
Dept: LAB | Facility: HOSPITAL | Age: 65
End: 2025-07-03
Attending: INTERNAL MEDICINE
Payer: MEDICARE

## 2025-07-03 DIAGNOSIS — Z79.899 IMMUNOSUPPRESSION DUE TO DRUG THERAPY: ICD-10-CM

## 2025-07-03 DIAGNOSIS — L40.50 PSORIATIC ARTHRITIS: ICD-10-CM

## 2025-07-03 DIAGNOSIS — Z79.899 HIGH RISK MEDICATIONS (NOT ANTICOAGULANTS) LONG-TERM USE: ICD-10-CM

## 2025-07-03 DIAGNOSIS — D84.821 IMMUNOSUPPRESSION DUE TO DRUG THERAPY: ICD-10-CM

## 2025-07-03 LAB
ABSOLUTE EOSINOPHIL (OHS): 0.12 K/UL
ABSOLUTE MONOCYTE (OHS): 0.6 K/UL (ref 0.3–1)
ABSOLUTE NEUTROPHIL COUNT (OHS): 3.39 K/UL (ref 1.8–7.7)
ALBUMIN SERPL BCP-MCNC: 4 G/DL (ref 3.5–5.2)
ALP SERPL-CCNC: 77 UNIT/L (ref 40–150)
ALT SERPL W/O P-5'-P-CCNC: 27 UNIT/L (ref 10–44)
ANION GAP (OHS): 7 MMOL/L (ref 8–16)
AST SERPL-CCNC: 22 UNIT/L (ref 11–45)
BASOPHILS # BLD AUTO: 0.06 K/UL
BASOPHILS NFR BLD AUTO: 1 %
BILIRUB SERPL-MCNC: 0.3 MG/DL (ref 0.1–1)
BUN SERPL-MCNC: 17 MG/DL (ref 8–23)
CALCIUM SERPL-MCNC: 9.1 MG/DL (ref 8.7–10.5)
CHLORIDE SERPL-SCNC: 107 MMOL/L (ref 95–110)
CO2 SERPL-SCNC: 25 MMOL/L (ref 23–29)
CREAT SERPL-MCNC: 0.9 MG/DL (ref 0.5–1.4)
CRP SERPL-MCNC: 4.9 MG/L
ERYTHROCYTE [DISTWIDTH] IN BLOOD BY AUTOMATED COUNT: 13.8 % (ref 11.5–14.5)
GFR SERPLBLD CREATININE-BSD FMLA CKD-EPI: >60 ML/MIN/1.73/M2
GLUCOSE SERPL-MCNC: 106 MG/DL (ref 70–110)
HCT VFR BLD AUTO: 38 % (ref 37–48.5)
HGB BLD-MCNC: 12.1 GM/DL (ref 12–16)
IMM GRANULOCYTES # BLD AUTO: 0.02 K/UL (ref 0–0.04)
IMM GRANULOCYTES NFR BLD AUTO: 0.3 % (ref 0–0.5)
LYMPHOCYTES # BLD AUTO: 1.65 K/UL (ref 1–4.8)
MCH RBC QN AUTO: 29.4 PG (ref 27–31)
MCHC RBC AUTO-ENTMCNC: 31.8 G/DL (ref 32–36)
MCV RBC AUTO: 92 FL (ref 82–98)
NUCLEATED RBC (/100WBC) (OHS): 0 /100 WBC
PLATELET # BLD AUTO: 213 K/UL (ref 150–450)
PMV BLD AUTO: 11 FL (ref 9.2–12.9)
POTASSIUM SERPL-SCNC: 3.9 MMOL/L (ref 3.5–5.1)
PROT SERPL-MCNC: 6.7 GM/DL (ref 6–8.4)
RBC # BLD AUTO: 4.12 M/UL (ref 4–5.4)
RELATIVE EOSINOPHIL (OHS): 2.1 %
RELATIVE LYMPHOCYTE (OHS): 28.3 % (ref 18–48)
RELATIVE MONOCYTE (OHS): 10.3 % (ref 4–15)
RELATIVE NEUTROPHIL (OHS): 58 % (ref 38–73)
SODIUM SERPL-SCNC: 139 MMOL/L (ref 136–145)
WBC # BLD AUTO: 5.84 K/UL (ref 3.9–12.7)

## 2025-07-03 PROCEDURE — 80053 COMPREHEN METABOLIC PANEL: CPT

## 2025-07-03 PROCEDURE — 85025 COMPLETE CBC W/AUTO DIFF WBC: CPT

## 2025-07-03 PROCEDURE — 86140 C-REACTIVE PROTEIN: CPT

## 2025-07-03 PROCEDURE — 36415 COLL VENOUS BLD VENIPUNCTURE: CPT | Mod: PO

## 2025-07-03 RX ORDER — USTEKINUMAB 90 MG/ML
90 INJECTION, SOLUTION SUBCUTANEOUS
Qty: 1 ML | Refills: 4 | Status: SHIPPED | OUTPATIENT
Start: 2025-07-03

## 2025-07-06 ENCOUNTER — RESULTS FOLLOW-UP (OUTPATIENT)
Dept: FAMILY MEDICINE | Facility: CLINIC | Age: 65
End: 2025-07-06

## 2025-07-09 ENCOUNTER — OFFICE VISIT (OUTPATIENT)
Dept: RHEUMATOLOGY | Facility: CLINIC | Age: 65
End: 2025-07-09
Payer: MEDICARE

## 2025-07-09 VITALS
HEIGHT: 65 IN | HEART RATE: 74 BPM | BODY MASS INDEX: 29.24 KG/M2 | WEIGHT: 175.5 LBS | DIASTOLIC BLOOD PRESSURE: 73 MMHG | SYSTOLIC BLOOD PRESSURE: 136 MMHG

## 2025-07-09 DIAGNOSIS — Z79.899 IMMUNOSUPPRESSION DUE TO DRUG THERAPY: ICD-10-CM

## 2025-07-09 DIAGNOSIS — J30.1 SEASONAL ALLERGIC RHINITIS DUE TO POLLEN: ICD-10-CM

## 2025-07-09 DIAGNOSIS — Z79.899 HIGH RISK MEDICATIONS (NOT ANTICOAGULANTS) LONG-TERM USE: ICD-10-CM

## 2025-07-09 DIAGNOSIS — D84.821 IMMUNOSUPPRESSION DUE TO DRUG THERAPY: ICD-10-CM

## 2025-07-09 DIAGNOSIS — L40.50 PSORIATIC ARTHRITIS: Primary | ICD-10-CM

## 2025-07-09 DIAGNOSIS — L40.9 PSORIASIS: ICD-10-CM

## 2025-07-09 DIAGNOSIS — E78.49 OTHER HYPERLIPIDEMIA: ICD-10-CM

## 2025-07-09 PROCEDURE — 99215 OFFICE O/P EST HI 40 MIN: CPT | Mod: 25,S$GLB,, | Performed by: INTERNAL MEDICINE

## 2025-07-09 PROCEDURE — 3044F HG A1C LEVEL LT 7.0%: CPT | Mod: CPTII,S$GLB,, | Performed by: INTERNAL MEDICINE

## 2025-07-09 PROCEDURE — 3008F BODY MASS INDEX DOCD: CPT | Mod: CPTII,S$GLB,, | Performed by: INTERNAL MEDICINE

## 2025-07-09 PROCEDURE — 99999 PR PBB SHADOW E&M-EST. PATIENT-LVL III: CPT | Mod: PBBFAC,,, | Performed by: INTERNAL MEDICINE

## 2025-07-09 PROCEDURE — 1101F PT FALLS ASSESS-DOCD LE1/YR: CPT | Mod: CPTII,S$GLB,, | Performed by: INTERNAL MEDICINE

## 2025-07-09 PROCEDURE — 3078F DIAST BP <80 MM HG: CPT | Mod: CPTII,S$GLB,, | Performed by: INTERNAL MEDICINE

## 2025-07-09 PROCEDURE — 3288F FALL RISK ASSESSMENT DOCD: CPT | Mod: CPTII,S$GLB,, | Performed by: INTERNAL MEDICINE

## 2025-07-09 PROCEDURE — 1125F AMNT PAIN NOTED PAIN PRSNT: CPT | Mod: CPTII,S$GLB,, | Performed by: INTERNAL MEDICINE

## 2025-07-09 PROCEDURE — 96372 THER/PROPH/DIAG INJ SC/IM: CPT | Mod: S$GLB,,, | Performed by: INTERNAL MEDICINE

## 2025-07-09 PROCEDURE — 1159F MED LIST DOCD IN RCRD: CPT | Mod: CPTII,S$GLB,, | Performed by: INTERNAL MEDICINE

## 2025-07-09 PROCEDURE — 3075F SYST BP GE 130 - 139MM HG: CPT | Mod: CPTII,S$GLB,, | Performed by: INTERNAL MEDICINE

## 2025-07-09 RX ORDER — METHYLPREDNISOLONE ACETATE 80 MG/ML
80 INJECTION, SUSPENSION INTRA-ARTICULAR; INTRALESIONAL; INTRAMUSCULAR; SOFT TISSUE
Status: COMPLETED | OUTPATIENT
Start: 2025-07-09 | End: 2025-07-09

## 2025-07-09 RX ADMIN — METHYLPREDNISOLONE ACETATE 80 MG: 80 INJECTION, SUSPENSION INTRA-ARTICULAR; INTRALESIONAL; INTRAMUSCULAR; SOFT TISSUE at 10:07

## 2025-07-09 ASSESSMENT — ROUTINE ASSESSMENT OF PATIENT INDEX DATA (RAPID3)
MDHAQ FUNCTION SCORE: 0.9
PSYCHOLOGICAL DISTRESS SCORE: 0
PATIENT GLOBAL ASSESSMENT SCORE: 5
TOTAL RAPID3 SCORE: 4.33
FATIGUE SCORE: 2.2
PAIN SCORE: 5

## 2025-07-09 NOTE — PROGRESS NOTES
Subjective:          Chief Complaint: Charla Patrick is a 65 y.o. female who had concerns including Disease Management.    HPI:  Patient is a 64-year-old female with psoriatic arthritis   Diagnosed approx 2006 - acute psoriasis.   Joints began shortly after the skin, joint pain was located in the fingers, hips, and both feet (chronic plantar fasciitis) .      Aggravating factors with the morning evening and during activity.    Alleviating factors were exercise and medications associated factors joint stiffness, fatigue, lack of sleep, joint pain, joint swelling, abdominal pain and night sweats.      She also notes dry eyes and dry mouth psoriasis family history of other autoimmune conditions.    Patient denies ulcerative colitis, crohns or other colitis. Only IBS to date.     7/2025:   Patient not doing well this visit. 5/10 pain various joints: neck, hips, left hand, right foot.   Rapid 3: 4.33 typically in 2s.   ESR and CRP WNL      9/2024: Rapid 3: 2.78  Right lateral hip is waking at night and painful. No groin pain. Minimal back pain. Known hx of SIJ issues and at times can radiate. We did inject right GTB in 2022 limited benefit. Never had SIJ injection  Left lateral epicondyle pulling a cooler, completed HEP/PT and doing well. More recent right lateral epicondyle flared few weeks ago. She does have associated     Current medication:  Stelara 90mg Q 12 w (start 9/2022)   MTX 4 tabs weekly  Folic acid 1 mg daily  tizandine 4mg tablets.   Tylenol PRN    Previous medications trialed:    Methotrexate began 2007.  Discontinued methotrexate 05/2017 no ASE or LAE.   Enbrel without adverse side effects no mention exact type.  Humira was loss of back efficacy over time,  Cimzia no benefit.   Otezla 30 mg once daily (5480-5301)- losing efficacy  Celebrex but developed a  gastritis despite this.  Cosentyx 300mg monthly (DC 9/2022) loss of efficacy    No personal hx of Cancer  No DVT/seizure or stroke, startin  estradiol     Patient also has osteopenia her last DEXA 2018 was osteopenia but her FRAX score was below recommended bisphosphonate or other therapeutic ranges so she was continued on regular weight-bearing calcium and vitamin-D and repeat DXA is every 2 years      10/23/2019 ultrasound bilateral hands  Left hand visualized portions of the bone muscle tendon joints and median nerve are normal appearing particular evaluation at the MCP 2 3 and 5 transverse views also obtained no evidence of any erosions power Doppler use for synovitis and tenosynovitis which was also negative.      REVIEW OF SYSTEMS:    Review of Systems   Constitutional:  Negative for fever, malaise/fatigue and weight loss.   HENT:  Negative for sore throat.    Eyes:  Negative for double vision, photophobia and redness.   Respiratory:  Negative for cough, shortness of breath and wheezing.    Cardiovascular:  Negative for chest pain, palpitations and orthopnea.   Gastrointestinal:  Negative for abdominal pain, constipation and diarrhea.   Genitourinary:  Negative for dysuria, hematuria and urgency.   Musculoskeletal:  Positive for joint pain. Negative for back pain and myalgias.   Skin:  Negative for rash.   Neurological:  Negative for dizziness, tingling, focal weakness and headaches.   Endo/Heme/Allergies:  Does not bruise/bleed easily.   Psychiatric/Behavioral:  Negative for depression, hallucinations and suicidal ideas.                Objective:            Past Medical History:   Diagnosis Date    Allergic rhinitis     Diabetes mellitus, type 2     Diverticulosis     Fatty liver     GERD (gastroesophageal reflux disease)     History of colon polyps     Hyperlipidemia     Psoriatic arthritis      Family History   Problem Relation Name Age of Onset    Heart disease Mother      Diabetes Mother      Brain cancer Father          Glioblastoma     Psoriasis Father      Thyroid disease Sister          hypothyroidism     Diabetes Sister      Psoriasis  Sister      No Known Problems Sister      Psoriasis Brother      Stroke Maternal Grandmother      Diabetes Maternal Grandmother      Glaucoma Maternal Grandmother      Diabetes Maternal Grandfather      Ovarian cancer Paternal Grandmother      Psoriasis Paternal Grandmother      Pancreatic cancer Paternal Grandfather      Cerebral palsy Daughter      Heart disease Daughter      Diabetes Daughter      No Known Problems Son      Macular degeneration Neg Hx      Eczema Neg Hx      Lupus Neg Hx      Melanoma Neg Hx      Colon cancer Neg Hx       Social History     Tobacco Use    Smoking status: Former     Current packs/day: 0.00     Average packs/day: 1 pack/day for 40.0 years (40.0 ttl pk-yrs)     Types: Cigarettes     Start date: 1979     Quit date: 2019     Years since quittin.4    Smokeless tobacco: Never    Tobacco comments:     former smoker   Substance Use Topics    Alcohol use: Never    Drug use: Never         Current Outpatient Medications on File Prior to Visit   Medication Sig Dispense Refill    ascorbic acid, vitamin C, (VITAMIN C) 500 MG tablet Take 500 mg by mouth once daily.      aspirin 81 MG Chew Take 81 mg by mouth once daily.      blood sugar diagnostic (FREESTYLE LITE STRIPS) Strp TEST BLOOD SUGAR TWICE DAILY 200 strip 3    cetirizine (ZYRTEC) 10 MG tablet Take 1 tablet (10 mg total) by mouth once daily. 90 tablet 3    famotidine (PEPCID) 40 MG tablet TAKE 1 TABLET DAILY 90 tablet 3    fexofenadine (ALLEGRA) 180 MG tablet Take 180 mg by mouth once daily.      fluticasone propionate (FLONASE) 50 mcg/actuation nasal spray 1 spray (50 mcg total) by Each Nostril route once daily. 16 g 3    folic acid (FOLVITE) 1 MG tablet Take 1 tablet (1 mg total) by mouth once daily. 100 tablet 3    methotrexate 2.5 MG Tab TAKE 4 TABLETS EVERY 7 DAYS 48 tablet 1    pantoprazole (PROTONIX) 40 MG tablet TAKE 1 TABLET DAILY 90 tablet 3    rosuvastatin (CRESTOR) 5 MG tablet TAKE 1 TABLET EVERY EVENING 90  tablet 3    sulfacetamide sodium-sulfur 10-5 % (w/w) Clsr Use to wash face daily 340 g 11    tirzepatide 10 mg/0.5 mL PnIj Inject 10 mg into the skin every 7 days. 6 mL 0    turmeric root extract 500 mg Cap Take 500 mg by mouth once daily at 6am.      ustekinumab (STELARA) 90 mg/mL Syrg syringe Inject 1 mL (90 mg total) into the skin every 12 weeks. 1 mL 4    vitamin B complex (SUPER B COMPLEX-B-12 ORAL)       cholecalciferol, vitamin D3, (VITAMIN D3) 50 mcg (2,000 unit) Cap capsule Take 2,000 Units by mouth. (Patient not taking: Reported on 7/9/2025)      clobetasol 0.05% (TEMOVATE) 0.05 % Oint Clobetasol propionate 0.05% once daily at night for 4 weeks, then alternate nights for 4 weeks, and then twice weekly for 4 weeks (Patient not taking: Reported on 7/9/2025) 45 g 2     No current facility-administered medications on file prior to visit.       Vitals:    07/09/25 0923   BP: 136/73   Pulse: 74         Physical Exam:    Physical Exam  Constitutional:       Appearance: She is well-developed.   Eyes:      General: Lids are normal.   Skin:     Comments: Flare psoriasis EAC bilaterally with prominent scale and erythema.    Neurological:      Mental Status: She is oriented to person, place, and time.   Psychiatric:         Behavior: Behavior normal.         Thought Content: Thought content normal.     There is currently no information documented on the homunculus. Go to the Rheumatology activity and complete the homunculus joint exam.        Assessment:       Encounter Diagnoses   Name Primary?    Psoriatic arthritis Yes    Psoriasis     Immunosuppression due to drug therapy     High risk medications (not anticoagulants) long-term use             Plan:        Psoriatic arthritis  -     methylPREDNISolone acetate injection 80 mg  -     AST (SGOT); Future; Expected date: 07/09/2025  -     ALT (SGPT); Future; Expected date: 07/09/2025  -     CBC Auto Differential; Future; Expected date: 07/09/2025  -     C-Reactive  Protein; Future; Expected date: 07/09/2025  -     Sedimentation rate; Future; Expected date: 07/09/2025  -     Creatinine, Serum; Future; Expected date: 07/09/2025    Psoriasis  -     methylPREDNISolone acetate injection 80 mg  -     AST (SGOT); Future; Expected date: 07/09/2025  -     ALT (SGPT); Future; Expected date: 07/09/2025  -     CBC Auto Differential; Future; Expected date: 07/09/2025  -     C-Reactive Protein; Future; Expected date: 07/09/2025  -     Sedimentation rate; Future; Expected date: 07/09/2025  -     Creatinine, Serum; Future; Expected date: 07/09/2025    Immunosuppression due to drug therapy  -     methylPREDNISolone acetate injection 80 mg  -     AST (SGOT); Future; Expected date: 07/09/2025  -     ALT (SGPT); Future; Expected date: 07/09/2025  -     CBC Auto Differential; Future; Expected date: 07/09/2025  -     C-Reactive Protein; Future; Expected date: 07/09/2025  -     Sedimentation rate; Future; Expected date: 07/09/2025  -     Creatinine, Serum; Future; Expected date: 07/09/2025    High risk medications (not anticoagulants) long-term use  -     methylPREDNISolone acetate injection 80 mg  -     AST (SGOT); Future; Expected date: 07/09/2025  -     ALT (SGPT); Future; Expected date: 07/09/2025  -     CBC Auto Differential; Future; Expected date: 07/09/2025  -     C-Reactive Protein; Future; Expected date: 07/09/2025  -     Sedimentation rate; Future; Expected date: 07/09/2025  -     Creatinine, Serum; Future; Expected date: 07/09/2025           Patient with long standing PsA:    -Started Stelara 9/2022 [facility]   [facility]     Enbrel without adverse side effects no mention exact type.  Humira was loss of back efficacy over time,  Cimzia no benefit.   Otezla 30 mg once daily (0633-8906)- losing efficacy  Celebrex but developed a  gastritis despite this.  Cosentyx 300mg monthly still with rising Rapid 3 and skin changes.     Continue Stelara- 9/2024 doing well  overall she is having a great deal of pain this visit but suspect flare. Trial with medrol 80mg and continue with Stelara for now. If she continues to decline we will need to discuss a biologic change.   Continue Methotrexate. She is to take 4 tablets ONE TIME WEEKLY. Folic acid is one tablet daily  Labs in 3 months.     Regarding chronic right GTB if persists I will have Dr. Lofton consider US guided injection vs SIJ inject and did discuss Tenex. Right hip is not that painful right now will continue to just monitor.      No follow-ups on file.

## 2025-07-09 NOTE — TELEPHONE ENCOUNTER
No care due was identified.  [facility] Embedded Care Due Messages. Reference number: [id number].   7/09/2025 6:48:15 PM CDT

## 2025-07-09 NOTE — LETTER
I certify that (Name) Charla Patrick meets the requirements as outlined in # 1,6 (shown on reverse side) and qualifies for a mobility impaired license plate/hang-tag. I further understand that willful and false certification shall subject me to fines/imprisonment as outlined in R.S. 47:463.4 (G) (4). The applicant's information is as follows:    YOB: 1960            Race:White        Gender:Female    Address:  [address] [place] [address]    City:[place]                               State:[place]     Zip Code:[address]     []Permanently Impaired - Applicant has a total or lifelong condition of mobility impairment from which little or no improvement or recovery can reasonably be expected. A medical examiners certification is required on initial application only.      [x] Temporarily Impaired - Applicant has a temporary condition of mobility impairment of which improvement or recovery can reasonably be expected. Applicant is entitled to a hangtag, which will be valid for one (1) year. A medical examiners certification is required for the renewal of the hangtag      [] Unable to appear in person at the Office of Motor Vehicles - Applicant must bring facial photo        Medical Examiner's Signature________________________________________ Date:7/9/25_________________________    Printed Name:__Winnie French DO_________________________________________________    State License #_____________________________    Address: [address]___                                     Phone Number: [phone]                                                                                                                                                                                                                  City: [place]__ State: [place] __Zip Code: [address] _______________    TO BE COMPLETED BY MOTOR VEHICLE ANALYST ONLY    [place]   Lic. Plate #      Hangtag Control #   Hangtag ID  #      Date Issued:    #:   Office #:

## 2025-07-09 NOTE — PROGRESS NOTES
2 pt ID used, allergies reviewed, see MAR for med,dose, and injection site. Patient tolerated well. TC LPN

## 2025-07-10 ENCOUNTER — PATIENT MESSAGE (OUTPATIENT)
Dept: RHEUMATOLOGY | Facility: CLINIC | Age: 65
End: 2025-07-10
Payer: MEDICARE

## 2025-07-10 RX ORDER — FLUTICASONE PROPIONATE 50 MCG
1 SPRAY, SUSPENSION (ML) NASAL
Qty: 48 G | Refills: 2 | Status: SHIPPED | OUTPATIENT
Start: 2025-07-10

## 2025-07-10 RX ORDER — ROSUVASTATIN CALCIUM 5 MG/1
5 TABLET, COATED ORAL NIGHTLY
Qty: 90 TABLET | Refills: 2 | Status: SHIPPED | OUTPATIENT
Start: 2025-07-10

## 2025-07-10 NOTE — TELEPHONE ENCOUNTER
Refill Decision Note   Charla Celina  is requesting a refill authorization.  Brief Assessment and Rationale for Refill:  Approve     Medication Therapy Plan:        Comments:     Note composed:3:14 AM 07/10/2025

## 2025-07-13 ENCOUNTER — PATIENT MESSAGE (OUTPATIENT)
Dept: RHEUMATOLOGY | Facility: CLINIC | Age: 65
End: 2025-07-13
Payer: MEDICARE

## 2025-07-18 DIAGNOSIS — Z01.811 PRE-OPERATIVE RESPIRATORY EXAMINATION: ICD-10-CM

## 2025-07-18 DIAGNOSIS — K76.89 LIVER CYST: Primary | ICD-10-CM

## 2025-08-04 ENCOUNTER — HOSPITAL ENCOUNTER (OUTPATIENT)
Dept: RADIOLOGY | Facility: HOSPITAL | Age: 65
Discharge: HOME | End: 2025-08-04
Attending: TRANSPLANT SURGERY
Payer: MEDICARE

## 2025-08-04 DIAGNOSIS — Z01.811 PRE-OPERATIVE RESPIRATORY EXAMINATION: ICD-10-CM

## 2025-08-04 DIAGNOSIS — K76.89 LIVER CYST: ICD-10-CM

## 2025-08-04 PROCEDURE — 71046 X-RAY EXAM CHEST 2 VIEWS: CPT | Mod: 26,,, | Performed by: STUDENT IN AN ORGANIZED HEALTH CARE EDUCATION/TRAINING PROGRAM

## 2025-08-04 PROCEDURE — 71046 X-RAY EXAM CHEST 2 VIEWS: CPT | Mod: TC,PO
